# Patient Record
Sex: FEMALE | Race: BLACK OR AFRICAN AMERICAN | NOT HISPANIC OR LATINO | Employment: FULL TIME | URBAN - METROPOLITAN AREA
[De-identification: names, ages, dates, MRNs, and addresses within clinical notes are randomized per-mention and may not be internally consistent; named-entity substitution may affect disease eponyms.]

---

## 2017-01-04 ENCOUNTER — TELEPHONE (OUTPATIENT)
Dept: FAMILY MEDICINE | Facility: CLINIC | Age: 47
End: 2017-01-04

## 2017-01-04 NOTE — TELEPHONE ENCOUNTER
The physical she had on 11/21 was not dated.  The date is missing next to providers signature.  Please update in MyChart.

## 2017-02-16 ENCOUNTER — ALLIED HEALTH/NURSE VISIT (OUTPATIENT)
Dept: NURSING | Facility: CLINIC | Age: 47
End: 2017-02-16
Payer: COMMERCIAL

## 2017-02-16 DIAGNOSIS — Z23 NEED FOR HEPATITIS B VACCINATION: Primary | ICD-10-CM

## 2017-02-16 PROCEDURE — 90746 HEPB VACCINE 3 DOSE ADULT IM: CPT

## 2017-02-16 PROCEDURE — 99207 ZZC NO CHARGE NURSE ONLY: CPT

## 2017-02-16 PROCEDURE — 90471 IMMUNIZATION ADMIN: CPT

## 2017-02-16 NOTE — MR AVS SNAPSHOT
After Visit Summary   2/16/2017    Arcelia Liz    MRN: [id number]           Patient Information     Date Of Birth          1970        Visit Information        Provider Department      2/16/2017 1:30 PM [facility]        Today's Diagnoses     Need for hepatitis B vaccination    -  1       Follow-ups after your visit        Who to contact     If you have questions or need follow up information about today's clinic visit or your schedule please contact [facility] directly at [phone].  Normal or non-critical lab and imaging results will be communicated to you by Vidithart, letter or phone within 4 business days after the clinic has received the results. If you do not hear from us within 7 days, please contact the clinic through iGot or phone. If you have a critical or abnormal lab result, we will notify you by phone as soon as possible.  Submit refill requests through [employer] or call your pharmacy and they will forward the refill request to us. Please allow 3 business days for your refill to be completed.          Additional Information About Your Visit        MyChart Information     [employer] gives you secure access to your electronic health record. If you see a primary care provider, you can also send messages to your care team and make appointments. If you have questions, please call your primary care clinic.  If you do not have a primary care provider, please call [phone] and they will assist you.        Care EveryWhere ID     This is your Care EveryWhere ID. This could be used by other organizations to access your [facility] medical records  [id number]         Blood Pressure from Last 3 Encounters:   11/21/16 110/74   07/14/16 107/71   10/30/15 115/71    Weight from Last 3 Encounters:   11/21/16 168 lb 12.8 oz (76.6 kg)   07/14/16 176 lb (79.8 kg)   10/30/15 178 lb (80.7 kg)              We Performed the Following     HEPATITIS B VACCINE,ADULT,IM      VACCINE ADMINISTRATION, INITIAL        Primary Care Provider Office Phone # Fax #    Maria Baldwin PA-C [phone]       [facility] [address]  [place] [address]        Thank you!     Thank you for choosing [facility]  for your care. Our goal is always to provide you with excellent care. Hearing back from our patients is one way we can continue to improve our services. Please take a few minutes to complete the written survey that you may receive in the mail after your visit with us. Thank you!             Your Updated Medication List - Protect others around you: Learn how to safely use, store and throw away your medicines at www.disposemymeds.org.          This list is accurate as of: 2/16/17  3:49 PM.  Always use your most recent med list.                   Brand Name Dispense Instructions for use    cholecalciferol 400 UNIT Tabs tablet    vitamin D    60 each    Take 1 tablet (400 Units) by mouth daily Take 2 tablets (800 Units) daily after completing 8 weeks of the Vitamin 50,000 Unit tablets.       ferrous sulfate 325 (65 FE) MG tablet    IRON    90 tablet    Take 1 tablet (325 mg) by mouth 2 times daily       naproxen 500 MG tablet    NAPROSYN    30 tablet    Take 1 tablet (500 mg) by mouth 2 times daily as needed for moderate pain       order for DME     1 each    Equipment being ordered: Right Knee brace

## 2017-02-16 NOTE — NURSING NOTE
Screening Questionnaire for Adult Immunization    Are you sick today?   No   Do you have allergies to medications, food, a vaccine component or latex?   No   Have you ever had a serious reaction after receiving a vaccination?   No   Do you have a long-term health problem with heart disease, lung disease, asthma, kidney disease, metabolic disease (e.g. diabetes), anemia, or other blood disorder?   No   Do you have cancer, leukemia, HIV/AIDS, or any other immune system problem?   No   In the past 3 months, have you taken medications that affect  your immune system, such as prednisone, other steroids, or anticancer drugs; drugs for the treatment of rheumatoid arthritis, Crohn s disease, or psoriasis; or have you had radiation treatments?   No   Have you had a seizure, or a brain or other nervous system problem?   No   During the past year, have you received a transfusion of blood or blood     products, or been given immune (gamma) globulin or antiviral drug?   No   For women: Are you pregnant or is there a chance you could become        pregnant during the next month?   No   Have you received any vaccinations in the past 4 weeks?   No     Immunization questionnaire answers were all negative.      MNVFC doesn't apply on this patient    Per orders of Maria Baldwin, injection of Hep B given by Francheska Landeros. Patient instructed to remain in clinic for 20 minutes afterwards, and to report any adverse reaction to me immediately.       Screening performed by Francheska Landeros on 2/16/2017 at 3:48 PM.

## 2017-03-23 ENCOUNTER — TELEPHONE (OUTPATIENT)
Dept: FAMILY MEDICINE | Facility: CLINIC | Age: 47
End: 2017-03-23

## 2017-03-23 NOTE — TELEPHONE ENCOUNTER
Patient states that her physical form needs to be both signed and dated by Lily Galindo. She is requesting this be done and she be contacted when completed.

## 2017-04-03 ENCOUNTER — RADIANT APPOINTMENT (OUTPATIENT)
Dept: ULTRASOUND IMAGING | Facility: CLINIC | Age: 47
End: 2017-04-03
Attending: NURSE PRACTITIONER
Payer: MEDICAID

## 2017-04-03 ENCOUNTER — OFFICE VISIT (OUTPATIENT)
Dept: FAMILY MEDICINE | Facility: CLINIC | Age: 47
End: 2017-04-03
Payer: MEDICAID

## 2017-04-03 DIAGNOSIS — R10.11 RUQ ABDOMINAL PAIN: ICD-10-CM

## 2017-04-03 DIAGNOSIS — K21.9 GASTROESOPHAGEAL REFLUX DISEASE WITHOUT ESOPHAGITIS: ICD-10-CM

## 2017-04-03 DIAGNOSIS — R10.84 ABDOMINAL PAIN, GENERALIZED: Primary | ICD-10-CM

## 2017-04-03 DIAGNOSIS — R10.84 ABDOMINAL PAIN, GENERALIZED: ICD-10-CM

## 2017-04-03 DIAGNOSIS — K59.00 CONSTIPATION, UNSPECIFIED CONSTIPATION TYPE: ICD-10-CM

## 2017-04-03 LAB
ALBUMIN SERPL-MCNC: 4 G/DL (ref 3.4–5)
ALP SERPL-CCNC: 77 U/L (ref 40–150)
ALT SERPL W P-5'-P-CCNC: 18 U/L (ref 0–50)
AST SERPL W P-5'-P-CCNC: 12 U/L (ref 0–45)
BASOPHILS # BLD AUTO: 0 10E9/L (ref 0–0.2)
BASOPHILS NFR BLD AUTO: 0.8 %
BILIRUB DIRECT SERPL-MCNC: <0.1 MG/DL (ref 0–0.2)
BILIRUB SERPL-MCNC: 0.2 MG/DL (ref 0.2–1.3)
DIFFERENTIAL METHOD BLD: ABNORMAL
EOSINOPHIL # BLD AUTO: 0 10E9/L (ref 0–0.7)
EOSINOPHIL NFR BLD AUTO: 0.5 %
ERYTHROCYTE [DISTWIDTH] IN BLOOD BY AUTOMATED COUNT: 14.9 % (ref 10–15)
HCT VFR BLD AUTO: 37.6 % (ref 35–47)
HGB BLD-MCNC: 12 G/DL (ref 11.7–15.7)
LYMPHOCYTES # BLD AUTO: 1.3 10E9/L (ref 0.8–5.3)
LYMPHOCYTES NFR BLD AUTO: 33.7 %
MCH RBC QN AUTO: 26.1 PG (ref 26.5–33)
MCHC RBC AUTO-ENTMCNC: 31.9 G/DL (ref 31.5–36.5)
MCV RBC AUTO: 82 FL (ref 78–100)
MONOCYTES # BLD AUTO: 0.3 10E9/L (ref 0–1.3)
MONOCYTES NFR BLD AUTO: 7.6 %
NEUTROPHILS # BLD AUTO: 2.2 10E9/L (ref 1.6–8.3)
NEUTROPHILS NFR BLD AUTO: 57.4 %
PLATELET # BLD AUTO: 297 10E9/L (ref 150–450)
PROT SERPL-MCNC: 7.4 G/DL (ref 6.8–8.8)
RBC # BLD AUTO: 4.6 10E12/L (ref 3.8–5.2)
WBC # BLD AUTO: 3.8 10E9/L (ref 4–11)

## 2017-04-03 PROCEDURE — 99214 OFFICE O/P EST MOD 30 MIN: CPT | Performed by: NURSE PRACTITIONER

## 2017-04-03 PROCEDURE — 36415 COLL VENOUS BLD VENIPUNCTURE: CPT | Performed by: NURSE PRACTITIONER

## 2017-04-03 PROCEDURE — 76700 US EXAM ABDOM COMPLETE: CPT

## 2017-04-03 PROCEDURE — 80076 HEPATIC FUNCTION PANEL: CPT | Performed by: NURSE PRACTITIONER

## 2017-04-03 PROCEDURE — 85025 COMPLETE CBC W/AUTO DIFF WBC: CPT | Performed by: NURSE PRACTITIONER

## 2017-04-03 RX ORDER — POLYETHYLENE GLYCOL 3350 17 G/17G
1 POWDER, FOR SOLUTION ORAL DAILY
Qty: 510 G | Refills: 1 | Status: SHIPPED | OUTPATIENT
Start: 2017-04-03 | End: 2017-10-06

## 2017-04-03 NOTE — PATIENT INSTRUCTIONS
If you are having constipation, I want you to use a capful of mirilax daily- mix with any liquid. Increase your fluids to 8-12 glasses of water per day.  If you have reflux symptom, take omeprazole daily. You can take this daily to prevent acid reflux/ heart burn. Go to ultrasound. Follow up if your symptoms persist or worsen.  We will let you know your lab results. I hope your husbands tests turn out well. Let me know if you have any questions or concerns.   Abdominal Pain  Abdominal pain is pain in the stomach or intestinal area. Everyone has this pain from time to time. In many cases it goes away on its own. But abdominal pain can sometimes be due to a serious problem, such as appendicitis. So it s important to know when to seek help.  Causes of abdominal pain  There are many possible causes of abdominal pain. Common causes in adults include:    Constipation, diarrhea, or gas    GERD (gastroesophageal reflux disease) movement of stomach acid into the esophagus, also known as acid reflux or heartburn    Peptic ulcer (a sore in the lining of the stomach or small intestine)    Inflammation of the gallbladder, liver, or pancreas    Gallstones or kidney stones    Appendicitis     Obstruction of the intestines     Hernia (bulging of an internal organ through a muscle or other tissue)    Urinary tract infections    In women, menstrual cramps, fibroids, or endometriosis of the uterus    Inflammation or infection of the intestines  Diagnosing the cause of abdominal pain  Your health care provider will examine you to help find the cause of your pain. If needed, tests will be ordered. Because abdominal pain has so many possible causes, it can be hard to discover the reason for the pain. Giving details about your pain can help. Be ready to tell your health care provider where and when you feel the pain and what makes it better or worse. Also mention whether you have other symptoms such as fever, tiredness, nausea, vomiting,  or changes in bathroom habits.  Treating abdominal pain  Certain causes of pain, such as appendicitis or a bowel obstruction, need emergency treatment. Other problems can be treated with rest, fluids, or medications. Your health care provider can give you specific instructions for treatment or self-care based on the cause of your pain.  If you have vomiting or diarrhea, sip water or other clear fluids. When you are ready to eat solid foods again, start with small amounts of easy-to-digest, low-fat foods, such as applesauce, toast, or crackers.   When to call the doctor  Call 911 or go to the hospital right away if you:    Can t pass stool and are vomiting    Are vomiting blood or have black, tarry diarrhea    Also have chest, neck, or shoulder pain    Feel like you are about to pass out    Have pain in your shoulder blades with nausea    Have sudden, excruciating abdominal pain    Have new, severe pain unlike any you have felt before    Have a belly that is rigid, hard, and tender to touch  Call your doctor if you have:    Pain for more than 5 days    Bloating for more than 2 days    Diarrhea for more than 5 days    Fever of 101 F (38.3 C) or higher    Pain that continues to worsen    Unexplained weight loss    Continued lack of appetite    Blood in the stool  How to prevent abdominal pain  Here are some tips to help prevent abdominal pain:    Eat smaller amounts of food at one time.    Avoid greasy, fried, or other high-fat foods.    Avoid foods that give you gas.    Exercise regularly.    Drink plenty of fluids.  To help prevent symptoms of gastroesophageal reflux disease (GERD):    Quit smoking.    Reduce alcohol and certain foods that increase stomach acid.     Lose excess weight.    Finish eating at least 2 hours before you go to bed or lie down.    Elevate the head of your bed.    1579-0015 [employer]. [address] [place] [address], [place] [address]. All rights reserved. This information is not  intended as a substitute for professional medical care. Always follow your healthcare professional's instructions.        Tips to Control Acid Reflux  To control acid reflux, you ll need to make some basic diet and lifestyle changes. The simple steps outlined below may be all you ll need to relieve discomfort.  Watch What You Eat      Avoid fatty foods and spicy foods.    Eat fewer acidic foods, such as citrus and tomato-based foods. These can increase symptoms.    Limit drinking alcohol, caffeine, and fizzy beverages. All increase acid reflux.    Try limiting chocolate, peppermint, and spearmint. These can worsen acid reflux in some people.  Watch When You Eat    Avoid lying down for 3 hours after eating.    Do not snack before going to bed.  Raise Your Head    Raising your head and upper body by 4 inches to 6 inches helps limit reflux when you re lying down. Put blocks under the head of the bed frame to raise it.  Other Changes    Lose weight, if you need to.    Don t work out near bedtime.    Avoid tight-fitting clothes.    Limit aspirin and ibuprofen.    Stop smoking.     8445-2124 [employer]. [address] [place] [address]. All rights reserved. This information is not intended as a substitute for professional medical care. Always follow your healthcare professional's instructions.        Lifestyle Changes for Controlling GERD  When you have GERD, stomach acid feels as if it s backing up toward your mouth. Whether or not you take medication to control your GERD, your symptoms can often be improved with lifestyle changes. Talk to your doctor about the following suggestions, which may help you get relief from your symptoms.  Raise Your Head    Reflux is more likely to strike when you re lying down flat, because stomach fluid can flow backward more easily. Raising the head of your bed 4 to 6 inches can help. To do this:    Slide blocks or books under the legs at the head of your bed. Or,  place a wedge under the mattress. Many foam stores can make a suitable wedge for you. The wedge should run from your waist to the top of your head.    Don t just prop your head on several pillows. This increases pressure on your stomach. It can make GERD worse.  Watch Your Eating Habits  Certain foods may increase the acid in your stomach or relax the lower esophageal sphincter, making GERD more likely. It s best to avoid the following:    Coffee, tea, and carbonated drinks (with and without caffeine)    Fatty, fried, or spicy food    Mint, chocolate, onions, and tomatoes    Any other foods that seem to irritate your stomach or cause you pain  Relieve the Pressure    Eat smaller meals, even if you have to eat more often.    Don t lie down right after you eat. Wait a few hours for your stomach to empty.    Avoid tight belts and tight-fitting clothes.    Lose excess weight.  Tobacco and Alcohol  Avoid smoking tobacco and drinking alcohol. They can make GERD symptoms worse.    8642-0651 [employer]. [address] [place] [address], [place] [address]. All rights reserved. This information is not intended as a substitute for professional medical care. Always follow your healthcare professional's instructions.

## 2017-04-03 NOTE — MR AVS SNAPSHOT
After Visit Summary   4/3/2017    Arcelia Liz    MRN: [id number]           Patient Information     Date Of Birth          1970        Visit Information        Provider Department      4/3/2017 10:00 AM Lily Galindo NP [facility]        Today's Diagnoses     Abdominal pain, generalized    -  1    RUQ abdominal pain        Constipation, unspecified constipation type        Gastroesophageal reflux disease without esophagitis          Care Instructions    If you are having constipation, I want you to use a capful of mirilax daily- mix with any liquid. Increase your fluids to 8-12 glasses of water per day.  If you have reflux symptom, take omeprazole daily. You can take this daily to prevent acid reflux/ heart burn. Go to ultrasound. Follow up if your symptoms persist or worsen.  We will let you know your lab results. I hope your husbands tests turn out well. Let me know if you have any questions or concerns.   Abdominal Pain  Abdominal pain is pain in the stomach or intestinal area. Everyone has this pain from time to time. In many cases it goes away on its own. But abdominal pain can sometimes be due to a serious problem, such as appendicitis. So it s important to know when to seek help.  Causes of abdominal pain  There are many possible causes of abdominal pain. Common causes in adults include:    Constipation, diarrhea, or gas    GERD (gastroesophageal reflux disease) movement of stomach acid into the esophagus, also known as acid reflux or heartburn    Peptic ulcer (a sore in the lining of the stomach or small intestine)    Inflammation of the gallbladder, liver, or pancreas    Gallstones or kidney stones    Appendicitis     Obstruction of the intestines     Hernia (bulging of an internal organ through a muscle or other tissue)    Urinary tract infections    In women, menstrual cramps, fibroids, or endometriosis of the uterus    Inflammation or infection of the  intestines  Diagnosing the cause of abdominal pain  Your health care provider will examine you to help find the cause of your pain. If needed, tests will be ordered. Because abdominal pain has so many possible causes, it can be hard to discover the reason for the pain. Giving details about your pain can help. Be ready to tell your health care provider where and when you feel the pain and what makes it better or worse. Also mention whether you have other symptoms such as fever, tiredness, nausea, vomiting, or changes in bathroom habits.  Treating abdominal pain  Certain causes of pain, such as appendicitis or a bowel obstruction, need emergency treatment. Other problems can be treated with rest, fluids, or medications. Your health care provider can give you specific instructions for treatment or self-care based on the cause of your pain.  If you have vomiting or diarrhea, sip water or other clear fluids. When you are ready to eat solid foods again, start with small amounts of easy-to-digest, low-fat foods, such as applesauce, toast, or crackers.   When to call the doctor  Call 911 or go to the hospital right away if you:    Can t pass stool and are vomiting    Are vomiting blood or have black, tarry diarrhea    Also have chest, neck, or shoulder pain    Feel like you are about to pass out    Have pain in your shoulder blades with nausea    Have sudden, excruciating abdominal pain    Have new, severe pain unlike any you have felt before    Have a belly that is rigid, hard, and tender to touch  Call your doctor if you have:    Pain for more than 5 days    Bloating for more than 2 days    Diarrhea for more than 5 days    Fever of 101 F (38.3 C) or higher    Pain that continues to worsen    Unexplained weight loss    Continued lack of appetite    Blood in the stool  How to prevent abdominal pain  Here are some tips to help prevent abdominal pain:    Eat smaller amounts of food at one time.    Avoid greasy, fried, or  other high-fat foods.    Avoid foods that give you gas.    Exercise regularly.    Drink plenty of fluids.  To help prevent symptoms of gastroesophageal reflux disease (GERD):    Quit smoking.    Reduce alcohol and certain foods that increase stomach acid.     Lose excess weight.    Finish eating at least 2 hours before you go to bed or lie down.    Elevate the head of your bed.    6971-9542 [employer]. [address] [place] [address]. All rights reserved. This information is not intended as a substitute for professional medical care. Always follow your healthcare professional's instructions.        Tips to Control Acid Reflux  To control acid reflux, you ll need to make some basic diet and lifestyle changes. The simple steps outlined below may be all you ll need to relieve discomfort.  Watch What You Eat      Avoid fatty foods and spicy foods.    Eat fewer acidic foods, such as citrus and tomato-based foods. These can increase symptoms.    Limit drinking alcohol, caffeine, and fizzy beverages. All increase acid reflux.    Try limiting chocolate, peppermint, and spearmint. These can worsen acid reflux in some people.  Watch When You Eat    Avoid lying down for 3 hours after eating.    Do not snack before going to bed.  Raise Your Head    Raising your head and upper body by 4 inches to 6 inches helps limit reflux when you re lying down. Put blocks under the head of the bed frame to raise it.  Other Changes    Lose weight, if you need to.    Don t work out near bedtime.    Avoid tight-fitting clothes.    Limit aspirin and ibuprofen.    Stop smoking.     5263-0971 [employer]. [address] [place] [address]32. All rights reserved. This information is not intended as a substitute for professional medical care. Always follow your healthcare professional's instructions.        Lifestyle Changes for Controlling GERD  When you have GERD, stomach acid feels as if it s backing up  toward your mouth. Whether or not you take medication to control your GERD, your symptoms can often be improved with lifestyle changes. Talk to your doctor about the following suggestions, which may help you get relief from your symptoms.  Raise Your Head    Reflux is more likely to strike when you re lying down flat, because stomach fluid can flow backward more easily. Raising the head of your bed 4 to 6 inches can help. To do this:    Slide blocks or books under the legs at the head of your bed. Or, place a wedge under the mattress. Many [employer] can make a suitable wedge for you. The wedge should run from your waist to the top of your head.    Don t just prop your head on several pillows. This increases pressure on your stomach. It can make GERD worse.  Watch Your Eating Habits  Certain foods may increase the acid in your stomach or relax the lower esophageal sphincter, making GERD more likely. It s best to avoid the following:    Coffee, tea, and carbonated drinks (with and without caffeine)    Fatty, fried, or spicy food    Mint, chocolate, onions, and tomatoes    Any other foods that seem to irritate your stomach or cause you pain  Relieve the Pressure    Eat smaller meals, even if you have to eat more often.    Don t lie down right after you eat. Wait a few hours for your stomach to empty.    Avoid tight belts and tight-fitting clothes.    Lose excess weight.  Tobacco and Alcohol  Avoid smoking tobacco and drinking alcohol. They can make GERD symptoms worse.    1270-2316 [employer]. [address] [place] [address], [place] [address]. All rights reserved. This information is not intended as a substitute for professional medical care. Always follow your healthcare professional's instructions.              Follow-ups after your visit        Follow-up notes from your care team     Return if symptoms worsen or fail to improve.      Your next 10 appointments already scheduled     Apr 03, 2017  4:30 PM CDT    US ABDOMEN COMPLETE with BEUS1   [facility] ([facility])    [address] [facility] [id number] [phone]           Please bring a list of your medicines (including vitamins, minerals and over-the-counter drugs). Also, tell your doctor about any allergies you may have. Wear comfortable clothes and leave your valuables at home.  Adults: No eating or drinking for 8 hours before the exam. You may take medicine with a small sip of water.  Children: - Children 6+ years: No food or drink for 6 hours before exam. - Children 1-5 years: No food or drink for 4 hours before exam. - Infants, breast-fed: may have breast milk up to 2 hours before exam. - Infants, formula: may have bottle until 4 hours before exam.  Please call the Imaging Department at your exam site with any questions.            Apr 10, 2017 10:00 AM CDT   SHORT with Lily Galindo NP   [facility] ([facility])    [address] [facility] [address]   [phone]              Future tests that were ordered for you today     Open Future Orders        Priority Expected Expires Ordered    US Abdomen Complete Routine 7/2/2017 4/3/2018 4/3/2017            Who to contact     Normal or non-critical lab and imaging results will be communicated to you by [employer], letter or phone within 4 business days after the clinic has received the results. If you do not hear from us within 7 days, please contact the clinic through [employer] or phone. If you have a critical or abnormal lab result, we will notify you by phone as soon as possible.  Submit refill requests through [employer] or call your pharmacy and they will forward the refill request to us. Please allow 3 business days for your refill to be completed.          If you need to speak with a  for additional information , please call: [phone]             Additional Information About Your Visit        [employer] Information      [employer] gives you secure access to your electronic health record. If you see a primary care provider, you can also send messages to your care team and make appointments. If you have questions, please call your primary care clinic.  If you do not have a primary care provider, please call [phone] and they will assist you.        Care EveryWhere ID     This is your Care EveryWhere ID. This could be used by other organizations to access your [facility] medical records  [id number]        Your Vitals Were     Pulse Temperature Last Period Pulse Oximetry Breastfeeding? BMI (Body Mass Index)    117 98.3  F (36.8  C) (Oral) 03/13/2017 98% No 28.92 kg/m2       Blood Pressure from Last 3 Encounters:   04/03/17 (!) 147/94   11/21/16 110/74   07/14/16 107/71    Weight from Last 3 Encounters:   04/03/17 169 lb 6.4 oz (76.8 kg)   11/21/16 168 lb 12.8 oz (76.6 kg)   07/14/16 176 lb (79.8 kg)              We Performed the Following     CBC with platelets and differential     Hepatic panel (Albumin, ALT, AST, Bili, Alk Phos, TP)          Today's Medication Changes          These changes are accurate as of: 4/3/17 10:19 AM.  If you have any questions, ask your nurse or doctor.               Start taking these medicines.        Dose/Directions    omeprazole 20 MG CR capsule   Commonly known as:  priLOSEC   Used for:  Gastroesophageal reflux disease without esophagitis   Started by:  Lily Galindo NP        Dose:  20 mg   Take 1 capsule (20 mg) by mouth daily   Quantity:  90 capsule   Refills:  1       polyethylene glycol powder   Commonly known as:  MIRALAX   Used for:  Constipation, unspecified constipation type   Started by:  Lily Galindo NP        Dose:  1 capful   Take 17 g (1 capful) by mouth daily   Quantity:  510 g   Refills:  1            Where to get your medicines      These medications were sent to [facility] - [address] [facility]  [address] [address]      Phone:  [phone]     omeprazole 20 MG CR capsule    polyethylene glycol powder                Primary Care Provider Office Phone # Fax #    Maria Baldwin PA-C [phone]       [facility] [address] [facility] [address]        Thank you!     Thank you for choosing [facility]  for your care. Our goal is always to provide you with excellent care. Hearing back from our patients is one way we can continue to improve our services. Please take a few minutes to complete the written survey that you may receive in the mail after your visit with us. Thank you!             Your Updated Medication List - Protect others around you: Learn how to safely use, store and throw away your medicines at www.disposemymeds.org.          This list is accurate as of: 4/3/17 10:19 AM.  Always use your most recent med list.                   Brand Name Dispense Instructions for use    omeprazole 20 MG CR capsule    priLOSEC    90 capsule    Take 1 capsule (20 mg) by mouth daily       polyethylene glycol powder    MIRALAX    510 g    Take 17 g (1 capful) by mouth daily

## 2017-04-03 NOTE — PROGRESS NOTES
SUBJECTIVE:                                                    Arcelia Liz is a 47 year old female who presents to clinic today for the following health issues:      ABDOMINAL PAIN     Onset: 3 weeks    Description:   Character: Dull ache  Location: epigastric region  Radiation: Back    Intensity: mild    Progression of Symptoms:  same    Accompanying Signs & Symptoms:  Fever/Chills?: no   Gas/Bloating: YES- gas  Nausea: no   Vomitting: no   Diarrhea?: no   Constipation:YES  Dysuria or Hematuria: no    History:   Trauma: no   Previous similar pain: YES- 4 years ago   Previous tests done: lab work, xrays, colonoscopy, ct scan-nothing found at the time    Precipitating factors:   Does the pain change with:     Food: YES     BM: no     Urination: no     Alleviating factors:  none    Therapies Tried and outcome: none    LMP:  3/13/17   Hx of GERD, on omeprazole      Problem list and histories reviewed & adjusted, as indicated.  Additional history: as documented    Patient Active Problem List   Diagnosis     CARDIOVASCULAR SCREENING; LDL GOAL LESS THAN 160     Epigastric pain     Mantoux: positive     Inflammatory acne     Comedonal acne     Anemia     Past Surgical History:   Procedure Laterality Date     COLONOSCOPY  9/6/2012    Procedure: COLONOSCOPY;  COLONOSCOPY, CHRONIC LOWER ABD PAIN;  Surgeon: Jordan Mitchell MD;  Location: [facility] OR     DILATION AND CURETTAGE  2005    MAB, twin gestation       Social History   Substance Use Topics     Smoking status: Never Smoker     Smokeless tobacco: Never Used      Comment: Lives in smoke free household     Alcohol use No     Family History   Problem Relation Age of Onset     Asthma Father      DIABETES Father      Glaucoma No family hx of      Macular Degeneration No family hx of      Thyroid Disease No family hx of      CEREBROVASCULAR DISEASE No family hx of      CANCER No family hx of      Hypertension No family hx of      C.A.D. No family hx of      Lupus No  family hx of      Thrombophilia No family hx of      Psoriasis No family hx of      Eczema No family hx of      Melanoma No family hx of          Current Outpatient Prescriptions   Medication Sig Dispense Refill     polyethylene glycol (MIRALAX) powder Take 17 g (1 capful) by mouth daily 510 g 1     omeprazole (PRILOSEC) 20 MG CR capsule Take 1 capsule (20 mg) by mouth daily 90 capsule 1     Allergies   Allergen Reactions     Nkda [No Known Drug Allergies]      BP Readings from Last 3 Encounters:   04/10/17 144/86   04/03/17 124/85   11/21/16 110/74    Wt Readings from Last 3 Encounters:   04/10/17 167 lb 6.4 oz (75.9 kg)   04/03/17 169 lb 6.4 oz (76.8 kg)   11/21/16 168 lb 12.8 oz (76.6 kg)           Labs reviewed in EPIC    Reviewed and updated as needed this visit by clinical staff  Tobacco  Allergies  Meds  Med Hx  Surg Hx  Fam Hx  Soc Hx      Reviewed and updated as needed this visit by Provider         ROS:  Constitutional, HEENT, cardiovascular, pulmonary, GI, , musculoskeletal, neuro, skin, endocrine and psych systems are negative, except as otherwise noted.    OBJECTIVE:                                                    /85  Pulse 117  Temp 98.3  F (36.8  C) (Oral)  Wt 169 lb 6.4 oz (76.8 kg)  LMP 03/13/2017  SpO2 98%  Breastfeeding? No  BMI 28.92 kg/m2  Body mass index is 28.92 kg/(m^2).  GENERAL: healthy, alert and no distress  NECK: no adenopathy, no asymmetry, masses, or scars and thyroid normal to palpation  RESP: lungs clear to auscultation - no rales, rhonchi or wheezes  CV: regular rate and rhythm, normal S1 S2, no S3 or S4, no murmur, click or rub, no peripheral edema and peripheral pulses strong  ABDOMEN: soft, no hepatosplenomegaly, no masses and bowel sounds normal POSITIVE for RUQ pain with palpation/ epigastric pain with palpation; no rebound tenderness.   NEURO: Normal strength and tone, mentation intact and speech normal  PSYCH: A&O, affect normal/bright    Diagnostic  Test Results:  See orders     ASSESSMENT/PLAN:                                                            ICD-10-CM    1. Abdominal pain, generalized R10.84 US Abdomen Complete     CBC with platelets and differential     Hepatic panel (Albumin, ALT, AST, Bili, Alk Phos, TP)   2. RUQ abdominal pain R10.11 US Abdomen Complete     CBC with platelets and differential     Hepatic panel (Albumin, ALT, AST, Bili, Alk Phos, TP)   3. Constipation, unspecified constipation type K59.00 polyethylene glycol (MIRALAX) powder   4. Gastroesophageal reflux disease without esophagitis K21.9 omeprazole (PRILOSEC) 20 MG CR capsule       See Patient Instructions: If you are having constipation, I want you to use a capful of mirilax daily- mix with any liquid. Increase your fluids to 8-12 glasses of water per day.  If you have reflux symptom, take omeprazole daily. You can take this daily to prevent acid reflux/ heart burn. Go to ultrasound. Follow up if your symptoms persist or worsen.  We will let you know your lab results. I hope your husbands tests turn out well. Let me know if you have any questions or concerns.     Lily Galindo, ANIYA  [facility]

## 2017-04-03 NOTE — NURSING NOTE
"Chief Complaint   Patient presents with     Abdominal Pain       Initial BP (!) 147/94  Pulse 117  Temp 98.3  F (36.8  C) (Oral)  Wt 169 lb 6.4 oz (76.8 kg)  SpO2 98%  BMI 28.92 kg/m2 Estimated body mass index is 28.92 kg/(m^2) as calculated from the following:    Height as of 11/21/16: 5' 4.17\" (1.63 m).    Weight as of this encounter: 169 lb 6.4 oz (76.8 kg).  Medication Reconciliation: complete     Monika Winters MA  "

## 2017-04-04 DIAGNOSIS — N28.89 RENAL MASS, LEFT: Primary | ICD-10-CM

## 2017-04-04 NOTE — PROGRESS NOTES
Please call with results. Cyst or mass seen in left kidney. Radiologist recommending MRI for further evaluation. Order placed. Please help pt get scheduled.     Lily Galindo NP

## 2017-04-04 NOTE — PROGRESS NOTES
Terrell Paniagua,    Thank you for your recent office visit.    Here are your recent results.  Your white blood cell count is slightly low.  We should recheck it in one week to see where it is at. Call [phone] to schedule a lab recheck next week. Other labs are normal.    Feel free to contact me via [employer] or call the clinic at [phone].    Sincerely,    Lily Galindo, NAKUL, FNP-BC

## 2017-04-05 ENCOUNTER — RADIANT APPOINTMENT (OUTPATIENT)
Dept: MRI IMAGING | Facility: CLINIC | Age: 47
End: 2017-04-05
Attending: NURSE PRACTITIONER
Payer: MEDICAID

## 2017-04-05 DIAGNOSIS — N28.89 RENAL MASS, LEFT: ICD-10-CM

## 2017-04-05 PROCEDURE — 74183 MRI ABD W/O CNTR FLWD CNTR: CPT | Mod: TC

## 2017-04-05 PROCEDURE — A9585 GADOBUTROL INJECTION: HCPCS | Performed by: FAMILY MEDICINE

## 2017-04-05 RX ORDER — GADOBUTROL 604.72 MG/ML
10 INJECTION INTRAVENOUS ONCE
Status: COMPLETED | OUTPATIENT
Start: 2017-04-05 | End: 2017-04-05

## 2017-04-05 RX ADMIN — GADOBUTROL 10 ML: 604.72 INJECTION INTRAVENOUS at 17:32

## 2017-04-10 ENCOUNTER — OFFICE VISIT (OUTPATIENT)
Dept: FAMILY MEDICINE | Facility: CLINIC | Age: 47
End: 2017-04-10
Payer: MEDICAID

## 2017-04-10 ENCOUNTER — TELEPHONE (OUTPATIENT)
Dept: FAMILY MEDICINE | Facility: CLINIC | Age: 47
End: 2017-04-10

## 2017-04-10 VITALS
DIASTOLIC BLOOD PRESSURE: 86 MMHG | OXYGEN SATURATION: 100 % | BODY MASS INDEX: 28.58 KG/M2 | WEIGHT: 167.4 LBS | SYSTOLIC BLOOD PRESSURE: 144 MMHG | HEART RATE: 126 BPM | TEMPERATURE: 98.1 F

## 2017-04-10 DIAGNOSIS — Z11.51 SCREENING FOR HUMAN PAPILLOMAVIRUS: ICD-10-CM

## 2017-04-10 DIAGNOSIS — Z12.4 SCREENING FOR CERVICAL CANCER: ICD-10-CM

## 2017-04-10 DIAGNOSIS — Z71.9 COUNSELING, UNSPECIFIED: Primary | ICD-10-CM

## 2017-04-10 PROCEDURE — G0476 HPV COMBO ASSAY CA SCREEN: HCPCS | Performed by: NURSE PRACTITIONER

## 2017-04-10 PROCEDURE — G0145 SCR C/V CYTO,THINLAYER,RESCR: HCPCS | Performed by: NURSE PRACTITIONER

## 2017-04-10 PROCEDURE — 87624 HPV HI-RISK TYP POOLED RSLT: CPT | Performed by: NURSE PRACTITIONER

## 2017-04-10 PROCEDURE — 99214 OFFICE O/P EST MOD 30 MIN: CPT | Performed by: NURSE PRACTITIONER

## 2017-04-10 NOTE — MR AVS SNAPSHOT
After Visit Summary   4/10/2017    Arcelia Liz    MRN: [id number]           Patient Information     Date Of Birth          1970        Visit Information        Provider Department      4/10/2017 4:00 PM Lily Galindo NP [facility]        Today's Diagnoses     Screening for cervical cancer    -  1    Screening for human papillomavirus          Care Instructions    We will let you know your pap results. Follow up MRI to abdominal US was normal. Please let me know if you have any health care questions or concerns.         Follow-ups after your visit        Follow-up notes from your care team     Return if symptoms worsen or fail to improve.      Who to contact     Normal or non-critical lab and imaging results will be communicated to you by [employer], letter or phone within 4 business days after the clinic has received the results. If you do not hear from us within 7 days, please contact the clinic through [employer] or phone. If you have a critical or abnormal lab result, we will notify you by phone as soon as possible.  Submit refill requests through [employer] or call your pharmacy and they will forward the refill request to us. Please allow 3 business days for your refill to be completed.          If you need to speak with a  for additional information , please call: [phone]             Additional Information About Your Visit        [employer] Information     [employer] gives you secure access to your electronic health record. If you see a primary care provider, you can also send messages to your care team and make appointments. If you have questions, please call your primary care clinic.  If you do not have a primary care provider, please call [phone] and they will assist you.        Care EveryWhere ID     This is your Care EveryWhere ID. This could be used by other organizations to access your [facility] medical records  [id number]        Your Vitals Were     Pulse  Temperature Last Period Pulse Oximetry BMI (Body Mass Index)       126 98.1  F (36.7  C) (Oral) 03/13/2017 100% 28.58 kg/m2        Blood Pressure from Last 3 Encounters:   04/10/17 144/86   04/03/17 124/85   11/21/16 110/74    Weight from Last 3 Encounters:   04/10/17 167 lb 6.4 oz (75.9 kg)   04/03/17 169 lb 6.4 oz (76.8 kg)   11/21/16 168 lb 12.8 oz (76.6 kg)              We Performed the Following     HPV High Risk Types DNA Cervical     Pap imaged thin layer screen with HPV - recommended age 30 - 65 years (select HPV order below)        Primary Care Provider Office Phone # Fax #    Maria Baldwin PA-C [phone]       [facility] [address]  [place] [address]        Thank you!     Thank you for choosing [facility]  for your care. Our goal is always to provide you with excellent care. Hearing back from our patients is one way we can continue to improve our services. Please take a few minutes to complete the written survey that you may receive in the mail after your visit with us. Thank you!             Your Updated Medication List - Protect others around you: Learn how to safely use, store and throw away your medicines at www.disposemymeds.org.          This list is accurate as of: 4/10/17  4:06 PM.  Always use your most recent med list.                   Brand Name Dispense Instructions for use    omeprazole 20 MG CR capsule    priLOSEC    90 capsule    Take 1 capsule (20 mg) by mouth daily       polyethylene glycol powder    MIRALAX    510 g    Take 17 g (1 capful) by mouth daily

## 2017-04-10 NOTE — LETTER
[facility] [place]  [address] [place] [address]  Phone: [phone]    April 10, 2017        Arcelia Liz  [address]  [place] [address]          To whom it may concern:    Please excuse our patient Arcelia Liz from school for approximately the next 3 months due to her 's illness as she will be caring for him.    Please contact me for questions or concerns.        Sincerely,        Lily Galindo CNP/luciano

## 2017-04-10 NOTE — NURSING NOTE
"Chief Complaint   Patient presents with     Gyn Exam       Initial BP (!) 151/99  Pulse 126  Temp 98.1  F (36.7  C) (Oral)  Wt 167 lb 6.4 oz (75.9 kg)  LMP 03/13/2017  SpO2 100%  BMI 28.58 kg/m2 Estimated body mass index is 28.58 kg/(m^2) as calculated from the following:    Height as of 11/21/16: 5' 4.17\" (1.63 m).    Weight as of this encounter: 167 lb 6.4 oz (75.9 kg).  Medication Reconciliation: complete     Monika Winters MA  "

## 2017-04-10 NOTE — PATIENT INSTRUCTIONS
We will let you know your pap results. Follow up MRI to abdominal US was normal. Please let me know if you have any health care questions or concerns.

## 2017-04-10 NOTE — PROGRESS NOTES
SUBJECTIVE:                                                    Arcelia Liz is a 47 year old female who presents to clinic today for the following health issues:      Patient is here for a pap only. Has had physical.    Patient is also here to discuss recent labs and imaging results.      Problem list and histories reviewed & adjusted, as indicated.  Additional history: as documented    Patient Active Problem List   Diagnosis     CARDIOVASCULAR SCREENING; LDL GOAL LESS THAN 160     Epigastric pain     Mantoux: positive     Inflammatory acne     Comedonal acne     Anemia     Past Surgical History:   Procedure Laterality Date     COLONOSCOPY  9/6/2012    Procedure: COLONOSCOPY;  COLONOSCOPY, CHRONIC LOWER ABD PAIN;  Surgeon: Jordan Mitchell MD;  Location: [facility]     DILATION AND CURETTAGE  2005    MAB, twin gestation       Social History   Substance Use Topics     Smoking status: Never Smoker     Smokeless tobacco: Never Used      Comment: Lives in smoke free household     Alcohol use No     Family History   Problem Relation Age of Onset     Asthma Father      DIABETES Father      Glaucoma No family hx of      Macular Degeneration No family hx of      Thyroid Disease No family hx of      CEREBROVASCULAR DISEASE No family hx of      CANCER No family hx of      Hypertension No family hx of      C.A.D. No family hx of      Lupus No family hx of      Thrombophilia No family hx of      Psoriasis No family hx of      Eczema No family hx of      Melanoma No family hx of          Current Outpatient Prescriptions   Medication Sig Dispense Refill     polyethylene glycol (MIRALAX) powder Take 17 g (1 capful) by mouth daily 510 g 1     omeprazole (PRILOSEC) 20 MG CR capsule Take 1 capsule (20 mg) by mouth daily 90 capsule 1     Allergies   Allergen Reactions     Nkda [No Known Drug Allergies]      BP Readings from Last 3 Encounters:   04/10/17 144/86   04/03/17 124/85   11/21/16 110/74    Wt Readings from Last 3  Encounters:   04/10/17 167 lb 6.4 oz (75.9 kg)   04/03/17 169 lb 6.4 oz (76.8 kg)   11/21/16 168 lb 12.8 oz (76.6 kg)            Labs reviewed in EPIC    Reviewed and updated as needed this visit by clinical staff  Tobacco  Allergies  Meds  Med Hx  Surg Hx  Fam Hx  Soc Hx      Reviewed and updated as needed this visit by Provider         ROS:  Constitutional, HEENT, cardiovascular, pulmonary, GI, , musculoskeletal, neuro, skin, endocrine and psych systems are negative, except as otherwise noted.    OBJECTIVE:                                                    /86  Pulse 126  Temp 98.1  F (36.7  C) (Oral)  Wt 167 lb 6.4 oz (75.9 kg)  LMP 03/13/2017  SpO2 100%  BMI 28.58 kg/m2  Body mass index is 28.58 kg/(m^2).  GENERAL: healthy, alert and no distress  RESP: lungs clear to auscultation - no rales, rhonchi or wheezes  CV: regular rate and rhythm, normal S1 S2, no S3 or S4, no murmur, click or rub, no peripheral edema and peripheral pulses strong  ABDOMEN: soft, nontender, no hepatosplenomegaly, no masses and bowel sounds normal  NEURO: A&O, mentation intact and speech normal   (female): normal female external genitalia, normal urethral meatus, vaginal mucosa pink, moist, well rugated, and normal cervix/adnexa/uterus without masses or discharge      Diagnostic Test Results:  PAP     ASSESSMENT/PLAN:                                                    Discussed that abdominal US showed possible renal mass, CT showed nothing abnormal. Pt reports that she still has some abdominal pain. She is taking omeprazole. She has a lot of stress with school and her husbands illness. Discussed possibility of ulcer, she can think about EGD.    BP Screening:   Last 3 BP Readings:    BP Readings from Last 3 Encounters:   04/10/17 144/86   04/03/17 124/85   11/21/16 110/74       The following was recommended to the patient:  Re-screen BP within a year and recommended lifestyle modifications      ICD-10-CM    1.  Counseling, unspecified Z71.9     regarding US and CT results   2. Screening for cervical cancer Z12.4 Pap imaged thin layer screen with HPV - recommended age 30 - 65 years (select HPV order below)   3. Screening for human papillomavirus Z11.51 HPV High Risk Types DNA Cervical       See Patient Instructions: We will let you know your pap results. Follow up MRI to abdominal US was normal. Please let me know if you have any health care questions or concerns.     Lily Galindo, VINAYAK  [facility]

## 2017-04-10 NOTE — TELEPHONE ENCOUNTER
Patient is asking for a letter to excuse her from school for the next 3 months due to her 's illness. Letter pended,please review. Will fax to [phone] when done.     Note      Spouse calling to say she needs a note for her school stating she has been caring for her  due to a medical condition, and is unable to attend school at this time. Please fax to [phone]. Attn:[employer] wants this by 12 pm today. Spouse would like to  original at  this afternoon. Please call as soon as possible. Ok to leave a message.    4/10/17 7:49 AM

## 2017-04-10 NOTE — LETTER
May 17, 2017    Arcelia Liz  [address] [address]  [place] [address]    Dear Arcelia,  We are happy to inform you that your PAP smear result from 4/10/17 is normal.  We are now able to do a follow up test on PAP smears. The DNA test is for HPV (Human Papilloma Virus). Cervical cancer is closely linked with certain types of HPV. Your result showed no evidence of high risk HPV.  Therefore we recommend you return in 3 years for your next pap smear and HPV test.  You will still need to return to the clinic every year for an annual exam and other preventive tests.  Please contact the clinic at [phone] with any questions.  Sincerely,    Lily Galindo NP/kan

## 2017-04-11 VITALS
SYSTOLIC BLOOD PRESSURE: 124 MMHG | OXYGEN SATURATION: 98 % | WEIGHT: 169.4 LBS | HEART RATE: 117 BPM | DIASTOLIC BLOOD PRESSURE: 85 MMHG | TEMPERATURE: 98.3 F | BODY MASS INDEX: 28.92 KG/M2

## 2017-04-12 LAB
COPATH REPORT: NORMAL
PAP: NORMAL

## 2017-04-14 LAB
FINAL DIAGNOSIS: NORMAL
HPV HR 12 DNA CVX QL NAA+PROBE: NEGATIVE
HPV16 DNA SPEC QL NAA+PROBE: NEGATIVE
HPV18 DNA SPEC QL NAA+PROBE: NEGATIVE
SPECIMEN DESCRIPTION: NORMAL

## 2017-05-08 ENCOUNTER — TELEPHONE (OUTPATIENT)
Dept: FAMILY MEDICINE | Facility: CLINIC | Age: 47
End: 2017-05-08

## 2017-05-08 NOTE — TELEPHONE ENCOUNTER
Ferrous Sulfate 325mg      Last Written Prescription Date: 11/14/16  Last Fill Quantity: 90,  # refills: 0   Last Office Visit with [facility], [facility] or [facility] prescribing provider: 04/10/17    Thank You,  Zuleima Vega, Pharmacy Tech  [place]/ [facility]

## 2017-05-09 NOTE — TELEPHONE ENCOUNTER
Hgb levels were good on 4/3/17, no further iron supplementation needed currently. Lily Galindo, APRN, FNP-BC

## 2017-06-06 ENCOUNTER — TRANSFERRED RECORDS (OUTPATIENT)
Dept: HEALTH INFORMATION MANAGEMENT | Facility: CLINIC | Age: 47
End: 2017-06-06

## 2017-07-24 ENCOUNTER — OFFICE VISIT (OUTPATIENT)
Dept: FAMILY MEDICINE | Facility: CLINIC | Age: 47
End: 2017-07-24
Payer: COMMERCIAL

## 2017-07-24 VITALS
HEART RATE: 76 BPM | WEIGHT: 163.6 LBS | TEMPERATURE: 98.4 F | OXYGEN SATURATION: 97 % | BODY MASS INDEX: 27.93 KG/M2 | DIASTOLIC BLOOD PRESSURE: 71 MMHG | SYSTOLIC BLOOD PRESSURE: 103 MMHG

## 2017-07-24 DIAGNOSIS — B37.31 VAGINAL YEAST INFECTION: Primary | ICD-10-CM

## 2017-07-24 DIAGNOSIS — N89.8 VAGINAL ITCHING: ICD-10-CM

## 2017-07-24 LAB
MICRO REPORT STATUS: NORMAL
SPECIMEN SOURCE: NORMAL
WET PREP SPEC: NORMAL

## 2017-07-24 PROCEDURE — 87210 SMEAR WET MOUNT SALINE/INK: CPT | Performed by: NURSE PRACTITIONER

## 2017-07-24 PROCEDURE — 99213 OFFICE O/P EST LOW 20 MIN: CPT | Performed by: NURSE PRACTITIONER

## 2017-07-24 RX ORDER — FLUCONAZOLE 150 MG/1
150 TABLET ORAL ONCE
Qty: 2 TABLET | Refills: 0 | Status: SHIPPED | OUTPATIENT
Start: 2017-07-24 | End: 2017-07-24

## 2017-07-24 NOTE — PATIENT INSTRUCTIONS
Vaginal Infection: Yeast (Candidiasis)  Yeast infection occurs when yeast in the vagina increase and start attacking the vaginal tissues. Yeast is a type of fungus. These infections are often caused by a type of yeast called Candida albicans. Other species of yeast can also cause infections. Factors that may make infection more likely include recent antibiotic use, douching, or increased sex. Yeast infections are more common in women who have diabetes, or are obese or pregnant, or have a weak immune system.  Symptoms of yeast infection    Clumpy or thin, white discharge, which may look like cottage cheese    No odor or minimal odor    Severe vaginal itching or burning    Burning with urination    Swelling, redness of vulva    Pain during sex  Treating yeast infection  Yeast infection is treated with a vaginal antifungal cream. In some cases, antifungal pills are prescribed instead. During treatment:    Finish all of your medicine, even if your symptoms go away.    Apply the cream before going to bed. Lie flat after applying so that it doesn't drip out.    Do not douche or use tampons.    Don't rely on a diaphragm or condoms, since the cream may weaken them.    Avoid intercourse if advised by your healthcare provider.     Should I treat a yeast infection myself?  Discuss with your healthcare provider whether you should use over-the-counter medicines to treat a yeast infection. Self-treatment may depend on whether:    You've had a yeast infection in the past.    You're at risk for STDs.  Call your healthcare provider if symptoms do not go away or come back after treatment.   Date Last Reviewed: 5/12/2015 2000-2017 [employer]. [address] [place] [address], [place] [address]. All rights reserved. This information is not intended as a substitute for professional medical care. Always follow your healthcare professional's instructions.

## 2017-07-24 NOTE — PROGRESS NOTES
SUBJECTIVE:                                                    Arcelia Liz is a 47 year old female who presents to clinic today for the following health issues:      Vaginal Itching      Duration: x1week    Description  itching    Intensity:  Moderate; similar to previous vaginal yeast infection; no risk for sti    Accompanying signs and symptoms (fever/dysuria/abdominal or back pain): None    History  Sexually active: yes  Possibility of pregnancy: No  Recent antibiotic use: no     Precipitating or alleviating factors: None    Therapies tried and outcome: none           Problem list and histories reviewed & adjusted, as indicated.  Additional history: as documented    Patient Active Problem List   Diagnosis     CARDIOVASCULAR SCREENING; LDL GOAL LESS THAN 160     Epigastric pain     Mantoux: positive     Inflammatory acne     Comedonal acne     Anemia     Past Surgical History:   Procedure Laterality Date     COLONOSCOPY  9/6/2012    Procedure: COLONOSCOPY;  COLONOSCOPY, CHRONIC LOWER ABD PAIN;  Surgeon: Jordan Mitchell MD;  Location: [facility]     DILATION AND CURETTAGE  2005    MAB, twin gestation       Social History   Substance Use Topics     Smoking status: Never Smoker     Smokeless tobacco: Never Used      Comment: Lives in smoke free household     Alcohol use No     Family History   Problem Relation Age of Onset     Asthma Father      DIABETES Father      Glaucoma No family hx of      Macular Degeneration No family hx of      Thyroid Disease No family hx of      CEREBROVASCULAR DISEASE No family hx of      CANCER No family hx of      Hypertension No family hx of      C.A.D. No family hx of      Lupus No family hx of      Thrombophilia No family hx of      Psoriasis No family hx of      Eczema No family hx of      Melanoma No family hx of          Current Outpatient Prescriptions   Medication Sig Dispense Refill     fluconazole (DIFLUCAN) 150 MG tablet Take 1 tablet (150 mg) by mouth once for 1 dose  If still symptomatic on day 3, take second tablet. 2 tablet 0     polyethylene glycol (MIRALAX) powder Take 17 g (1 capful) by mouth daily 510 g 1     omeprazole (PRILOSEC) 20 MG CR capsule Take 1 capsule (20 mg) by mouth daily 90 capsule 1     Allergies   Allergen Reactions     Nkda [No Known Drug Allergies]      BP Readings from Last 3 Encounters:   07/24/17 103/71   04/10/17 144/86   04/03/17 124/85    Wt Readings from Last 3 Encounters:   07/24/17 163 lb 9.6 oz (74.2 kg)   04/10/17 167 lb 6.4 oz (75.9 kg)   04/03/17 169 lb 6.4 oz (76.8 kg)            Labs reviewed in EPIC    Reviewed and updated as needed this visit by clinical staffTobacco  Allergies  Meds  Med Hx  Surg Hx  Fam Hx  Soc Hx      Reviewed and updated as needed this visit by Provider         ROS:  Constitutional, HEENT, cardiovascular, pulmonary, GI, , musculoskeletal, neuro, skin, endocrine and psych systems are negative, except as otherwise noted.      OBJECTIVE:   /71  Pulse 76  Temp 98.4  F (36.9  C) (Oral)  Wt 163 lb 9.6 oz (74.2 kg)  SpO2 97%  BMI 27.93 kg/m2  Body mass index is 27.93 kg/(m^2).  GENERAL: healthy, alert and no distress  RESP: lungs clear to auscultation - no rales, rhonchi or wheezes  CV: regular rate and rhythm, normal S1 S2, no S3 or S4, no murmur, click or rub, no peripheral edema and peripheral pulses strong   (female):  normal urethral meatus, vaginal mucosa pink, moist, well rugated, and normal  cervix/adnexa/uterus without masses  POSITIVE for erythematous, edematous female external genitalia with white thick discharge.   PSYCH: mentation appears normal, affect normal/bright    Diagnostic Test Results:  See orders    ASSESSMENT/PLAN:         ICD-10-CM    1. Vaginal yeast infection B37.3 fluconazole (DIFLUCAN) 150 MG tablet   2. Vaginal itching L29.8 Wet prep       See Patient Instructions    Lily Galindo, [facility] [place]

## 2017-07-24 NOTE — NURSING NOTE
"Chief Complaint   Patient presents with     Vaginal Itching       Initial /71  Pulse 76  Temp 98.4  F (36.9  C) (Oral)  Wt 163 lb 9.6 oz (74.2 kg)  SpO2 97%  BMI 27.93 kg/m2 Estimated body mass index is 27.93 kg/(m^2) as calculated from the following:    Height as of 11/21/16: 5' 4.17\" (1.63 m).    Weight as of this encounter: 163 lb 9.6 oz (74.2 kg).  Medication Reconciliation: complete     Scot Rajput CMA    "

## 2017-07-24 NOTE — MR AVS SNAPSHOT
After Visit Summary   7/24/2017    Arcelia Liz    MRN: [id number]           Patient Information     Date Of Birth          1970        Visit Information        Provider Department      7/24/2017 11:20 AM Lily Galindo NP [facility]        Today's Diagnoses     Vaginal itching    -  1    Vaginal yeast infection          Care Instructions      Vaginal Infection: Yeast (Candidiasis)  Yeast infection occurs when yeast in the vagina increase and start attacking the vaginal tissues. Yeast is a type of fungus. These infections are often caused by a type of yeast called Candida albicans. Other species of yeast can also cause infections. Factors that may make infection more likely include recent antibiotic use, douching, or increased sex. Yeast infections are more common in women who have diabetes, or are obese or pregnant, or have a weak immune system.  Symptoms of yeast infection    Clumpy or thin, white discharge, which may look like cottage cheese    No odor or minimal odor    Severe vaginal itching or burning    Burning with urination    Swelling, redness of vulva    Pain during sex  Treating yeast infection  Yeast infection is treated with a vaginal antifungal cream. In some cases, antifungal pills are prescribed instead. During treatment:    Finish all of your medicine, even if your symptoms go away.    Apply the cream before going to bed. Lie flat after applying so that it doesn't drip out.    Do not douche or use tampons.    Don't rely on a diaphragm or condoms, since the cream may weaken them.    Avoid intercourse if advised by your healthcare provider.     Should I treat a yeast infection myself?  Discuss with your healthcare provider whether you should use over-the-counter medicines to treat a yeast infection. Self-treatment may depend on whether:    You've had a yeast infection in the past.    You're at risk for STDs.  Call your healthcare provider if symptoms do not go away  or come back after treatment.   Date Last Reviewed: 5/12/2015 2000-2017 [employer]. [address] [place] [address], [place] [address]. All rights reserved. This information is not intended as a substitute for professional medical care. Always follow your healthcare professional's instructions.                Follow-ups after your visit        Follow-up notes from your care team     Return if symptoms worsen or fail to improve.      Who to contact     Normal or non-critical lab and imaging results will be communicated to you by [employer], letter or phone within 4 business days after the clinic has received the results. If you do not hear from us within 7 days, please contact the clinic through [employer] or phone. If you have a critical or abnormal lab result, we will notify you by phone as soon as possible.  Submit refill requests through [employer] or call your pharmacy and they will forward the refill request to us. Please allow 3 business days for your refill to be completed.          If you need to speak with a  for additional information , please call: [phone]             Additional Information About Your Visit        [employer] Information     [employer] gives you secure access to your electronic health record. If you see a primary care provider, you can also send messages to your care team and make appointments. If you have questions, please call your primary care clinic.  If you do not have a primary care provider, please call [phone] and they will assist you.        Care EveryWhere ID     This is your Care EveryWhere ID. This could be used by other organizations to access your [place] medical records  [id number]        Your Vitals Were     Pulse Temperature Pulse Oximetry BMI (Body Mass Index)          76 98.4  F (36.9  C) (Oral) 97% 27.93 kg/m2         Blood Pressure from Last 3 Encounters:   07/24/17 103/71   04/10/17 144/86   04/03/17 124/85    Weight from Last 3 Encounters:    07/24/17 163 lb 9.6 oz (74.2 kg)   04/10/17 167 lb 6.4 oz (75.9 kg)   04/03/17 169 lb 6.4 oz (76.8 kg)              We Performed the Following     Wet prep          Today's Medication Changes          These changes are accurate as of: 7/24/17 11:44 AM.  If you have any questions, ask your nurse or doctor.               Start taking these medicines.        Dose/Directions    fluconazole 150 MG tablet   Commonly known as:  DIFLUCAN   Used for:  Vaginal yeast infection   Started by:  Lily Galindo NP        Dose:  150 mg   Take 1 tablet (150 mg) by mouth once for 1 dose If still symptomatic on day 3, take second tablet.   Quantity:  2 tablet   Refills:  0            Where to get your medicines      These medications were sent to [facility] [place]  [place]  [address] [address] [address]     Phone:  [phone]     fluconazole 150 MG tablet                Primary Care Provider Office Phone # Fax #    Maria Baldwin PA-C [phone]       [facility] [address] [place] [address] [address]  [place] [address]        Equal Access to Services     [facility] GIAN AH: Hadii aad ku hadasho Soomaali, waaxda luqadaha, qaybta kaalmada adeegyada, waxay magaly hayrosan rhona reynolds ah. So [facility] [phone].    ATENCIÓN: Si habla español, tiene a adams disposición servicios gratuitos de asistencia lingüística. [facility] [phone].    We comply with applicable federal civil rights laws and [place] laws. We do not discriminate on the basis of race, color, national origin, age, disability sex, sexual orientation or gender identity.            Thank you!     Thank you for choosing [facility]  for your care. Our goal is always to provide you with excellent care. Hearing back from our patients is one way we can continue to improve our services. Please take a few minutes to complete the written survey that you may receive in the mail after your visit with us. Thank  you!             Your Updated Medication List - Protect others around you: Learn how to safely use, store and throw away your medicines at www.disposemymeds.org.          This list is accurate as of: 7/24/17 11:44 AM.  Always use your most recent med list.                   Brand Name Dispense Instructions for use Diagnosis    fluconazole 150 MG tablet    DIFLUCAN    2 tablet    Take 1 tablet (150 mg) by mouth once for 1 dose If still symptomatic on day 3, take second tablet.    Vaginal yeast infection       omeprazole 20 MG CR capsule    priLOSEC    90 capsule    Take 1 capsule (20 mg) by mouth daily    Gastroesophageal reflux disease without esophagitis       polyethylene glycol powder    MIRALAX    510 g    Take 17 g (1 capful) by mouth daily    Constipation, unspecified constipation type

## 2017-08-28 ENCOUNTER — ALLIED HEALTH/NURSE VISIT (OUTPATIENT)
Dept: NURSING | Facility: CLINIC | Age: 47
End: 2017-08-28
Payer: COMMERCIAL

## 2017-08-28 DIAGNOSIS — Z23 ENCOUNTER FOR IMMUNIZATION: Primary | ICD-10-CM

## 2017-08-28 PROCEDURE — 90471 IMMUNIZATION ADMIN: CPT

## 2017-08-28 PROCEDURE — 90746 HEPB VACCINE 3 DOSE ADULT IM: CPT

## 2017-08-28 PROCEDURE — 99207 ZZC NO CHARGE NURSE ONLY: CPT

## 2017-08-28 NOTE — PROGRESS NOTES
Screening Questionnaire for Adult Immunization    Are you sick today?   No   Do you have allergies to medications, food, a vaccine component or latex?   No   Have you ever had a serious reaction after receiving a vaccination?   No   Do you have a long-term health problem with heart disease, lung disease, asthma, kidney disease, metabolic disease (e.g. diabetes), anemia, or other blood disorder?   No   Do you have cancer, leukemia, HIV/AIDS, or any other immune system problem?   No   In the past 3 months, have you taken medications that affect  your immune system, such as prednisone, other steroids, or anticancer drugs; drugs for the treatment of rheumatoid arthritis, Crohn s disease, or psoriasis; or have you had radiation treatments?   No   Have you had a seizure, or a brain or other nervous system problem?   No   During the past year, have you received a transfusion of blood or blood     products, or been given immune (gamma) globulin or antiviral drug?   No   For women: Are you pregnant or is there a chance you could become        pregnant during the next month?   No   Have you received any vaccinations in the past 4 weeks?   No     Immunization questionnaire answers were all negative.        Per orders of Maria Baldwin, injection of Hep B given by Francheska Landeros. Patient instructed to remain in clinic for 15 minutes afterwards, and to report any adverse reaction to me immediately.       Screening performed by Francheska Landeros on 8/28/2017 at 11:21 AM.

## 2017-08-28 NOTE — MR AVS SNAPSHOT
After Visit Summary   8/28/2017    Arcelia Liz    MRN: [id number]           Patient Information     Date Of Birth          1970        Visit Information        Provider Department      8/28/2017 11:15 AM [facility]LL[facility] [place] Mathew Carmona        Today's Diagnoses     Encounter for immunization    -  1       Follow-ups after your visit        Who to contact     If you have questions or need follow up information about today's clinic visit or your schedule please contact [facility] directly at [phone].  Normal or non-critical lab and imaging results will be communicated to you by MyChart, letter or phone within 4 business days after the clinic has received the results. If you do not hear from us within 7 days, please contact the clinic through [employer] or phone. If you have a critical or abnormal lab result, we will notify you by phone as soon as possible.  Submit refill requests through KitOrder or call your pharmacy and they will forward the refill request to us. Please allow 3 business days for your refill to be completed.          Additional Information About Your Visit        MyChart Information     KitOrder gives you secure access to your electronic health record. If you see a primary care provider, you can also send messages to your care team and make appointments. If you have questions, please call your primary care clinic.  If you do not have a primary care provider, please call [phone] and they will assist you.        Care EveryWhere ID     This is your Care EveryWhere ID. This could be used by other organizations to access your [place] medical records  [id number]         Blood Pressure from Last 3 Encounters:   07/24/17 103/71   04/10/17 144/86   04/03/17 124/85    Weight from Last 3 Encounters:   07/24/17 163 lb 9.6 oz (74.2 kg)   04/10/17 167 lb 6.4 oz (75.9 kg)   04/03/17 169 lb 6.4 oz (76.8 kg)              We Performed the Following     HEPATITIS B  VACCINE,ADULT,IM     VACCINE ADMINISTRATION, INITIAL        Primary Care Provider Office Phone # Fax #    Maria Baldwin PA-C [phone]       [address] [place] [address]        Equal Access to Services     [facility]: Hadii aad ku hadasho Soomaali, waaxda luqadaha, qaybta kaalmada adeegyada, waxay idiin hayaan adeeg kharash lastephanien . So [facility] [phone].    ATENCIÓN: Si habla español, tiene a adams disposición servicios gratuitos de asistencia lingüística. Llame al [phone].    We comply with applicable federal civil rights laws and [place] laws. We do not discriminate on the basis of race, color, national origin, age, disability sex, sexual orientation or gender identity.            Thank you!     Thank you for choosing [facility]  for your care. Our goal is always to provide you with excellent care. Hearing back from our patients is one way we can continue to improve our services. Please take a few minutes to complete the written survey that you may receive in the mail after your visit with us. Thank you!             Your Updated Medication List - Protect others around you: Learn how to safely use, store and throw away your medicines at www.disposemymeds.org.          This list is accurate as of: 8/28/17 11:22 AM.  Always use your most recent med list.                   Brand Name Dispense Instructions for use Diagnosis    omeprazole 20 MG CR capsule    priLOSEC    90 capsule    Take 1 capsule (20 mg) by mouth daily    Gastroesophageal reflux disease without esophagitis       polyethylene glycol powder    MIRALAX    510 g    Take 17 g (1 capful) by mouth daily    Constipation, unspecified constipation type

## 2017-09-19 ENCOUNTER — TELEPHONE (OUTPATIENT)
Dept: FAMILY MEDICINE | Facility: CLINIC | Age: 47
End: 2017-09-19

## 2017-09-19 ENCOUNTER — OFFICE VISIT (OUTPATIENT)
Dept: INTERNAL MEDICINE | Facility: CLINIC | Age: 47
End: 2017-09-19
Payer: COMMERCIAL

## 2017-09-19 VITALS
OXYGEN SATURATION: 100 % | SYSTOLIC BLOOD PRESSURE: 110 MMHG | HEART RATE: 90 BPM | WEIGHT: 167 LBS | DIASTOLIC BLOOD PRESSURE: 76 MMHG | TEMPERATURE: 98.5 F | BODY MASS INDEX: 28.51 KG/M2

## 2017-09-19 DIAGNOSIS — N89.8 VAGINAL DISCHARGE: Primary | ICD-10-CM

## 2017-09-19 DIAGNOSIS — N89.8 VAGINAL ITCHING: ICD-10-CM

## 2017-09-19 LAB
ALBUMIN UR-MCNC: NEGATIVE MG/DL
APPEARANCE UR: CLEAR
BILIRUB UR QL STRIP: NEGATIVE
COLOR UR AUTO: YELLOW
GLUCOSE UR STRIP-MCNC: NEGATIVE MG/DL
HGB UR QL STRIP: NEGATIVE
KETONES UR STRIP-MCNC: NEGATIVE MG/DL
LEUKOCYTE ESTERASE UR QL STRIP: NEGATIVE
NITRATE UR QL: NEGATIVE
PH UR STRIP: 7 PH (ref 5–7)
SOURCE: NORMAL
SP GR UR STRIP: 1.01 (ref 1–1.03)
SPECIMEN SOURCE: NORMAL
UROBILINOGEN UR STRIP-ACNC: 0.2 EU/DL (ref 0.2–1)
WET PREP SPEC: NORMAL

## 2017-09-19 PROCEDURE — 87210 SMEAR WET MOUNT SALINE/INK: CPT | Performed by: NURSE PRACTITIONER

## 2017-09-19 PROCEDURE — 99213 OFFICE O/P EST LOW 20 MIN: CPT | Performed by: NURSE PRACTITIONER

## 2017-09-19 PROCEDURE — 81003 URINALYSIS AUTO W/O SCOPE: CPT | Performed by: NURSE PRACTITIONER

## 2017-09-19 RX ORDER — TRIAMCINOLONE ACETONIDE 1 MG/G
CREAM TOPICAL
Qty: 30 G | Refills: 0 | Status: SHIPPED | OUTPATIENT
Start: 2017-09-19 | End: 2017-10-06

## 2017-09-19 ASSESSMENT — PAIN SCALES - GENERAL: PAINLEVEL: WORST PAIN (10)

## 2017-09-19 NOTE — TELEPHONE ENCOUNTER
Spoke with pt and she says she knows she still has an infection, has itching and burning. She was treated in July for yeast infection, she said it got a little better but never completely went away. She said she wasn't able to come to her appt yesterday because she didn't have a car. She is borrowing her sisters car today and says she can come in to give whatever sample you need. There were no appt openings here, she made one at [facility] but would prefer to come here if she can get a lab only appt.   Please advise.

## 2017-09-19 NOTE — PROGRESS NOTES
SUBJECTIVE:   Arcelia Liz is a 47 year old female who presents to clinic today for the following health issues:      Vaginal Symptoms  Onset: Ongoing since July    Description:  Vaginal Discharge: none   Itching (Pruritis): YES  Burning sensation:  YES  Odor: no     Accompanying Signs & Symptoms:  Pain with Urination: YES  Abdominal Pain: no   Fever: no     History:   Sexually active: YES  New Partner: no   Possibility of Pregnancy:  No    Precipitating factors:   Recent Antibiotic Use: no     Alleviating factors:      Therapies Tried and outcome: none    Patient denies possibility of STI.  Pruritis is to vulvar area.  Patient was previously treated with diflucan, but had negative yeast on wet prep.    Problem list and histories reviewed & adjusted, as indicated.  Additional history: as documented    Patient Active Problem List   Diagnosis     CARDIOVASCULAR SCREENING; LDL GOAL LESS THAN 160     Epigastric pain     Mantoux: positive     Inflammatory acne     Comedonal acne     Anemia     Past Surgical History:   Procedure Laterality Date     COLONOSCOPY  9/6/2012    Procedure: COLONOSCOPY;  COLONOSCOPY, CHRONIC LOWER ABD PAIN;  Surgeon: Jordan Mitchell MD;  Location: [facility] OR     DILATION AND CURETTAGE  2005    MAB, twin gestation       Social History   Substance Use Topics     Smoking status: Never Smoker     Smokeless tobacco: Never Used      Comment: Lives in smoke free household     Alcohol use No     Family History   Problem Relation Age of Onset     Asthma Father      DIABETES Father      Glaucoma No family hx of      Macular Degeneration No family hx of      Thyroid Disease No family hx of      CEREBROVASCULAR DISEASE No family hx of      CANCER No family hx of      Hypertension No family hx of      C.A.D. No family hx of      Lupus No family hx of      Thrombophilia No family hx of      Psoriasis No family hx of      Eczema No family hx of      Melanoma No family hx of          Current Outpatient  Prescriptions   Medication Sig Dispense Refill     triamcinolone (KENALOG) 0.1 % cream Apply sparingly to affected area three times daily for 14 days. 30 g 0     polyethylene glycol (MIRALAX) powder Take 17 g (1 capful) by mouth daily (Patient not taking: Reported on 9/19/2017) 510 g 1     omeprazole (PRILOSEC) 20 MG CR capsule Take 1 capsule (20 mg) by mouth daily (Patient not taking: Reported on 9/19/2017) 90 capsule 1     Allergies   Allergen Reactions     Nkda [No Known Drug Allergies]      BP Readings from Last 3 Encounters:   09/19/17 110/76   07/24/17 103/71   04/10/17 144/86    Wt Readings from Last 3 Encounters:   09/19/17 167 lb (75.8 kg)   07/24/17 163 lb 9.6 oz (74.2 kg)   04/10/17 167 lb 6.4 oz (75.9 kg)                  Labs reviewed in EPIC        Reviewed and updated as needed this visit by clinical staff     Reviewed and updated as needed this visit by Provider         ROS:  Constitutional, HEENT, cardiovascular, pulmonary, gi and gu systems are negative, except as otherwise noted.      OBJECTIVE:   /76  Pulse 90  Temp 98.5  F (36.9  C) (Oral)  Wt 167 lb (75.8 kg)  SpO2 100%  BMI 28.51 kg/m2  Body mass index is 28.51 kg/(m^2).  GENERAL: healthy, alert and no distress  RESP: lungs clear to auscultation - no rales, rhonchi or wheezes  CV: regular rate and rhythm, normal S1 S2, no S3 or S4, no murmur, click or rub, no peripheral edema and peripheral pulses strong  ABDOMEN: soft, nontender, no hepatosplenomegaly, no masses and bowel sounds normal   (female): normal urethral meatus  and labia absent, no erythema, rash noted to vulva or groin  MS: no gross musculoskeletal defects noted, no edema    Diagnostic Test Results:  Results for orders placed or performed in visit on 09/19/17 (from the past 24 hour(s))   Wet prep   Result Value Ref Range    Specimen Description Vagina     Wet Prep No Trichomonas seen     Wet Prep No yeast seen     Wet Prep No clue cells seen    *UA reflex to  Microscopic and Culture ([place] and [facility] (except [place] and [place])   Result Value Ref Range    Color Urine Yellow     Appearance Urine Clear     Glucose Urine Negative NEG^Negative mg/dL    Bilirubin Urine Negative NEG^Negative    Ketones Urine Negative NEG^Negative mg/dL    Specific Gravity Urine 1.015 1.003 - 1.035    Blood Urine Negative NEG^Negative    pH Urine 7.0 5.0 - 7.0 pH    Protein Albumin Urine Negative NEG^Negative mg/dL    Urobilinogen Urine 0.2 0.2 - 1.0 EU/dL    Nitrite Urine Negative NEG^Negative    Leukocyte Esterase Urine Negative NEG^Negative    Source Midstream Urine        ASSESSMENT/PLAN:     1. Vaginal discharge    - Wet prep  - *UA reflex to Microscopic and Culture ([facility] (except [place] and [place])    2. Vaginal itching  Normal wet prep and urine.  Patient declines gonorrhea/chlamydia testing.  Will have patient use triamcinolone for vulvar pruritis.  - *UA reflex to Microscopic and Culture ([facility] (except [place] and [place])  - triamcinolone (KENALOG) 0.1 % cream; Apply sparingly to affected area three times daily for 14 days.  Dispense: 30 g; Refill: 0    FUTURE APPOINTMENTS:       - Follow-up for annual visit or as needed    NAKUL Daniel CNP  [facility]

## 2017-09-19 NOTE — MR AVS SNAPSHOT
After Visit Summary   9/19/2017    Arcelia Liz    MRN: [id number]           Patient Information     Date Of Birth          1970        Visit Information        Provider Department      9/19/2017 2:00 PM Anaid Mccauley APRN CNP [facility]        Today's Diagnoses     Vaginal discharge    -  1    Vaginal itching          Care Instructions    [facility]    If you have any questions regarding to your visit please contact your care team:     Team Pink:   Clinic Hours Telephone Number   Internal Medicine:  Dr. Nikki Mccauley, NP       7am-7pm  Monday - Thursday   7am-5pm  Fridays  [phone]  (Appointment scheduling available 24/7)    Questions about your visit?  Team Line  [phone]   Urgent Care - [place] and [place] - 11am-9pm Monday-Friday Saturday-Sunday- 9am-5pm   [place] - 5pm-9pm Monday-Friday Saturday-Sunday- 9am-5pm  [phone] - [place]   [phone] - [place]       What options do I have for visits at the clinic other than the traditional office visit?  To expand how we care for you, many of our providers are utilizing electronic visits (e-visits) and telephone visits, when medically appropriate, for interactions with their patients rather than a visit in the clinic.   We also offer nurse visits for many medical concerns. Just like any other service, we will bill your insurance company for this type of visit based on time spent on the phone with your provider. Not all insurance companies cover these visits. Please check with your medical insurance if this type of visit is covered. You will be responsible for any charges that are not paid by your insurance.      E-visits via ONE Change:  generally incur a $35.00 fee.  Telephone visits:  Time spent on the phone: *charged based on time that is spent on the phone in increments of 10 minutes. Estimated cost:   5-10 mins $30.00   11-20 mins.  $59.00   21-30 mins. $85.00   Use Octoniushart (secure email communication and access to your chart) to send your primary care provider a message or make an appointment. Ask someone on your Team how to sign up for Octoniushart.    For a Price Quote for your services, please call our Consumer Price Line at [phone].    As always, Thank you for trusting us with your health care needs!    Susan Baez, [facility]            Follow-ups after your visit        Who to contact     If you have questions or need follow up information about today's clinic visit or your schedule please contact [facility] directly at [phone].  Normal or non-critical lab and imaging results will be communicated to you by Octoniushart, letter or phone within 4 business days after the clinic has received the results. If you do not hear from us within 7 days, please contact the clinic through Octoniushart or phone. If you have a critical or abnormal lab result, we will notify you by phone as soon as possible.  Submit refill requests through [employer] Mobile or call your pharmacy and they will forward the refill request to us. Please allow 3 business days for your refill to be completed.          Additional Information About Your Visit        Octoniushart Information     Kypt gives you secure access to your electronic health record. If you see a primary care provider, you can also send messages to your care team and make appointments. If you have questions, please call your primary care clinic.  If you do not have a primary care provider, please call [phone] and they will assist you.        Care EveryWhere ID     This is your Care EveryWhere ID. This could be used by other organizations to access your [place] medical records  [id number]        Your Vitals Were     Pulse Temperature Pulse Oximetry BMI (Body Mass Index)          90 98.5  F (36.9  C) (Oral) 100% 28.51 kg/m2         Blood Pressure from Last 3 Encounters:   09/19/17 110/76   07/24/17 103/71    04/10/17 144/86    Weight from Last 3 Encounters:   09/19/17 167 lb (75.8 kg)   07/24/17 163 lb 9.6 oz (74.2 kg)   04/10/17 167 lb 6.4 oz (75.9 kg)              We Performed the Following     *UA reflex to Microscopic and Culture ([place] and [facility] (except [place] and [place])     Wet prep          Today's Medication Changes          These changes are accurate as of: 9/19/17  3:08 PM.  If you have any questions, ask your nurse or doctor.               Start taking these medicines.        Dose/Directions    triamcinolone 0.1 % cream   Commonly known as:  KENALOG   Used for:  Vaginal itching   Started by:  Anaid Mccauely APRN CNP        Apply sparingly to affected area three times daily for 14 days.   Quantity:  30 g   Refills:  0            Where to get your medicines      These medications were sent to [facility] - [place], [place] - [address]  [address], [place] [address]     Phone:  [phone]     triamcinolone 0.1 % cream                Primary Care Provider Office Phone # Fax #    Maria Baldwin PA-C [phone]       [address] [place] [address]        Equal Access to Services     [facility]: Hadii aad ku hadasho Soomaali, waaxda luqadaha, qaybta kaalmada adeegyada, waxay idiin hayaan rhona khirving reynolds . So [facility] [phone].    ATENCIÓN: Si habla español, tiene a adams disposición servicios gratuitos de asistencia lingüística. Llame al [phone].    We comply with applicable federal civil rights laws and [place] laws. We do not discriminate on the basis of race, color, national origin, age, disability sex, sexual orientation or gender identity.            Thank you!     Thank you for choosing [facility]  for your care. Our goal is always to provide you with excellent care. Hearing back from our patients is one way we can continue to improve our services. Please take a few minutes to  complete the written survey that you may receive in the mail after your visit with us. Thank you!             Your Updated Medication List - Protect others around you: Learn how to safely use, store and throw away your medicines at www.disposemymeds.org.          This list is accurate as of: 9/19/17  3:08 PM.  Always use your most recent med list.                   Brand Name Dispense Instructions for use Diagnosis    omeprazole 20 MG CR capsule    priLOSEC    90 capsule    Take 1 capsule (20 mg) by mouth daily    Gastroesophageal reflux disease without esophagitis       polyethylene glycol powder    MIRALAX    510 g    Take 17 g (1 capful) by mouth daily    Constipation, unspecified constipation type       triamcinolone 0.1 % cream    KENALOG    30 g    Apply sparingly to affected area three times daily for 14 days.    Vaginal itching

## 2017-09-19 NOTE — NURSING NOTE
"Chief Complaint   Patient presents with     Vaginal Problem      Itching, Burning Was seen in July for same symptoms and has not improved       Initial /76  Pulse 90  Temp 98.5  F (36.9  C) (Oral)  Wt 167 lb (75.8 kg)  SpO2 100%  BMI 28.51 kg/m2 Estimated body mass index is 28.51 kg/(m^2) as calculated from the following:    Height as of 11/21/16: 5' 4.17\" (1.63 m).    Weight as of this encounter: 167 lb (75.8 kg).  Medication Reconciliation: complete   Gillian Shi MA    "

## 2017-09-19 NOTE — TELEPHONE ENCOUNTER
Patient is calling stated that she wanted to know if provider can just prescribe a medication for a Yeast infection. Patient thinks she doesn't need to come in to see provider, she knows she has a yeast infection.  did set an appointment with patient in [place] but would like to speak with provider.   Please call to advise  Thank you

## 2017-09-19 NOTE — PATIENT INSTRUCTIONS
[facility]    If you have any questions regarding to your visit please contact your care team:     Team Pink:   Clinic Hours Telephone Number   Internal Medicine:  Dr. Nikki Mccauley NP       7am-7pm  Monday - Thursday   7am-5pm  Fridays  [phone]  (Appointment scheduling available 24/7)    Questions about your visit?  Team Line  [phone]   Urgent Care - [place] and [facility] - 11am-9pm Monday-Friday Saturday-Sunday- 9am-5pm   [place] - 5pm-9pm Monday-Friday Saturday-Sunday- 9am-5pm  [phone] - [place]   [phone] - [place]       What options do I have for visits at the clinic other than the traditional office visit?  To expand how we care for you, many of our providers are utilizing electronic visits (e-visits) and telephone visits, when medically appropriate, for interactions with their patients rather than a visit in the clinic.   We also offer nurse visits for many medical concerns. Just like any other service, we will bill your insurance company for this type of visit based on time spent on the phone with your provider. Not all insurance companies cover these visits. Please check with your medical insurance if this type of visit is covered. You will be responsible for any charges that are not paid by your insurance.      E-visits via As Seen on TV:  generally incur a $35.00 fee.  Telephone visits:  Time spent on the phone: *charged based on time that is spent on the phone in increments of 10 minutes. Estimated cost:   5-10 mins $30.00   11-20 mins. $59.00   21-30 mins. $85.00   Use Pencil You Int (secure email communication and access to your chart) to send your primary care provider a message or make an appointment. Ask someone on your Team how to sign up for As Seen on TV.    For a Price Quote for your services, please call our Consumer Price Line at [phone].    As always, Thank you for trusting us with your health care  needs!    Susan Baez, CMA

## 2017-10-06 ENCOUNTER — OFFICE VISIT (OUTPATIENT)
Dept: FAMILY MEDICINE | Facility: CLINIC | Age: 47
End: 2017-10-06
Payer: COMMERCIAL

## 2017-10-06 VITALS
HEART RATE: 89 BPM | OXYGEN SATURATION: 100 % | DIASTOLIC BLOOD PRESSURE: 77 MMHG | WEIGHT: 169.2 LBS | BODY MASS INDEX: 28.89 KG/M2 | TEMPERATURE: 98.3 F | SYSTOLIC BLOOD PRESSURE: 118 MMHG

## 2017-10-06 DIAGNOSIS — N89.8 VAGINAL ITCHING: Primary | ICD-10-CM

## 2017-10-06 LAB
SPECIMEN SOURCE: NORMAL
WET PREP SPEC: NORMAL

## 2017-10-06 PROCEDURE — 99214 OFFICE O/P EST MOD 30 MIN: CPT | Performed by: NURSE PRACTITIONER

## 2017-10-06 PROCEDURE — 87210 SMEAR WET MOUNT SALINE/INK: CPT | Performed by: NURSE PRACTITIONER

## 2017-10-06 RX ORDER — FLUCONAZOLE 150 MG/1
150 TABLET ORAL
Qty: 4 TABLET | Refills: 0 | Status: SHIPPED | OUTPATIENT
Start: 2017-10-06 | End: 2017-10-16

## 2017-10-06 RX ORDER — METRONIDAZOLE 500 MG/1
500 TABLET ORAL 2 TIMES DAILY
Qty: 14 TABLET | Refills: 0 | Status: SHIPPED | OUTPATIENT
Start: 2017-10-06 | End: 2017-10-16

## 2017-10-06 NOTE — MR AVS SNAPSHOT
After Visit Summary   10/6/2017    Arcelia Liz    MRN: [id number]           Patient Information     Date Of Birth          1970        Visit Information        Provider Department      10/6/2017 2:40 PM Lily Galindo NP [facility]        Today's Diagnoses     Vaginal itching    -  1      Care Instructions    If your symptoms persist, let me know and we can try vaginal estrogen.  If symptoms continue, I will send you to OBGYN for further evaluation.     Preventing Vaginal Infection  These steps can help you stay comfortable during treatment of a vaginal infection. They also help prevent vaginal infections in the future.  Keeping a healthy balance  Factors that change the normal balance in the vagina can lead to a vaginal infection. To help keep the balance normal, try these tips:    Change out of wet bathing suits and damp exercise clothes as soon as possible. Yeast thrive in a warm, moist environment.    Avoid wearing tight pants. Choose cotton underwear and pantyhose that have a cotton crotch. Cotton keeps you cooler and drier than synthetics.    Don't douche unless directed by your health care provider. Douching can destroy friendly bacteria and change the vagina's normal balance.    Wipe from front to back after using the toilet. This prevents bacteria from spreading from the anus to the vulva.    Wash the vulva with mild, unscented soap or with plain water.    Wash your diaphragm, spermicide applicators, and sex toys with mild soap and water after use. Dry them thoroughly before putting them away.    Change tampons often (every 2 hours to 4 hours). Leaving a tampon in for too long may disrupt the balance of vaginal bacteria.    Avoid vaginal sprays, scented toilet paper and soaps, and deodorant tampons or pads, which can cause vaginal irritation  Staying healthy overall  Good overall health can help you resist infection. To be healthier:    Help protect yourself from STDs by  using latex condoms for intercourse. Ask your health care provider for more information about safer sex.    Eat a variety of healthy foods.    Exercise regularly.    Get enough rest and sleep.    Maintain a healthy weight. If you need to lose weight, ask your health care provider for advice on how to start.  Date Last Reviewed: 5/18/2015 2000-2017 [employer]. [address] [place] [address], [place] [address]. All rights reserved. This information is not intended as a substitute for professional medical care. Always follow your healthcare professional's instructions.                Follow-ups after your visit        Follow-up notes from your care team     Return if symptoms worsen or fail to improve.      Who to contact     Normal or non-critical lab and imaging results will be communicated to you by [employer]otsAl[employer], letter or phone within 4 business days after the clinic has received the results. If you do not hear from us within 7 days, please contact the clinic through [employer] or phone. If you have a critical or abnormal lab result, we will notify you by phone as soon as possible.  Submit refill requests through [employer]eah or call your pharmacy and they will forward the refill request to us. Please allow 3 business days for your refill to be completed.          If you need to speak with a  for additional information , please call: [phone]             Additional Information About Your Visit        Game[employer]eah Information     [employer] gives you secure access to your electronic health record. If you see a primary care provider, you can also send messages to your care team and make appointments. If you have questions, please call your primary care clinic.  If you do not have a primary care provider, please call [phone] and they will assist you.        Care EveryWhere ID     This is your Care EveryWhere ID. This could be used by other organizations to access your [facility]  records  [id number]        Your Vitals Were     Pulse Temperature Pulse Oximetry BMI (Body Mass Index)          89 98.3  F (36.8  C) (Oral) 100% 28.89 kg/m2         Blood Pressure from Last 3 Encounters:   10/06/17 118/77   09/19/17 110/76   07/24/17 103/71    Weight from Last 3 Encounters:   10/06/17 169 lb 3.2 oz (76.7 kg)   09/19/17 167 lb (75.8 kg)   07/24/17 163 lb 9.6 oz (74.2 kg)              We Performed the Following     Wet prep          Today's Medication Changes          These changes are accurate as of: 10/6/17  3:26 PM.  If you have any questions, ask your nurse or doctor.               Start taking these medicines.        Dose/Directions    fluconazole 150 MG tablet   Commonly known as:  DIFLUCAN   Used for:  Vaginal itching   Started by:  Lily Galindo NP        Dose:  150 mg   Take 1 tablet (150 mg) by mouth every 3 days   Quantity:  4 tablet   Refills:  0       metroNIDAZOLE 500 MG tablet   Commonly known as:  FLAGYL   Used for:  Vaginal itching   Started by:  Lily Galindo NP        Dose:  500 mg   Take 1 tablet (500 mg) by mouth 2 times daily   Quantity:  14 tablet   Refills:  0            Where to get your medicines      These medications were sent to [facility] [place] [address] [address] [address] [address]     Phone:  [phone]     fluconazole 150 MG tablet    metroNIDAZOLE 500 MG tablet                Primary Care Provider Office Phone # Fax #    Maria Baldwin PA-C [phone]       [address] [place] [address] [facility]  [place] [address]        Equal Access to Services     [facility] : Haddaniel Sinder, waaxda lumelony, qaybta kaalisrael lacy. So [facility] [phone].    ATENCIÓN: Si habla español, tiene a adams disposición servicios gratuitos de asistencia lingüística. Llame al [phone].    We comply with applicable federal civil rights laws and [place] laws. We do not  discriminate on the basis of race, color, national origin, age, disability, sex, sexual orientation, or gender identity.            Thank you!     Thank you for choosing [facility]  for your care. Our goal is always to provide you with excellent care. Hearing back from our patients is one way we can continue to improve our services. Please take a few minutes to complete the written survey that you may receive in the mail after your visit with us. Thank you!             Your Updated Medication List - Protect others around you: Learn how to safely use, store and throw away your medicines at www.disposemymeds.org.          This list is accurate as of: 10/6/17  3:26 PM.  Always use your most recent med list.                   Brand Name Dispense Instructions for use Diagnosis    fluconazole 150 MG tablet    DIFLUCAN    4 tablet    Take 1 tablet (150 mg) by mouth every 3 days    Vaginal itching       metroNIDAZOLE 500 MG tablet    FLAGYL    14 tablet    Take 1 tablet (500 mg) by mouth 2 times daily    Vaginal itching

## 2017-10-06 NOTE — NURSING NOTE
"Chief Complaint   Patient presents with     Vaginal Problem       Initial /77  Pulse 89  Temp 98.3  F (36.8  C) (Oral)  Wt 169 lb 3.2 oz (76.7 kg)  SpO2 100%  BMI 28.89 kg/m2 Estimated body mass index is 28.89 kg/(m^2) as calculated from the following:    Height as of 11/21/16: 5' 4.17\" (1.63 m).    Weight as of this encounter: 169 lb 3.2 oz (76.7 kg).  Medication Reconciliation: complete     Monika Winters MA  "

## 2017-10-06 NOTE — PATIENT INSTRUCTIONS
If your symptoms persist, let me know and we can try vaginal estrogen.  If symptoms continue, I will send you to OBGYN for further evaluation.     Preventing Vaginal Infection  These steps can help you stay comfortable during treatment of a vaginal infection. They also help prevent vaginal infections in the future.  Keeping a healthy balance  Factors that change the normal balance in the vagina can lead to a vaginal infection. To help keep the balance normal, try these tips:    Change out of wet bathing suits and damp exercise clothes as soon as possible. Yeast thrive in a warm, moist environment.    Avoid wearing tight pants. Choose cotton underwear and pantyhose that have a cotton crotch. Cotton keeps you cooler and drier than synthetics.    Don't douche unless directed by your health care provider. Douching can destroy friendly bacteria and change the vagina's normal balance.    Wipe from front to back after using the toilet. This prevents bacteria from spreading from the anus to the vulva.    Wash the vulva with mild, unscented soap or with plain water.    Wash your diaphragm, spermicide applicators, and sex toys with mild soap and water after use. Dry them thoroughly before putting them away.    Change tampons often (every 2 hours to 4 hours). Leaving a tampon in for too long may disrupt the balance of vaginal bacteria.    Avoid vaginal sprays, scented toilet paper and soaps, and deodorant tampons or pads, which can cause vaginal irritation  Staying healthy overall  Good overall health can help you resist infection. To be healthier:    Help protect yourself from STDs by using latex condoms for intercourse. Ask your health care provider for more information about safer sex.    Eat a variety of healthy foods.    Exercise regularly.    Get enough rest and sleep.    Maintain a healthy weight. If you need to lose weight, ask your health care provider for advice on how to start.  Date Last Reviewed: 5/18/2015     5214-3327 [employer]. [address] [place] [address], [place] [address]. All rights reserved. This information is not intended as a substitute for professional medical care. Always follow your healthcare professional's instructions.

## 2017-10-06 NOTE — PROGRESS NOTES
SUBJECTIVE:   Arcelia Liz is a 47 year old female who presents to clinic today for the following health issues:      Urinary SYMPTOMS     Onset: 7/24/17-since last visit    Description:   Painful urination (Dysuria): no   Blood in urine (Hematuria): no   Frequency/Urgency: no   Abdominal/Pelvic/Flank Pain : no     Other vaginal symptoms: vaginal itching- No other symptoms besides thick white discharge    Progression of Symptoms:  worsening    History:   History of frequent UTI's: no   History of kidney stones: no   Sexually Active: YES- no risk for STD, pregnancy  New Partner: no     Possibility of pregnancy: No     Therapies Tried and outcome: has finished previous medication. Wanting medication she has had in the past that finally helped her symptoms.        Problem list and histories reviewed & adjusted, as indicated.  Additional history: as documented    Patient Active Problem List   Diagnosis     CARDIOVASCULAR SCREENING; LDL GOAL LESS THAN 160     Epigastric pain     Mantoux: positive     Inflammatory acne     Comedonal acne     Anemia     Past Surgical History:   Procedure Laterality Date     COLONOSCOPY  9/6/2012    Procedure: COLONOSCOPY;  COLONOSCOPY, CHRONIC LOWER ABD PAIN;  Surgeon: Jordan Mitchell MD;  Location: [facility] OR     DILATION AND CURETTAGE  2005    MAB, twin gestation       Social History   Substance Use Topics     Smoking status: Never Smoker     Smokeless tobacco: Never Used      Comment: Lives in smoke free household     Alcohol use No     Family History   Problem Relation Age of Onset     Asthma Father      DIABETES Father      Glaucoma No family hx of      Macular Degeneration No family hx of      Thyroid Disease No family hx of      CEREBROVASCULAR DISEASE No family hx of      CANCER No family hx of      Hypertension No family hx of      C.A.D. No family hx of      Lupus No family hx of      Thrombophilia No family hx of      Psoriasis No family hx of      Eczema No family hx of       Melanoma No family hx of          Current Outpatient Prescriptions   Medication Sig Dispense Refill     fluconazole (DIFLUCAN) 150 MG tablet Take 1 tablet (150 mg) by mouth every 3 days 4 tablet 0     metroNIDAZOLE (FLAGYL) 500 MG tablet Take 1 tablet (500 mg) by mouth 2 times daily 14 tablet 0     Allergies   Allergen Reactions     Nkda [No Known Drug Allergies]      BP Readings from Last 3 Encounters:   10/06/17 118/77   09/19/17 110/76   07/24/17 103/71    Wt Readings from Last 3 Encounters:   10/06/17 169 lb 3.2 oz (76.7 kg)   09/19/17 167 lb (75.8 kg)   07/24/17 163 lb 9.6 oz (74.2 kg)                  Labs reviewed in EPIC        Reviewed and updated as needed this visit by clinical staff  Tobacco  Allergies  Meds  Med Hx  Surg Hx  Fam Hx  Soc Hx      Reviewed and updated as needed this visit by Provider         ROS:  Constitutional, HEENT, cardiovascular, pulmonary, GI, , musculoskeletal, neuro, skin, endocrine and psych systems are negative, except as otherwise noted.      OBJECTIVE:   /77  Pulse 89  Temp 98.3  F (36.8  C) (Oral)  Wt 169 lb 3.2 oz (76.7 kg)  SpO2 100%  BMI 28.89 kg/m2  Body mass index is 28.89 kg/(m^2).  GENERAL: healthy, alert and no distress  RESP: lungs clear to auscultation - no rales, rhonchi or wheezes  CV: regular rate and rhythm, normal S1 S2, no S3 or S4, no murmur, click or rub, no peripheral edema and peripheral pulses strong  PSYCH: mentation appears normal, affect normal/bright    Diagnostic Test Results:  See orders- discussed normal wet prep    ASSESSMENT/PLAN:         ICD-10-CM    1. Vaginal itching L29.8 Wet prep     fluconazole (DIFLUCAN) 150 MG tablet     metroNIDAZOLE (FLAGYL) 500 MG tablet    wet prep normal, wants to try treatment that worked for her in the past. If symptoms persist, will try vaginal estrogen, then OBGYN referral       See Patient Instructions: If your symptoms persist, let me know and we can try vaginal estrogen.  If symptoms  continue, I will send you to OBGYN for further evaluation.     Lily Galindo, P  [facility]

## 2017-10-11 ENCOUNTER — TELEPHONE (OUTPATIENT)
Dept: FAMILY MEDICINE | Facility: CLINIC | Age: 47
End: 2017-10-11

## 2017-10-11 NOTE — LETTER
[facility]  [address] [facility] [address] [facility] [place] [id number] [phone]          October 12, 2017    RE:  Arcelia Liz                                                                                                                                                       [address] [facility] [facility] [place] [address]            To whom it may concern:    Arcelia Liz is under my professional care, please allow Arcelia de la cruz BENJA from school due to the recent loss of her . If you have any questions or concerns, please let me know and I can try to assist as able.       Sincerely,        Lily Galindo RN, CNP

## 2017-10-11 NOTE — TELEPHONE ENCOUNTER
Patient is needing a note for a BENJA from school due to the loss of her . Patient states this note is due today 10/11/2017. Please contact this patient when the note is completed for her to have BENJA from school.    Thank you.

## 2017-10-16 ENCOUNTER — OFFICE VISIT (OUTPATIENT)
Dept: FAMILY MEDICINE | Facility: CLINIC | Age: 47
End: 2017-10-16
Payer: COMMERCIAL

## 2017-10-16 VITALS
BODY MASS INDEX: 29.52 KG/M2 | WEIGHT: 166.6 LBS | HEIGHT: 63 IN | OXYGEN SATURATION: 99 % | HEART RATE: 98 BPM | TEMPERATURE: 98.1 F | DIASTOLIC BLOOD PRESSURE: 84 MMHG | SYSTOLIC BLOOD PRESSURE: 128 MMHG

## 2017-10-16 DIAGNOSIS — D64.9 ANEMIA, UNSPECIFIED TYPE: ICD-10-CM

## 2017-10-16 DIAGNOSIS — R14.2 FLATULENCE, ERUCTATION AND GAS PAIN: ICD-10-CM

## 2017-10-16 DIAGNOSIS — R14.3 FLATULENCE, ERUCTATION AND GAS PAIN: ICD-10-CM

## 2017-10-16 DIAGNOSIS — R10.13 ABDOMINAL PAIN, EPIGASTRIC: Primary | ICD-10-CM

## 2017-10-16 DIAGNOSIS — R14.1 FLATULENCE, ERUCTATION AND GAS PAIN: ICD-10-CM

## 2017-10-16 LAB
ALBUMIN SERPL-MCNC: 3.8 G/DL (ref 3.4–5)
ALP SERPL-CCNC: 92 U/L (ref 40–150)
ALT SERPL W P-5'-P-CCNC: 29 U/L (ref 0–50)
AMYLASE SERPL-CCNC: 55 U/L (ref 30–110)
AST SERPL W P-5'-P-CCNC: 14 U/L (ref 0–45)
BASOPHILS # BLD AUTO: 0 10E9/L (ref 0–0.2)
BASOPHILS NFR BLD AUTO: 0.7 %
BILIRUB DIRECT SERPL-MCNC: <0.1 MG/DL (ref 0–0.2)
BILIRUB SERPL-MCNC: 0.3 MG/DL (ref 0.2–1.3)
DIFFERENTIAL METHOD BLD: ABNORMAL
EOSINOPHIL # BLD AUTO: 0.1 10E9/L (ref 0–0.7)
EOSINOPHIL NFR BLD AUTO: 1 %
ERYTHROCYTE [DISTWIDTH] IN BLOOD BY AUTOMATED COUNT: 16 % (ref 10–15)
HCT VFR BLD AUTO: 32.9 % (ref 35–47)
HGB BLD-MCNC: 10.4 G/DL (ref 11.7–15.7)
LIPASE SERPL-CCNC: 107 U/L (ref 73–393)
LYMPHOCYTES # BLD AUTO: 1.8 10E9/L (ref 0.8–5.3)
LYMPHOCYTES NFR BLD AUTO: 30 %
MCH RBC QN AUTO: 24.5 PG (ref 26.5–33)
MCHC RBC AUTO-ENTMCNC: 31.6 G/DL (ref 31.5–36.5)
MCV RBC AUTO: 78 FL (ref 78–100)
MONOCYTES # BLD AUTO: 0.6 10E9/L (ref 0–1.3)
MONOCYTES NFR BLD AUTO: 9.4 %
NEUTROPHILS # BLD AUTO: 3.5 10E9/L (ref 1.6–8.3)
NEUTROPHILS NFR BLD AUTO: 58.9 %
PLATELET # BLD AUTO: 368 10E9/L (ref 150–450)
PROT SERPL-MCNC: 7.5 G/DL (ref 6.8–8.8)
RBC # BLD AUTO: 4.24 10E12/L (ref 3.8–5.2)
WBC # BLD AUTO: 6 10E9/L (ref 4–11)

## 2017-10-16 PROCEDURE — 85025 COMPLETE CBC W/AUTO DIFF WBC: CPT | Performed by: NURSE PRACTITIONER

## 2017-10-16 PROCEDURE — 99214 OFFICE O/P EST MOD 30 MIN: CPT | Performed by: NURSE PRACTITIONER

## 2017-10-16 PROCEDURE — 36415 COLL VENOUS BLD VENIPUNCTURE: CPT | Performed by: NURSE PRACTITIONER

## 2017-10-16 PROCEDURE — 82150 ASSAY OF AMYLASE: CPT | Performed by: NURSE PRACTITIONER

## 2017-10-16 PROCEDURE — 80076 HEPATIC FUNCTION PANEL: CPT | Performed by: NURSE PRACTITIONER

## 2017-10-16 PROCEDURE — 83690 ASSAY OF LIPASE: CPT | Performed by: NURSE PRACTITIONER

## 2017-10-16 RX ORDER — ACTIVATED CHARCOAL 260 MG
1 CAPSULE ORAL 3 TIMES DAILY PRN
Qty: 180 CAPSULE | Refills: 0 | Status: SHIPPED | OUTPATIENT
Start: 2017-10-16 | End: 2017-11-14

## 2017-10-16 RX ORDER — PANTOPRAZOLE SODIUM 40 MG/1
40 TABLET, DELAYED RELEASE ORAL DAILY
Qty: 90 TABLET | Refills: 0 | Status: SHIPPED | OUTPATIENT
Start: 2017-10-16 | End: 2017-10-26

## 2017-10-16 NOTE — PATIENT INSTRUCTIONS
Please take medication as prescribed and schedule EGD scope.  Follow up if your symptoms worsen before we get your results back.  Follow up if you have any questions or concerns.       Epigastric Pain (Uncertain Cause)     Epigastric pain can be a sign of disease in the upper abdomen. Common causes include:    Acid reflux (stomach acid flowing up into the esophagus)    Gastritis (irritation of the stomach lining)    Peptic Ulcer Disease    Inflammation of the pancreas    Gallstone    Infection in the gallbladder  Pain may be dull or burning. It may spread upward to the chest or to the back. There may be other symptoms such as belching, bloating, cramps or hunger pains. There may be weight loss or poor appetite, nausea or vomiting.  Since the diagnosis of your pain is not certain yet, further tests may sometimes be needed. Sometimes the doctor will treat you for the most likely condition to see if there is improvement before doing further tests.  Home care  Medicines    Antacids help neutralize the normal acids in your stomach. Examples are Maalox, Mylanta, Rolaids, and Tums. If you don t like the liquid, you can also try a chewable one. You may find one works better than another for you. Overuse can cause diarrhea or constipation.    Acid blockers (H2 blockers) decrease acid production. Examples are cimetidine (Tagamet), famotidine (Pepcid) and ranitidine (Zantac).    Acid inhibitors (PPIs) decrease acid production in a different way than the blockers. You may find they work better, but can take a little longer to take effect.  Examples are omeprazole (Prilosec), lansoprazole (Prevacid), pantoprazole (Protonix), rabeprazole (Aciphex), and esomeprazole (Nexium).    Take an antacid 30-60 minutes after eating and at bedtime, but not at the same time as an acid blocker.    Try not to take NSAIDs. Aspirin may also cause problems, but if taking it for your heart or other medical reasons, talk to your doctor before  stopping it; you do not want to cause a worse problem, like a heart attack or stroke.  Diet    If certain foods seem to cause your spasm, try to avoid them.     Eat slowly and chew food well before swallowing. Symptoms of gastritis can be worsened by certain foods. Limit or avoid fatty, fried, and spicy foods, as well as coffee, chocolate, mint, and foods with high acid content such as tomatoes and citrus fruit and juices (orange, grapefruit, lemon).    Avoid alcohol, caffeine, and tobacco, which can delay healing and worsen your problem.    Try eating smaller meals with snacks in between  Follow-up care  Follow up with your healthcare provider or as advised.  When to seek medical advice  Call your healthcare provider right away if any of the following occur:    Stomach pain worsens or moves to the right lower part of the abdomen    Chest pain appears, or if it worsens or spreads to the chest, back, neck, shoulder, or arm    Frequent vomiting (can t keep down liquids)    Blood in the stool or vomit (red or black color)    Feeling weak or dizzy, fainting, or having trouble breathing    Fever of 100.4 F (38 C) or higher, or as directed by your healthcare provider    Abdominal swelling  Date Last Reviewed: 9/25/2015 2000-2017 [employer]. [address] [place] [address], [place] [address]. All rights reserved. This information is not intended as a substitute for professional medical care. Always follow your healthcare professional's instructions.

## 2017-10-16 NOTE — PROGRESS NOTES
SUBJECTIVE:   Arcelia Liz is a 47 year old female who presents to clinic today for the following health issues:      Gastrointestinal symptoms      Duration: 1 week- same pain she had last summer    Description:           ABDOMINAL PAIN - epigastric area.   Pain is described as dull pain and radiates to left and right upper abd.      Intensity:  moderate    Accompanying signs and symptoms:  gas    History  Previous {similar problem: YES- has had a lot of stress with the passing of her , school, etc.   Previous evaluation:  none    Aggravating factors: meals    Alleviating factors: nothing    Other Therapies tried: None        Problem list and histories reviewed & adjusted, as indicated.  Additional history: as documented    Patient Active Problem List   Diagnosis     CARDIOVASCULAR SCREENING; LDL GOAL LESS THAN 160     Epigastric pain     Mantoux: positive     Inflammatory acne     Comedonal acne     Anemia     Past Surgical History:   Procedure Laterality Date     COLONOSCOPY  9/6/2012    Procedure: COLONOSCOPY;  COLONOSCOPY, CHRONIC LOWER ABD PAIN;  Surgeon: Jordan Mitchell MD;  Location: [facility] OR     DILATION AND CURETTAGE  2005    MAB, twin gestation       Social History   Substance Use Topics     Smoking status: Never Smoker     Smokeless tobacco: Never Used      Comment: Lives in smoke free household     Alcohol use No     Family History   Problem Relation Age of Onset     Asthma Father      DIABETES Father      Glaucoma No family hx of      Macular Degeneration No family hx of      Thyroid Disease No family hx of      CEREBROVASCULAR DISEASE No family hx of      CANCER No family hx of      Hypertension No family hx of      C.A.D. No family hx of      Lupus No family hx of      Thrombophilia No family hx of      Psoriasis No family hx of      Eczema No family hx of      Melanoma No family hx of          Current Outpatient Prescriptions   Medication Sig Dispense Refill     pantoprazole  "(PROTONIX) 40 MG EC tablet Take 1 tablet (40 mg) by mouth daily Take 30-60 minutes before a meal. 90 tablet 0     Charcoal Activated (ACTIVATED VEGETABLE CHARCOAL) 260 MG CAPS Take 1 capsule by mouth 3 times daily as needed For gas/ bloating 180 capsule 0     Allergies   Allergen Reactions     Nkda [No Known Drug Allergies]      BP Readings from Last 3 Encounters:   10/16/17 128/84   10/06/17 118/77   09/19/17 110/76    Wt Readings from Last 3 Encounters:   10/16/17 166 lb 9.6 oz (75.6 kg)   10/06/17 169 lb 3.2 oz (76.7 kg)   09/19/17 167 lb (75.8 kg)                  Labs reviewed in EPIC        Reviewed and updated as needed this visit by clinical staffTobacco  Allergies  Meds  Med Hx  Surg Hx  Fam Hx  Soc Hx      Reviewed and updated as needed this visit by Provider         ROS:  Constitutional, HEENT, cardiovascular, pulmonary, GI, , musculoskeletal, neuro, skin, endocrine and psych systems are negative, except as otherwise noted.      OBJECTIVE:   /84  Pulse 98  Temp 98.1  F (36.7  C) (Oral)  Ht 5' 3.39\" (1.61 m)  Wt 166 lb 9.6 oz (75.6 kg)  SpO2 99%  BMI 29.15 kg/m2  Body mass index is 29.15 kg/(m^2).  GENERAL: healthy, alert and no distress  RESP: lungs clear to auscultation - no rales, rhonchi or wheezes  CV: regular rate and rhythm, normal S1 S2, no S3 or S4, no murmur, click or rub, no peripheral edema and peripheral pulses strong  ABDOMEN: soft, no hepatosplenomegaly, no masses and bowel sounds normal POSITIVE for tenderness with palpation to epigastric region, umbilicus; no rebound tenderness  PSYCH: mentation appears normal, affect normal/bright    Diagnostic Test Results:  See orders    ASSESSMENT/PLAN:         ICD-10-CM    1. Abdominal pain, epigastric R10.13 pantoprazole (PROTONIX) 40 MG EC tablet     GASTROENTEROLOGY ADULT REF PROCEDURE ONLY     Hepatic panel (Albumin, ALT, AST, Bili, Alk Phos, TP)     Amylase     Lipase     CBC with platelets and differential     H Pylori " antigen, stool   2. Flatulence, eructation and gas pain R14.3 Charcoal Activated (ACTIVATED VEGETABLE CHARCOAL) 260 MG CAPS    R14.1 H Pylori antigen, stool    R14.2        See Patient Instructions: Please take medication as prescribed and schedule EGD scope.  Follow up if your symptoms worsen before we get your results back.  Follow up if you have any questions or concerns.     Lily Galindo, VINAYAK  [facility]

## 2017-10-16 NOTE — MR AVS SNAPSHOT
After Visit Summary   10/16/2017    Arcelia Liz    MRN: [id number]           Patient Information     Date Of Birth          1970        Visit Information        Provider Department      10/16/2017 11:20 AM Lily Galindo NP [facility] ([facility])        Today's Diagnoses     Abdominal pain, epigastric    -  1    Flatulence, eructation and gas pain          Care Instructions    Please take medication as prescribed and schedule EGD scope.  Follow up if your symptoms worsen before we get your results back.  Follow up if you have any questions or concerns.       Epigastric Pain (Uncertain Cause)     Epigastric pain can be a sign of disease in the upper abdomen. Common causes include:    Acid reflux (stomach acid flowing up into the esophagus)    Gastritis (irritation of the stomach lining)    Peptic Ulcer Disease    Inflammation of the pancreas    Gallstone    Infection in the gallbladder  Pain may be dull or burning. It may spread upward to the chest or to the back. There may be other symptoms such as belching, bloating, cramps or hunger pains. There may be weight loss or poor appetite, nausea or vomiting.  Since the diagnosis of your pain is not certain yet, further tests may sometimes be needed. Sometimes the doctor will treat you for the most likely condition to see if there is improvement before doing further tests.  Home care  Medicines    Antacids help neutralize the normal acids in your stomach. Examples are Maalox, Mylanta, Rolaids, and Tums. If you don t like the liquid, you can also try a chewable one. You may find one works better than another for you. Overuse can cause diarrhea or constipation.    Acid blockers (H2 blockers) decrease acid production. Examples are cimetidine (Tagamet), famotidine (Pepcid) and ranitidine (Zantac).    Acid inhibitors (PPIs) decrease acid production in a different way than the blockers. You may find they work better, but can take a little longer to take  effect.  Examples are omeprazole (Prilosec), lansoprazole (Prevacid), pantoprazole (Protonix), rabeprazole (Aciphex), and esomeprazole (Nexium).    Take an antacid 30-60 minutes after eating and at bedtime, but not at the same time as an acid blocker.    Try not to take NSAIDs. Aspirin may also cause problems, but if taking it for your heart or other medical reasons, talk to your doctor before stopping it; you do not want to cause a worse problem, like a heart attack or stroke.  Diet    If certain foods seem to cause your spasm, try to avoid them.     Eat slowly and chew food well before swallowing. Symptoms of gastritis can be worsened by certain foods. Limit or avoid fatty, fried, and spicy foods, as well as coffee, chocolate, mint, and foods with high acid content such as tomatoes and citrus fruit and juices (orange, grapefruit, lemon).    Avoid alcohol, caffeine, and tobacco, which can delay healing and worsen your problem.    Try eating smaller meals with snacks in between  Follow-up care  Follow up with your healthcare provider or as advised.  When to seek medical advice  Call your healthcare provider right away if any of the following occur:    Stomach pain worsens or moves to the right lower part of the abdomen    Chest pain appears, or if it worsens or spreads to the chest, back, neck, shoulder, or arm    Frequent vomiting (can t keep down liquids)    Blood in the stool or vomit (red or black color)    Feeling weak or dizzy, fainting, or having trouble breathing    Fever of 100.4 F (38 C) or higher, or as directed by your healthcare provider    Abdominal swelling  Date Last Reviewed: 9/25/2015 2000-2017 [employer]. [address] [place] [address], [place] [address]. All rights reserved. This information is not intended as a substitute for professional medical care. Always follow your healthcare professional's instructions.                Follow-ups after your visit        Additional Services      GASTROENTEROLOGY ADULT REF PROCEDURE ONLY       Last Lab Result: Creatinine (mg/dL)       Date                     Value                 01/25/2016               0.7              ----------  Body mass index is 29.15 kg/(m^2).     Needed:  No  Language:  English    Patient will be contacted to schedule procedure.     Please be aware that coverage of these services is subject to the terms and limitations of your health insurance plan.  Call member services at your health plan with any benefit or coverage questions.  Any procedures must be performed at a [facility] facility OR coordinated by your clinic's referral office.    Please bring the following with you to your appointment:    (1) Any X-Rays, CTs or MRIs which have been performed.  Contact the facility where they were done to arrange for  prior to your scheduled appointment.    (2) List of current medications   (3) This referral request   (4) Any documents/labs given to you for this referral                  Follow-up notes from your care team     Return if symptoms worsen or fail to improve.      Future tests that were ordered for you today     Open Future Orders        Priority Expected Expires Ordered    H Pylori antigen, stool Routine  11/15/2017 10/16/2017            Who to contact     Normal or non-critical lab and imaging results will be communicated to you by MyChart, letter or phone within 4 business days after the clinic has received the results. If you do not hear from us within 7 days, please contact the clinic through MyChart or phone. If you have a critical or abnormal lab result, we will notify you by phone as soon as possible.  Submit refill requests through Invaluable or call your pharmacy and they will forward the refill request to us. Please allow 3 business days for your refill to be completed.          If you need to speak with a  for additional information , please call: [phone]             Additional  "Information About Your Visit        MyChart Information     [employer] gives you secure access to your electronic health record. If you see a primary care provider, you can also send messages to your care team and make appointments. If you have questions, please call your primary care clinic.  If you do not have a primary care provider, please call [phone] and they will assist you.        Care EveryWhere ID     This is your Care EveryWhere ID. This could be used by other organizations to access your [place] medical records  [id number]        Your Vitals Were     Pulse Temperature Height Pulse Oximetry BMI (Body Mass Index)       98 98.1  F (36.7  C) (Oral) 5' 3.39\" (1.61 m) 99% 29.15 kg/m2        Blood Pressure from Last 3 Encounters:   10/16/17 128/84   10/06/17 118/77   09/19/17 110/76    Weight from Last 3 Encounters:   10/16/17 166 lb 9.6 oz (75.6 kg)   10/06/17 169 lb 3.2 oz (76.7 kg)   09/19/17 167 lb (75.8 kg)              We Performed the Following     Amylase     CBC with platelets and differential     GASTROENTEROLOGY ADULT REF PROCEDURE ONLY     Hepatic panel (Albumin, ALT, AST, Bili, Alk Phos, TP)     Lipase          Today's Medication Changes          These changes are accurate as of: 10/16/17 11:44 AM.  If you have any questions, ask your nurse or doctor.               Start taking these medicines.        Dose/Directions    Activated Vegetable Charcoal 260 MG Caps   Used for:  Flatulence, eructation and gas pain   Started by:  Lily Galindo NP        Dose:  1 capsule   Take 1 capsule by mouth 3 times daily as needed For gas/ bloating   Quantity:  180 capsule   Refills:  0       pantoprazole 40 MG EC tablet   Commonly known as:  PROTONIX   Used for:  Abdominal pain, epigastric   Started by:  Lily Galindo NP        Dose:  40 mg   Take 1 tablet (40 mg) by mouth daily Take 30-60 minutes before a meal.   Quantity:  90 tablet   Refills:  0            Where to get your medicines      These " medications were sent to [facility] [place], [place] - [address] [facility]  [address] [facility] [place] [address]     Phone:  [phone]     Activated Vegetable Charcoal 260 MG Caps    pantoprazole 40 MG EC tablet                Primary Care Provider Office Phone # Fax #    Marianolan Baldwin PA-C [phone] [address]  [place] [address]        Equal Access to Services     [facility] : Hadii aad ku hadasho Soomaali, waaxda luqadaha, qaybta kaalmada adeegyada, waxay idiin hayaan adeeg kharash la'aan . So [facility] [phone].    ATENCIÓN: Si habla español, tiene a adams disposición servicios gratuitos de asistencia lingüística. [facility] [phone].    We comply with applicable federal civil rights laws and [place] laws. We do not discriminate on the basis of race, color, national origin, age, disability, sex, sexual orientation, or gender identity.            Thank you!     Thank you for choosing [facility]  for your care. Our goal is always to provide you with excellent care. Hearing back from our patients is one way we can continue to improve our services. Please take a few minutes to complete the written survey that you may receive in the mail after your visit with us. Thank you!             Your Updated Medication List - Protect others around you: Learn how to safely use, store and throw away your medicines at www.disposemymeds.org.          This list is accurate as of: 10/16/17 11:44 AM.  Always use your most recent med list.                   Brand Name Dispense Instructions for use Diagnosis    Activated Vegetable Charcoal 260 MG Caps     180 capsule    Take 1 capsule by mouth 3 times daily as needed For gas/ bloating    Flatulence, eructation and gas pain       pantoprazole 40 MG EC tablet    PROTONIX    90 tablet    Take 1 tablet (40 mg) by mouth daily Take 30-60 minutes before a meal.    Abdominal pain, epigastric

## 2017-10-16 NOTE — NURSING NOTE
"Chief Complaint   Patient presents with     Gastrointestinal Problem       Initial /84  Pulse 98  Temp 98.1  F (36.7  C) (Oral)  Ht 5' 3.39\" (1.61 m)  Wt 166 lb 9.6 oz (75.6 kg)  SpO2 99%  BMI 29.15 kg/m2 Estimated body mass index is 29.15 kg/(m^2) as calculated from the following:    Height as of this encounter: 5' 3.39\" (1.61 m).    Weight as of this encounter: 166 lb 9.6 oz (75.6 kg).  Medication Reconciliation: complete     Monika Winters MA  "

## 2017-10-19 ENCOUNTER — TRANSFERRED RECORDS (OUTPATIENT)
Dept: HEALTH INFORMATION MANAGEMENT | Facility: CLINIC | Age: 47
End: 2017-10-19

## 2017-10-23 DIAGNOSIS — R14.2 FLATULENCE, ERUCTATION AND GAS PAIN: ICD-10-CM

## 2017-10-23 DIAGNOSIS — R14.3 FLATULENCE, ERUCTATION AND GAS PAIN: ICD-10-CM

## 2017-10-23 DIAGNOSIS — R10.13 ABDOMINAL PAIN, EPIGASTRIC: ICD-10-CM

## 2017-10-23 DIAGNOSIS — R14.1 FLATULENCE, ERUCTATION AND GAS PAIN: ICD-10-CM

## 2017-10-23 PROCEDURE — 87338 HPYLORI STOOL AG IA: CPT | Performed by: NURSE PRACTITIONER

## 2017-10-23 NOTE — PROGRESS NOTES
Terrell Paniagua,    Thank you for your recent office visit.    Here are your recent results.  Your labs show anemia again, I have sent in an iron supplement for you, please take one capsule daily. Follow up as needed.     Feel free to contact me via CMGE or call the clinic at [phone].    Sincerely,    Lily Galindo, NAKUL, FNP-BC

## 2017-10-24 LAB
H PYLORI AG STL QL IA: ABNORMAL
SPECIMEN SOURCE: ABNORMAL

## 2017-10-26 DIAGNOSIS — A04.8 H. PYLORI INFECTION: ICD-10-CM

## 2017-10-26 DIAGNOSIS — A04.8 H. PYLORI INFECTION: Primary | ICD-10-CM

## 2017-10-26 RX ORDER — PANTOPRAZOLE SODIUM 40 MG/1
40 TABLET, DELAYED RELEASE ORAL
Qty: 28 TABLET | Refills: 0 | Status: SHIPPED | OUTPATIENT
Start: 2017-10-26 | End: 2017-11-09

## 2017-10-26 RX ORDER — CLARITHROMYCIN 500 MG
500 TABLET ORAL 2 TIMES DAILY
Qty: 28 TABLET | Refills: 0 | Status: SHIPPED | OUTPATIENT
Start: 2017-10-26 | End: 2017-11-09

## 2017-10-26 RX ORDER — AMOXICILLIN 500 MG/1
1000 CAPSULE ORAL 2 TIMES DAILY
Qty: 56 CAPSULE | Refills: 0 | Status: SHIPPED | OUTPATIENT
Start: 2017-10-26 | End: 2017-11-09

## 2017-10-26 NOTE — PROGRESS NOTES
Terrell Paniagua,    Thank you for your recent office visit.    Here are your recent results.  Your labs are positive for h. Pylori, so I am sending you in 3 medications that you will need to take twice a day for 14 days.  Follow up if you have any questions/ concerns.     Feel free to contact me via [employer] or call the clinic at [phone].    Sincerely,    Lily Galindo, APRN, FNP-BC    Understanding H. pylori and Ulcers    An ulcer can form in two areas of the digestive tract; the stomach and the duodenum (where the stomach meets the small intestine).    Traditionally, ulcers, or sores in the lining of your digestive tract, were thought to be caused by too much spicy food, stress, or an anxious personality. We now know that most ulcers are probably due to infection with bacteria known as Helicobacter pylori (H. pylori).  Common ulcer symptoms  These include the following:    Burning, cramping, or hungerlike pain in the stomach area, often 1 to 3 hours after a meal or in the middle of the night    Pain that gets better or worse with eating    Nausea or vomiting    Black, tarry, or bloody stools (which means the ulcer is bleeding)  Or you may have no symptoms.  Your evaluation  An evaluation by your healthcare provider can show if you have an ulcer and determine whether it was caused by H. pylori. Your healthcare provider may ask you questions, examine you, and possibly do some tests. These may include:    A special X-ray called an upper gastrointestinal series, to help locate an ulcer. Before the test, you drink a chalky liquid, called barium. This liquid helps the ulcer show up on the X-ray.    An endoscopic exam, done with a long tube with a camera on the end. The tube is passed through your mouth into your stomach, and allows the healthcare provider to get a closer look at your ulcer. You will be lightly sedated for this procedure. Your healthcare provider can also take a tissue sample to test for H.  pylori.    Blood, stool, and breath tests are also available to show whether you have H. pylori in your digestive tract.  Your treatment  To kill H. pylori so your ulcer can heal, your healthcare provider will probably prescribe antibiotics. Other ulcer medicines that help reduce stomach acid may also be prescribed as well. Testing after treatment may be recommended to be sure the H. pylori infection is gone. Usually, killing H. pylori helps keep the ulcer from returning. However, you can develop ulcers more than once in your lifetime.   Date Last Reviewed: 7/1/2016 2000-2017 [employer]. [address] [place] [address]. All rights reserved. This information is not intended as a substitute for professional medical care. Always follow your healthcare professional's instructions.

## 2017-11-14 ENCOUNTER — OFFICE VISIT (OUTPATIENT)
Dept: FAMILY MEDICINE | Facility: CLINIC | Age: 47
End: 2017-11-14
Payer: COMMERCIAL

## 2017-11-14 VITALS
WEIGHT: 168.2 LBS | BODY MASS INDEX: 29.43 KG/M2 | SYSTOLIC BLOOD PRESSURE: 124 MMHG | DIASTOLIC BLOOD PRESSURE: 84 MMHG | TEMPERATURE: 98.1 F | OXYGEN SATURATION: 100 % | HEART RATE: 93 BPM

## 2017-11-14 DIAGNOSIS — Z86.19 HISTORY OF HELICOBACTER PYLORI INFECTION: ICD-10-CM

## 2017-11-14 DIAGNOSIS — R14.3 FLATULENCE, ERUCTATION AND GAS PAIN: ICD-10-CM

## 2017-11-14 DIAGNOSIS — K21.00 GASTROESOPHAGEAL REFLUX DISEASE WITH ESOPHAGITIS: Primary | ICD-10-CM

## 2017-11-14 DIAGNOSIS — R14.1 FLATULENCE, ERUCTATION AND GAS PAIN: ICD-10-CM

## 2017-11-14 DIAGNOSIS — R14.2 FLATULENCE, ERUCTATION AND GAS PAIN: ICD-10-CM

## 2017-11-14 DIAGNOSIS — F43.21 ADJUSTMENT DISORDER WITH DEPRESSED MOOD: ICD-10-CM

## 2017-11-14 DIAGNOSIS — R03.0 ELEVATED BLOOD PRESSURE READING WITHOUT DIAGNOSIS OF HYPERTENSION: ICD-10-CM

## 2017-11-14 DIAGNOSIS — H60.391 INFECTIVE OTITIS EXTERNA, RIGHT: ICD-10-CM

## 2017-11-14 DIAGNOSIS — L91.8 SKIN TAG: ICD-10-CM

## 2017-11-14 PROCEDURE — 99214 OFFICE O/P EST MOD 30 MIN: CPT | Mod: 25 | Performed by: NURSE PRACTITIONER

## 2017-11-14 PROCEDURE — 11200 RMVL SKIN TAGS UP TO&INC 15: CPT | Performed by: NURSE PRACTITIONER

## 2017-11-14 RX ORDER — SIMETHICONE 180 MG
1 CAPSULE ORAL DAILY PRN
Qty: 60 CAPSULE | Refills: 0 | Status: SHIPPED | OUTPATIENT
Start: 2017-11-14 | End: 2018-02-14

## 2017-11-14 RX ORDER — OMEPRAZOLE 40 MG/1
40 CAPSULE, DELAYED RELEASE ORAL DAILY
Qty: 90 CAPSULE | Refills: 1 | Status: SHIPPED | OUTPATIENT
Start: 2017-11-14 | End: 2017-11-28

## 2017-11-14 RX ORDER — OFLOXACIN 3 MG/ML
10 SOLUTION AURICULAR (OTIC) 2 TIMES DAILY
Qty: 10 ML | Refills: 0 | Status: SHIPPED | OUTPATIENT
Start: 2017-11-14 | End: 2017-11-21

## 2017-11-14 NOTE — NURSING NOTE
"Chief Complaint   Patient presents with     RECHECK     stomach ache       Initial BP (!) 142/91  Pulse 93  Temp 98.1  F (36.7  C) (Oral)  Wt 168 lb 3.2 oz (76.3 kg)  LMP 11/08/2017 (Approximate)  SpO2 100%  Breastfeeding? No  BMI 29.43 kg/m2 Estimated body mass index is 29.43 kg/(m^2) as calculated from the following:    Height as of 10/16/17: 5' 3.39\" (1.61 m).    Weight as of this encounter: 168 lb 3.2 oz (76.3 kg).  Medication Reconciliation: complete     Monika Winters MA  "

## 2017-11-14 NOTE — MR AVS SNAPSHOT
After Visit Summary   11/14/2017    Arcelia Liz    MRN: [id number]           Patient Information     Date Of Birth          1970        Visit Information        Provider Department      11/14/2017 9:20 AM Lily Galindo NP [facility]        Today's Diagnoses     Gastroesophageal reflux disease with esophagitis    -  1    Infective otitis externa, right        Adjustment disorder with depressed mood        Flatulence, eructation and gas pain        History of Helicobacter pylori infection          Care Instructions      Tips to Control Acid Reflux    To control acid reflux, you ll need to make some basic diet and lifestyle changes. The simple steps outlined below may be all you ll need to ease discomfort.  Watch what you eat    Avoid fatty foods and spicy foods.    Eat fewer acidic foods, such as citrus and tomato-based foods. These can increase symptoms.    Limit drinking alcohol, caffeine, and fizzy beverages. All increase acid reflux.    Try limiting chocolate, peppermint, and spearmint. These can worsen acid reflux in some people.  Watch when you eat    Avoid lying down for 3 hours after eating.    Do not snack before going to bed.  Raise your head  Raising your head and upper body by 4 to 6 inches helps limit reflux when you re lying down. Put blocks under the head of your bed frame to raise it.  Other changes    Lose weight, if you need to    Don t exercise near bedtime    Avoid tight-fitting clothes    Limit aspirin and ibuprofen    Stop smoking   Date Last Reviewed: 7/1/2016 2000-2017 [employer]. [address] [place] [address], [place] [address]. All rights reserved. This information is not intended as a substitute for professional medical care. Always follow your healthcare professional's instructions.        Excess Gas  Certain foods produce gas when digested. In some people, these foods make an excessive amount of gas. This may cause bloating, burping, or  increased gas passing through the rectum (flatulence).     Foods that cause gas  The following foods are more likely to cause this problem. Limit them, or remove them from your diet:    Broccoli    Cauliflower    [place] sprouts    Cabbage    Cooked dried beans    Fizzy (carbonated) drinks, such as sparkling water, soda, beer, and champagne  Other causes  Other causes of excess gas include:    Eating too fast or talking while you chew. This may cause you to swallow air. This increases the amount of gas in your stomach. And it may make your symptoms worse. Chew each mouthful completely before you swallow. Take your time.    Chewing on gum or sucking on hard candy. These cause you to swallow more often. And some of what you are swallowing is air. This leads to more gas in your stomach. Avoid chewing gum and hard candy.    Overeating. This may increase the feeling of being bloated and cause more gas. When you are full, stop eating.     Being constipated. This can increase the amount of normal intestinal gas. Avoid constipation by getting more fiber in your diet. Good sources of fiber include whole-grain cereal, fresh vegetables (except those in the above list), and fresh fruits. High-fiber foods absorb water and carry it out of the body. When adding more fiber to your diet, you also need to drink more water. You should drink at least 8, 8-ounce glasses of water (2 quarts) per day.  Date Last Reviewed: 8/1/2016 2000-2017 [employer]. [address] [place] [address]. All rights reserved. This information is not intended as a substitute for professional medical care. Always follow your healthcare professional's instructions.                Follow-ups after your visit        Follow-up notes from your care team     Return if symptoms worsen or fail to improve.      Future tests that were ordered for you today     Open Future Orders        Priority Expected Expires Ordered    H Pylori antigen,  stool Routine  12/14/2017 11/14/2017            Who to contact     Normal or non-critical lab and imaging results will be communicated to you by [employer], letter or phone within 4 business days after the clinic has received the results. If you do not hear from us within 7 days, please contact the clinic through [employer] or phone. If you have a critical or abnormal lab result, we will notify you by phone as soon as possible.  Submit refill requests through [employer]visors or call your pharmacy and they will forward the refill request to us. Please allow 3 business days for your refill to be completed.          If you need to speak with a  for additional information , please call: [phone]             Additional Information About Your Visit        Evisors Information     [employer] gives you secure access to your electronic health record. If you see a primary care provider, you can also send messages to your care team and make appointments. If you have questions, please call your primary care clinic.  If you do not have a primary care provider, please call [phone] and they will assist you.        Care EveryWhere ID     This is your Care EveryWhere ID. This could be used by other organizations to access your [place] medical records  [id number]        Your Vitals Were     Pulse Temperature Last Period Pulse Oximetry Breastfeeding? BMI (Body Mass Index)    93 98.1  F (36.7  C) (Oral) 11/08/2017 (Approximate) 100% No 29.43 kg/m2       Blood Pressure from Last 3 Encounters:   11/14/17 (!) 142/91   10/16/17 128/84   10/06/17 118/77    Weight from Last 3 Encounters:   11/14/17 168 lb 3.2 oz (76.3 kg)   10/16/17 166 lb 9.6 oz (75.6 kg)   10/06/17 169 lb 3.2 oz (76.7 kg)                 Today's Medication Changes          These changes are accurate as of: 11/14/17  9:45 AM.  If you have any questions, ask your nurse or doctor.               Start taking these medicines.        Dose/Directions    ofloxacin 0.3 %  otic solution   Commonly known as:  FLOXIN   Used for:  Infective otitis externa, right   Started by:  Lily Galindo NP        Dose:  10 drop   Place 10 drops into the right ear 2 times daily for 7 days   Quantity:  10 mL   Refills:  0       omeprazole 40 MG capsule   Commonly known as:  priLOSEC   Used for:  Gastroesophageal reflux disease with esophagitis   Started by:  Lily Galindo NP        Dose:  40 mg   Take 1 capsule (40 mg) by mouth daily Take 30-60 minutes before a meal.   Quantity:  90 capsule   Refills:  1       sertraline 50 MG tablet   Commonly known as:  ZOLOFT   Used for:  Adjustment disorder with depressed mood   Started by:  Lily Galindo NP        Take 1/2 tablet (25 mg) for 1-2 weeks, then increase to 1 tablet orally daily   Quantity:  30 tablet   Refills:  1       Simethicone 180 MG Caps   Used for:  Flatulence, eructation and gas pain   Started by:  Lily Galindo NP        Dose:  1 capsule   Take 1 capsule by mouth daily as needed   Quantity:  60 capsule   Refills:  0            Where to get your medicines      These medications were sent to [facility] [place] - [address] [facility]  [id number] [place], [place] [address] [address]     Phone:  [phone]     ofloxacin 0.3 % otic solution    omeprazole 40 MG capsule    sertraline 50 MG tablet    Simethicone 180 MG Caps                Primary Care Provider Office Phone # Fax #    Maria Baldwin PA-C [phone] [address] [facility]  [place] [address]        Equal Access to Services     [facility] AH: Hadii aad ku hadasho Soomaali, waaxda luqadaha, qaybta kaalmada adeegyada, waxay idiin hayrosan rhona farris. So [facility] [phone].    ATENCIÓN: Si habla español, tiene a adams disposición servicios gratuitos de asistencia lingüística. Llame al [phone].    We comply with applicable federal civil rights laws and [place] laws. We do not discriminate on the basis of race, color, national  origin, age, disability, sex, sexual orientation, or gender identity.            Thank you!     Thank you for choosing [facility]  for your care. Our goal is always to provide you with excellent care. Hearing back from our patients is one way we can continue to improve our services. Please take a few minutes to complete the written survey that you may receive in the mail after your visit with us. Thank you!             Your Updated Medication List - Protect others around you: Learn how to safely use, store and throw away your medicines at www.disposemymeds.org.          This list is accurate as of: 11/14/17  9:45 AM.  Always use your most recent med list.                   Brand Name Dispense Instructions for use Diagnosis    ferrous sulfate 142 (45 FE) MG Tbcr    SLO-FE    90 tablet    Take 1 tablet (142 mg) by mouth daily    Anemia, unspecified type       ofloxacin 0.3 % otic solution    FLOXIN    10 mL    Place 10 drops into the right ear 2 times daily for 7 days    Infective otitis externa, right       omeprazole 40 MG capsule    priLOSEC    90 capsule    Take 1 capsule (40 mg) by mouth daily Take 30-60 minutes before a meal.    Gastroesophageal reflux disease with esophagitis       sertraline 50 MG tablet    ZOLOFT    30 tablet    Take 1/2 tablet (25 mg) for 1-2 weeks, then increase to 1 tablet orally daily    Adjustment disorder with depressed mood       Simethicone 180 MG Caps     60 capsule    Take 1 capsule by mouth daily as needed    Flatulence, eructation and gas pain

## 2017-11-14 NOTE — PATIENT INSTRUCTIONS
Tips to Control Acid Reflux    To control acid reflux, you ll need to make some basic diet and lifestyle changes. The simple steps outlined below may be all you ll need to ease discomfort.  Watch what you eat    Avoid fatty foods and spicy foods.    Eat fewer acidic foods, such as citrus and tomato-based foods. These can increase symptoms.    Limit drinking alcohol, caffeine, and fizzy beverages. All increase acid reflux.    Try limiting chocolate, peppermint, and spearmint. These can worsen acid reflux in some people.  Watch when you eat    Avoid lying down for 3 hours after eating.    Do not snack before going to bed.  Raise your head  Raising your head and upper body by 4 to 6 inches helps limit reflux when you re lying down. Put blocks under the head of your bed frame to raise it.  Other changes    Lose weight, if you need to    Don t exercise near bedtime    Avoid tight-fitting clothes    Limit aspirin and ibuprofen    Stop smoking   Date Last Reviewed: 7/1/2016 2000-2017 [employer]. [address] [place] [address], [place] [address]. All rights reserved. This information is not intended as a substitute for professional medical care. Always follow your healthcare professional's instructions.        Excess Gas  Certain foods produce gas when digested. In some people, these foods make an excessive amount of gas. This may cause bloating, burping, or increased gas passing through the rectum (flatulence).     Foods that cause gas  The following foods are more likely to cause this problem. Limit them, or remove them from your diet:    Broccoli    Cauliflower    [place] sprouts    Cabbage    Cooked dried beans    Fizzy (carbonated) drinks, such as sparkling water, soda, beer, and champagne  Other causes  Other causes of excess gas include:    Eating too fast or talking while you chew. This may cause you to swallow air. This increases the amount of gas in your stomach. And it may make your symptoms worse.  Chew each mouthful completely before you swallow. Take your time.    Chewing on gum or sucking on hard candy. These cause you to swallow more often. And some of what you are swallowing is air. This leads to more gas in your stomach. Avoid chewing gum and hard candy.    Overeating. This may increase the feeling of being bloated and cause more gas. When you are full, stop eating.     Being constipated. This can increase the amount of normal intestinal gas. Avoid constipation by getting more fiber in your diet. Good sources of fiber include whole-grain cereal, fresh vegetables (except those in the above list), and fresh fruits. High-fiber foods absorb water and carry it out of the body. When adding more fiber to your diet, you also need to drink more water. You should drink at least 8, 8-ounce glasses of water (2 quarts) per day.  Date Last Reviewed: 8/1/2016 2000-2017 [employer]. [address] [place] [address], [place] [address]. All rights reserved. This information is not intended as a substitute for professional medical care. Always follow your healthcare professional's instructions.

## 2017-11-14 NOTE — PROGRESS NOTES
SUBJECTIVE:   Arcelia Liz is a 47 year old female who presents to clinic today for the following health issues:      Following up on: stomach ache      Last visit this was discussed: 10/16/17 with [facility]    Progression of Symptoms:  improving    Accompanying Signs & Symptoms: abd pain-epigastric, heart burn causing sore throat. Stress- recent passing of spouse, gas pains.     Taking Medication as prescribed: yes    Side Effects:  None    Medication Helping Symptoms:  yes     ADDITIONAL CONCERNS TO ADDRESS:  1) situational depression  2) removal of skin tags  3) acid reflux with sore throat  4) epigastric tenderness- would like repeat test of h pylori  5) ear pain  6) gas pains    Problem list and histories reviewed & adjusted, as indicated.  Additional history: as documented    Patient Active Problem List   Diagnosis     CARDIOVASCULAR SCREENING; LDL GOAL LESS THAN 160     Epigastric pain     Mantoux: positive     Inflammatory acne     Comedonal acne     Anemia     Past Surgical History:   Procedure Laterality Date     COLONOSCOPY  9/6/2012    Procedure: COLONOSCOPY;  COLONOSCOPY, CHRONIC LOWER ABD PAIN;  Surgeon: Jordan Mitchell MD;  Location: [facility] OR     DILATION AND CURETTAGE  2005    MAB, twin gestation       Social History   Substance Use Topics     Smoking status: Never Smoker     Smokeless tobacco: Never Used      Comment: Lives in smoke free household     Alcohol use No     Family History   Problem Relation Age of Onset     Asthma Father      DIABETES Father      Glaucoma No family hx of      Macular Degeneration No family hx of      Thyroid Disease No family hx of      CEREBROVASCULAR DISEASE No family hx of      CANCER No family hx of      Hypertension No family hx of      C.A.D. No family hx of      Lupus No family hx of      Thrombophilia No family hx of      Psoriasis No family hx of      Eczema No family hx of      Melanoma No family hx of          Current Outpatient Prescriptions   Medication  Sig Dispense Refill     omeprazole (PRILOSEC) 40 MG capsule Take 1 capsule (40 mg) by mouth daily Take 30-60 minutes before a meal. 90 capsule 1     ofloxacin (FLOXIN) 0.3 % otic solution Place 10 drops into the right ear 2 times daily for 7 days 10 mL 0     sertraline (ZOLOFT) 50 MG tablet Take 1/2 tablet (25 mg) for 1-2 weeks, then increase to 1 tablet orally daily 30 tablet 1     Simethicone 180 MG CAPS Take 1 capsule by mouth daily as needed 60 capsule 0     ferrous sulfate (SLO-FE) 142 (45 FE) MG TBCR Take 1 tablet (142 mg) by mouth daily 90 tablet 1     Allergies   Allergen Reactions     Nkda [No Known Drug Allergies]          Reviewed and updated as needed this visit by clinical staffTobacco  Allergies  Meds  Med Hx  Surg Hx  Fam Hx  Soc Hx      Reviewed and updated as needed this visit by Provider         ROS:  Constitutional, HEENT, cardiovascular, pulmonary, GI, , musculoskeletal, neuro, skin, endocrine and psych systems are negative, except as otherwise noted.      OBJECTIVE:   BP (!) 142/91  Pulse 93  Temp 98.1  F (36.7  C) (Oral)  Wt 168 lb 3.2 oz (76.3 kg)  LMP 2017 (Approximate)  SpO2 100%  Breastfeeding? No  BMI 29.43 kg/m2  Body mass index is 29.43 kg/(m^2).  GENERAL: healthy, alert and no distress  ENT: POSITIVE for R ear canal edematous, erythematous  RESP: lungs clear to auscultation - no rales, rhonchi or wheezes  CV: regular rate and rhythm, normal S1 S2, no S3 or S4, no murmur, click or rub, no peripheral edema and peripheral pulses strong  ABDOMEN: soft, no hepatosplenomegaly, no masses and bowel sounds normal POSITIVE epigastric pain with palpation, no rebound tenderness  SKIN: no suspicious rashes POSITIVE for 2 small skin tags to face- growing in size and becoming bothersome with glasses.  One on nose, on by left ear.   PSYCH: mentation appears normal, affect normal/bright    Diagnostic Test Results:  See orders      SKIN TA Small brown/skin colored excrescences  attached by short broad to narrow stalk(s) consistent with benign skin tags.  Cleaned with alcohol, removed with dermablade without issues. Bandaid applied.     ASSESSMENT/PLAN:         ICD-10-CM    1. Gastroesophageal reflux disease with esophagitis K21.0 omeprazole (PRILOSEC) 40 MG capsule   2. Infective otitis externa, right H60.391 ofloxacin (FLOXIN) 0.3 % otic solution   3. Adjustment disorder with depressed mood F43.21 sertraline (ZOLOFT) 50 MG tablet     recently passed away   4. Flatulence, eructation and gas pain R14.3 Simethicone 180 MG CAPS    R14.1     R14.2    5. History of Helicobacter pylori infection Z86.19 H Pylori antigen, stool   6. Elevated blood pressure reading without diagnosis of hypertension R03.0    7. Skin tag L91.8 REMOVAL OF SKIN TAGS, FIRST 15       See Patient Instructions: discussed follow up in one month, sooner if needed.     VINAYAK Perera  [facility]

## 2017-11-17 DIAGNOSIS — Z86.19 HISTORY OF HELICOBACTER PYLORI INFECTION: ICD-10-CM

## 2017-11-17 PROCEDURE — 87338 HPYLORI STOOL AG IA: CPT | Performed by: NURSE PRACTITIONER

## 2017-11-20 ENCOUNTER — TELEPHONE (OUTPATIENT)
Dept: FAMILY MEDICINE | Facility: CLINIC | Age: 47
End: 2017-11-20

## 2017-11-20 DIAGNOSIS — R10.84 ABDOMINAL PAIN, GENERALIZED: Primary | ICD-10-CM

## 2017-11-20 LAB
H PYLORI AG STL QL IA: NORMAL
SPECIMEN SOURCE: NORMAL

## 2017-11-20 NOTE — PROGRESS NOTES
Terrell Paniagua,    Thank you for your recent office visit.    Here are your recent results.  Negative for h pylori infection.     Feel free to contact me via [employer] or call the clinic at [phone].    Sincerely,    NAKUL Joseph, FNP-BC

## 2017-11-20 NOTE — TELEPHONE ENCOUNTER
Patient states she is still having the abdominal pain that she was evaluated for last week by Lily Galindo.  Patient requesting a CT scan.  Will send to provider to advise

## 2017-11-21 ENCOUNTER — RADIANT APPOINTMENT (OUTPATIENT)
Dept: CT IMAGING | Facility: CLINIC | Age: 47
End: 2017-11-21
Attending: NURSE PRACTITIONER
Payer: COMMERCIAL

## 2017-11-21 DIAGNOSIS — R10.84 ABDOMINAL PAIN, GENERALIZED: ICD-10-CM

## 2017-11-21 PROCEDURE — 74177 CT ABD & PELVIS W/CONTRAST: CPT | Mod: TC

## 2017-11-21 RX ORDER — IOPAMIDOL 755 MG/ML
82 INJECTION, SOLUTION INTRAVASCULAR ONCE
Status: COMPLETED | OUTPATIENT
Start: 2017-11-21 | End: 2017-11-21

## 2017-11-21 RX ADMIN — IOPAMIDOL 82 ML: 755 INJECTION, SOLUTION INTRAVASCULAR at 08:32

## 2017-11-28 ENCOUNTER — OFFICE VISIT (OUTPATIENT)
Dept: FAMILY MEDICINE | Facility: CLINIC | Age: 47
End: 2017-11-28
Payer: COMMERCIAL

## 2017-11-28 VITALS
BODY MASS INDEX: 29.82 KG/M2 | DIASTOLIC BLOOD PRESSURE: 83 MMHG | OXYGEN SATURATION: 100 % | WEIGHT: 170.4 LBS | HEART RATE: 74 BPM | SYSTOLIC BLOOD PRESSURE: 127 MMHG | TEMPERATURE: 97.6 F

## 2017-11-28 DIAGNOSIS — K21.9 GASTROESOPHAGEAL REFLUX DISEASE, ESOPHAGITIS PRESENCE NOT SPECIFIED: ICD-10-CM

## 2017-11-28 DIAGNOSIS — R10.84 ABDOMINAL PAIN, GENERALIZED: Primary | ICD-10-CM

## 2017-11-28 PROCEDURE — 99214 OFFICE O/P EST MOD 30 MIN: CPT | Performed by: NURSE PRACTITIONER

## 2017-11-28 NOTE — MR AVS SNAPSHOT
After Visit Summary   11/28/2017    Arcelia Liz    MRN: [id number]           Patient Information     Date Of Birth          1970        Visit Information        Provider Department      11/28/2017 5:20 PM Lily Galindo NP [facility]        Today's Diagnoses     Abdominal pain, generalized    -  1    Gastroesophageal reflux disease, esophagitis presence not specified          Care Instructions    Take medication as prescribed. Stop Omeprazole.  Schedule with GI.  Abdominal Pain    Abdominal pain is pain in the stomach or belly area. Everyone has this pain from time to time. In many cases it goes away on its own. But abdominal pain can sometimes be due to a serious problem, such as appendicitis. So it s important to know when to seek help.  Causes of abdominal pain  There are many possible causes of abdominal pain. Common causes in adults include:    Constipation, diarrhea, or gas    Stomach acid flowing back up into the esophagus (acid reflux or heartburn)    Severe acid reflux, called GERD (gastroesophageal reflux disease)    A sore in the lining of the stomach or small intestine (peptic ulcer)    Inflammation of the gallbladder, liver, or pancreas    Gallstones or kidney stones    Appendicitis     Intestinal blockage     An internal organ pushing through a muscle or other tissue (hernia)    Urinary tract infections    In women, menstrual cramps, fibroids, or endometriosis    Inflammation or infection of the intestines  Diagnosing the cause of abdominal pain  Your healthcare provider will do a physical exam help find the cause of your pain. If needed, tests will be ordered. Belly pain has many possible causes. So it can be hard to find the reason for your pain. Giving details about your pain can help. Tell your provider where and when you feel the pain, and what makes it better or worse. Also let your provider know if you have other symptoms such as:    Fever    Tiredness    Upset  stomach (nausea)    Vomiting    Changes in bathroom habits  Treating abdominal pain  Some causes of pain need emergency medical treatment right away. These include appendicitis or a bowel blockage. Other problems can be treated with rest, fluids, or medicines. Your healthcare provider can give you specific instructions for treatment or self-care based on what is causing your pain.  If you have vomiting or diarrhea, sip water or other clear fluids. When you are ready to eat solid foods again, start with small amounts of easy-to-digest, low-fat foods. These include apple sauce, toast, or crackers.   When to seek medical care  Call 911 or go to the hospital right away if you:    Can t pass stool and are vomiting    Are vomiting blood or have bloody diarrhea or black, tarry diarrhea    Have chest, neck, or shoulder pain    Feel like you might pass out    Have pain in your shoulder blades with nausea    Have sudden, severe belly pain    Have new, severe pain unlike any you have felt before    Have a belly that is rigid, hard, and tender to touch  Call your healthcare provider if you have:    Pain for more than 5 days    Bloating for more than 2 days    Diarrhea for more than 5 days    A fever of 100.4 F (38.0 C) or higher, or as directed by your provider    Pain that gets worse    Weight loss for no reason    Continued lack of appetite    Blood in your stool  How to prevent abdominal pain  Here are some tips to help prevent abdominal pain:    Eat smaller amounts of food at one time.    Avoid greasy, fried, or other high-fat foods.    Avoid foods that give you gas.    Exercise regularly.    Drink plenty of fluids.  To help prevent GERD symptoms:    Quit smoking.    Reduce alcohol and certain foods that increase stomach acid.    Avoid aspirin and over-the-counter pain and fever medicines (NSAIDS or nonsteroidal anti-inflammatory drugs), if possible    Lose extra weight.    Finish eating at least 2 hours before you go to  bed or lie down.    Raise the head of your bed.  Date Last Reviewed: 7/1/2016 2000-2017 [employer]. [address] [place] [address]. All rights reserved. This information is not intended as a substitute for professional medical care. Always follow your healthcare professional's instructions.        Tips to Control Acid Reflux    To control acid reflux, you ll need to make some basic diet and lifestyle changes. The simple steps outlined below may be all you ll need to ease discomfort.  Watch what you eat    Avoid fatty foods and spicy foods.    Eat fewer acidic foods, such as citrus and tomato-based foods. These can increase symptoms.    Limit drinking alcohol, caffeine, and fizzy beverages. All increase acid reflux.    Try limiting chocolate, peppermint, and spearmint. These can worsen acid reflux in some people.  Watch when you eat    Avoid lying down for 3 hours after eating.    Do not snack before going to bed.  Raise your head  Raising your head and upper body by 4 to 6 inches helps limit reflux when you re lying down. Put blocks under the head of your bed frame to raise it.  Other changes    Lose weight, if you need to    Don t exercise near bedtime    Avoid tight-fitting clothes    Limit aspirin and ibuprofen    Stop smoking   Date Last Reviewed: 7/1/2016 2000-2017 [employer]. [address] [place] [address]. All rights reserved. This information is not intended as a substitute for professional medical care. Always follow your healthcare professional's instructions.                Follow-ups after your visit        Additional Services     GASTROENTEROLOGY ADULT REF CONSULT ONLY       [facility], [facility]: [phone] and [facility] [phone]      Please be aware that coverage of these services is subject to the terms and limitations of your health insurance plan.  Call member services at your health plan  with any benefit or coverage questions.  Any procedures must be performed at a [place] facility OR coordinated by your clinic's referral office.    Please bring the following with you to your appointment:    (1) Any X-Rays, CTs or MRIs which have been performed.  Contact the facility where they were done to arrange for  prior to your scheduled appointment.    (2) List of current medications   (3) This referral request   (4) Any documents/labs given to you for this referral                  Follow-up notes from your care team     Return if symptoms worsen or fail to improve.      Who to contact     Normal or non-critical lab and imaging results will be communicated to you by [employer], letter or phone within 4 business days after the clinic has received the results. If you do not hear from us within 7 days, please contact the clinic through [employer] or phone. If you have a critical or abnormal lab result, we will notify you by phone as soon as possible.  Submit refill requests through "[employer] or call your pharmacy and they will forward the refill request to us. Please allow 3 business days for your refill to be completed.          If you need to speak with a  for additional information , please call: [phone]             Additional Information About Your Visit        "[employer] Information     "[employer] gives you secure access to your electronic health record. If you see a primary care provider, you can also send messages to your care team and make appointments. If you have questions, please call your primary care clinic.  If you do not have a primary care provider, please call [phone] and they will assist you.        Care EveryWhere ID     This is your Care EveryWhere ID. This could be used by other organizations to access your [place] medical records  [id number]        Your Vitals Were     Pulse Temperature Last Period Pulse Oximetry BMI (Body Mass Index)       74 97.6  F (36.4  C)  (Oral) 11/08/2017 (Approximate) 100% 29.82 kg/m2        Blood Pressure from Last 3 Encounters:   11/28/17 127/83   11/14/17 124/84   10/16/17 128/84    Weight from Last 3 Encounters:   11/28/17 170 lb 6.4 oz (77.3 kg)   11/14/17 168 lb 3.2 oz (76.3 kg)   10/16/17 166 lb 9.6 oz (75.6 kg)              We Performed the Following     GASTROENTEROLOGY ADULT REF CONSULT ONLY          Today's Medication Changes          These changes are accurate as of: 11/28/17  5:38 PM.  If you have any questions, ask your nurse or doctor.               Start taking these medicines.        Dose/Directions    ranitidine 150 MG tablet   Commonly known as:  ZANTAC   Used for:  Gastroesophageal reflux disease, esophagitis presence not specified   Started by:  Lily Galindo NP        Dose:  150 mg   Take 1 tablet (150 mg) by mouth 2 times daily   Quantity:  60 tablet   Refills:  1         Stop taking these medicines if you haven't already. Please contact your care team if you have questions.     omeprazole 40 MG capsule   Commonly known as:  priLOSEC   Stopped by:  Lily Galindo NP                Where to get your medicines      These medications were sent to [facility] - [address] [facility]  [id number] [place], [place] [address] [address]     Phone:  [phone]     ranitidine 150 MG tablet                Primary Care Provider Office Phone # Fax #    Lily Galindo [phone]       [address] [facility] [place] [place]  [place] [address]        Equal Access to Services     [facility]: Hadii aad ku hadasho Soomaali, waaxda luqadaha, qaybta kaalmada adeegyamalena, waxay magaly reynolds . So [facility] [place] [phone].    ATENCIÓN: Si habla español, tiene a adams disposición servicios gratuitos de asistencia lingüística. Llame al [phone].    We comply with applicable federal civil rights laws and [place] laws. We do not discriminate on the basis of race, color, national origin, age, disability, sex,  sexual orientation, or gender identity.            Thank you!     Thank you for choosing [facility]  for your care. Our goal is always to provide you with excellent care. Hearing back from our patients is one way we can continue to improve our services. Please take a few minutes to complete the written survey that you may receive in the mail after your visit with us. Thank you!             Your Updated Medication List - Protect others around you: Learn how to safely use, store and throw away your medicines at www.disposemymeds.org.          This list is accurate as of: 11/28/17  5:38 PM.  Always use your most recent med list.                   Brand Name Dispense Instructions for use Diagnosis    ferrous sulfate 142 (45 FE) MG Tbcr    SLO-FE    90 tablet    Take 1 tablet (142 mg) by mouth daily    Anemia, unspecified type       ranitidine 150 MG tablet    ZANTAC    60 tablet    Take 1 tablet (150 mg) by mouth 2 times daily    Gastroesophageal reflux disease, esophagitis presence not specified       sertraline 50 MG tablet    ZOLOFT    30 tablet    Take 1/2 tablet (25 mg) for 1-2 weeks, then increase to 1 tablet orally daily    Adjustment disorder with depressed mood       Simethicone 180 MG Caps     60 capsule    Take 1 capsule by mouth daily as needed    Flatulence, eructation and gas pain

## 2017-11-28 NOTE — PATIENT INSTRUCTIONS
Take medication as prescribed. Stop Omeprazole.  Schedule with GI.  Abdominal Pain    Abdominal pain is pain in the stomach or belly area. Everyone has this pain from time to time. In many cases it goes away on its own. But abdominal pain can sometimes be due to a serious problem, such as appendicitis. So it s important to know when to seek help.  Causes of abdominal pain  There are many possible causes of abdominal pain. Common causes in adults include:    Constipation, diarrhea, or gas    Stomach acid flowing back up into the esophagus (acid reflux or heartburn)    Severe acid reflux, called GERD (gastroesophageal reflux disease)    A sore in the lining of the stomach or small intestine (peptic ulcer)    Inflammation of the gallbladder, liver, or pancreas    Gallstones or kidney stones    Appendicitis     Intestinal blockage     An internal organ pushing through a muscle or other tissue (hernia)    Urinary tract infections    In women, menstrual cramps, fibroids, or endometriosis    Inflammation or infection of the intestines  Diagnosing the cause of abdominal pain  Your healthcare provider will do a physical exam help find the cause of your pain. If needed, tests will be ordered. Belly pain has many possible causes. So it can be hard to find the reason for your pain. Giving details about your pain can help. Tell your provider where and when you feel the pain, and what makes it better or worse. Also let your provider know if you have other symptoms such as:    Fever    Tiredness    Upset stomach (nausea)    Vomiting    Changes in bathroom habits  Treating abdominal pain  Some causes of pain need emergency medical treatment right away. These include appendicitis or a bowel blockage. Other problems can be treated with rest, fluids, or medicines. Your healthcare provider can give you specific instructions for treatment or self-care based on what is causing your pain.  If you have vomiting or diarrhea, sip water or  other clear fluids. When you are ready to eat solid foods again, start with small amounts of easy-to-digest, low-fat foods. These include apple sauce, toast, or crackers.   When to seek medical care  Call 911 or go to the hospital right away if you:    Can t pass stool and are vomiting    Are vomiting blood or have bloody diarrhea or black, tarry diarrhea    Have chest, neck, or shoulder pain    Feel like you might pass out    Have pain in your shoulder blades with nausea    Have sudden, severe belly pain    Have new, severe pain unlike any you have felt before    Have a belly that is rigid, hard, and tender to touch  Call your healthcare provider if you have:    Pain for more than 5 days    Bloating for more than 2 days    Diarrhea for more than 5 days    A fever of 100.4 F (38.0 C) or higher, or as directed by your provider    Pain that gets worse    Weight loss for no reason    Continued lack of appetite    Blood in your stool  How to prevent abdominal pain  Here are some tips to help prevent abdominal pain:    Eat smaller amounts of food at one time.    Avoid greasy, fried, or other high-fat foods.    Avoid foods that give you gas.    Exercise regularly.    Drink plenty of fluids.  To help prevent GERD symptoms:    Quit smoking.    Reduce alcohol and certain foods that increase stomach acid.    Avoid aspirin and over-the-counter pain and fever medicines (NSAIDS or nonsteroidal anti-inflammatory drugs), if possible    Lose extra weight.    Finish eating at least 2 hours before you go to bed or lie down.    Raise the head of your bed.  Date Last Reviewed: 7/1/2016 2000-2017 [employer]. [address] [place] [address], [place] [address]. All rights reserved. This information is not intended as a substitute for professional medical care. Always follow your healthcare professional's instructions.        Tips to Control Acid Reflux    To control acid reflux, you ll need to make some basic diet and lifestyle  changes. The simple steps outlined below may be all you ll need to ease discomfort.  Watch what you eat    Avoid fatty foods and spicy foods.    Eat fewer acidic foods, such as citrus and tomato-based foods. These can increase symptoms.    Limit drinking alcohol, caffeine, and fizzy beverages. All increase acid reflux.    Try limiting chocolate, peppermint, and spearmint. These can worsen acid reflux in some people.  Watch when you eat    Avoid lying down for 3 hours after eating.    Do not snack before going to bed.  Raise your head  Raising your head and upper body by 4 to 6 inches helps limit reflux when you re lying down. Put blocks under the head of your bed frame to raise it.  Other changes    Lose weight, if you need to    Don t exercise near bedtime    Avoid tight-fitting clothes    Limit aspirin and ibuprofen    Stop smoking   Date Last Reviewed: 7/1/2016 2000-2017 [employer]. [address] [place] [address], [place] [address]. All rights reserved. This information is not intended as a substitute for professional medical care. Always follow your healthcare professional's instructions.

## 2017-11-28 NOTE — PROGRESS NOTES
SUBJECTIVE:   Arcelia Liz is a 47 year old female who presents to clinic today for the following health issues:      Following up on: stomach issues      Last visit this was discussed: 11/14/17    Progression of Symptoms:  same    Accompanying Signs & Symptoms: acid reflux    Taking Medication as prescribed: yes    Side Effects:  None    Medication Helping Symptoms: helping some, causing her nausea         Problem list and histories reviewed & adjusted, as indicated.  Additional history: as documented    Patient Active Problem List   Diagnosis     CARDIOVASCULAR SCREENING; LDL GOAL LESS THAN 160     Epigastric pain     Mantoux: positive     Inflammatory acne     Comedonal acne     Anemia     Past Surgical History:   Procedure Laterality Date     COLONOSCOPY  9/6/2012    Procedure: COLONOSCOPY;  COLONOSCOPY, CHRONIC LOWER ABD PAIN;  Surgeon: Jordan Mitchell MD;  Location: [facility] OR     DILATION AND CURETTAGE  2005    MAB, twin gestation       Social History   Substance Use Topics     Smoking status: Never Smoker     Smokeless tobacco: Never Used      Comment: Lives in smoke free household     Alcohol use No     Family History   Problem Relation Age of Onset     Asthma Father      DIABETES Father      Glaucoma No family hx of      Macular Degeneration No family hx of      Thyroid Disease No family hx of      CEREBROVASCULAR DISEASE No family hx of      CANCER No family hx of      Hypertension No family hx of      C.A.D. No family hx of      Lupus No family hx of      Thrombophilia No family hx of      Psoriasis No family hx of      Eczema No family hx of      Melanoma No family hx of          Current Outpatient Prescriptions   Medication Sig Dispense Refill     ranitidine (ZANTAC) 150 MG tablet Take 1 tablet (150 mg) by mouth 2 times daily 60 tablet 1     sertraline (ZOLOFT) 50 MG tablet Take 1/2 tablet (25 mg) for 1-2 weeks, then increase to 1 tablet orally daily 30 tablet 1     Simethicone 180 MG CAPS Take 1  capsule by mouth daily as needed 60 capsule 0     ferrous sulfate (SLO-FE) 142 (45 FE) MG TBCR Take 1 tablet (142 mg) by mouth daily 90 tablet 1     Allergies   Allergen Reactions     Nkda [No Known Drug Allergies]      BP Readings from Last 3 Encounters:   11/28/17 127/83   11/14/17 124/84   10/16/17 128/84    Wt Readings from Last 3 Encounters:   11/28/17 170 lb 6.4 oz (77.3 kg)   11/14/17 168 lb 3.2 oz (76.3 kg)   10/16/17 166 lb 9.6 oz (75.6 kg)                  Labs reviewed in EPIC        Reviewed and updated as needed this visit by clinical staffTobacco  Allergies  Meds  Med Hx  Surg Hx  Fam Hx  Soc Hx      Reviewed and updated as needed this visit by Provider         ROS:  Constitutional, HEENT, cardiovascular, pulmonary, GI, , musculoskeletal, neuro, skin, endocrine and psych systems are negative, except as otherwise noted.      OBJECTIVE:   /83  Pulse 74  Temp 97.6  F (36.4  C) (Oral)  Wt 170 lb 6.4 oz (77.3 kg)  LMP 11/08/2017 (Approximate)  SpO2 100%  BMI 29.82 kg/m2  Body mass index is 29.82 kg/(m^2).  GENERAL: healthy, alert and no distress  RESP: lungs clear to auscultation - no rales, rhonchi or wheezes  CV: regular rate and rhythm, normal S1 S2, no S3 or S4, no murmur, click or rub, no peripheral edema and peripheral pulses strong  ABDOMEN: soft, nontender, no hepatosplenomegaly, no masses and bowel sounds normal  PSYCH: mentation appears normal, affect normal/bright    Diagnostic Test Results:  none     ASSESSMENT/PLAN:         ICD-10-CM    1. Abdominal pain, generalized R10.84 GASTROENTEROLOGY ADULT REF CONSULT ONLY    unsure of cause, please evaluate for appropriate diagnosis.    2. Gastroesophageal reflux disease, esophagitis presence not specified K21.9 ranitidine (ZANTAC) 150 MG tablet    Omeprazole not effective       See Patient Instructions: Take medication as prescribed. Stop Omeprazole.  Schedule with GI.    Lily Galindo, P  [facility]

## 2017-11-28 NOTE — NURSING NOTE
"Chief Complaint   Patient presents with     RECHECK       Initial /83  Pulse 74  Temp 97.6  F (36.4  C) (Oral)  Wt 170 lb 6.4 oz (77.3 kg)  LMP 11/08/2017 (Approximate)  SpO2 100%  BMI 29.82 kg/m2 Estimated body mass index is 29.82 kg/(m^2) as calculated from the following:    Height as of 10/16/17: 5' 3.39\" (1.61 m).    Weight as of this encounter: 170 lb 6.4 oz (77.3 kg).  Medication Reconciliation: complete     Monika Winters MA  "

## 2017-12-08 ENCOUNTER — OFFICE VISIT (OUTPATIENT)
Dept: FAMILY MEDICINE | Facility: CLINIC | Age: 47
End: 2017-12-08
Payer: COMMERCIAL

## 2017-12-08 ENCOUNTER — RADIANT APPOINTMENT (OUTPATIENT)
Dept: GENERAL RADIOLOGY | Facility: CLINIC | Age: 47
End: 2017-12-08
Attending: NURSE PRACTITIONER
Payer: COMMERCIAL

## 2017-12-08 VITALS
RESPIRATION RATE: 16 BRPM | SYSTOLIC BLOOD PRESSURE: 112 MMHG | TEMPERATURE: 98.2 F | HEIGHT: 63 IN | BODY MASS INDEX: 29.77 KG/M2 | HEART RATE: 79 BPM | OXYGEN SATURATION: 100 % | WEIGHT: 168 LBS | DIASTOLIC BLOOD PRESSURE: 74 MMHG

## 2017-12-08 DIAGNOSIS — L08.9 LOCAL INFECTION OF SKIN AND SUBCUTANEOUS TISSUE: Primary | ICD-10-CM

## 2017-12-08 DIAGNOSIS — M79.5 FOREIGN BODY (FB) IN SOFT TISSUE: ICD-10-CM

## 2017-12-08 PROCEDURE — 99213 OFFICE O/P EST LOW 20 MIN: CPT | Performed by: NURSE PRACTITIONER

## 2017-12-08 PROCEDURE — 73130 X-RAY EXAM OF HAND: CPT | Mod: RT

## 2017-12-08 RX ORDER — CEPHALEXIN 500 MG/1
500 CAPSULE ORAL 2 TIMES DAILY
Qty: 20 CAPSULE | Refills: 0 | Status: SHIPPED | OUTPATIENT
Start: 2017-12-08 | End: 2018-02-14

## 2017-12-08 ASSESSMENT — PATIENT HEALTH QUESTIONNAIRE - PHQ9: SUM OF ALL RESPONSES TO PHQ QUESTIONS 1-9: 2

## 2017-12-08 NOTE — PATIENT INSTRUCTIONS
Foreign body not seen.   Foreign Object Under the Skin, Not Removed  Very small particles that remain under the skin usually don t cause problems or need further treatment. But occasionally they can cause an infection. Sometimes they work their way to the surface on their own without any problems. If you see this happening, you can remove any particles with tweezers, but be careful not to dig up the skin and make things worse.  Home care  Wound care    Keep the wound clean and dry.    If there is a dressing or bandage, change it when it gets wet or dirty. Otherwise, leave it on for the first 24 hours, then change it once a day or as often as you were instructed.    If stitches or staples were used, clean the wound every day:    After taking off the dressing, wash the area gently with soap and water.    Apply a thin layer of antibiotic ointment to the cut. This will keep the wound clean and make it easier to remove the stitches. If it is oozing a lot, you can put a nonstick dressing over it. Then reapply the bandage or dressing as you were instructed.    You can get it wet, just like when you clean it. This means you can shower as usual for the first 24 hours, but do not soak the area in water (no baths or swimming) until the stitches or staples are taken out.    If surgical tape or strips were used, keep the area clean and dry. If it becomes wet, blot it dry with a towel.  Medicine    You can take over-the-counter medicine for pain, unless you were given a different pain medicine to use. If you have chronic liver or kidney disease or ever had a stomach ulcer, or gastrointestinal bleeding or are taking blood thinner medicines, talk with your healthcare provider before using these medicines.    If you were given antibiotics, take them until they are used up. It is important to finish the antibiotics even if the wound looks better to make sure the infection clears.  Follow-up care  Follow up your healthcare provider,  or as advised. Keep in mind the following:    Watch for any signs of infection, such as increasing pain, redness, swelling, or pus drainage. If this happens, don t wait for your scheduled visit, rather see your healthcare provider sooner.    Stitches or staples are usually taken out within 5 to 14 days. This varies depending on what part of your body they are on, and the type of wound. The healthcare provider will tell you how long they should be left in.    If surgical tape or strips were used, they are usually left on for 7 to 10 days. You can remove them after that unless you were told otherwise. If you try to remove them, and it is too difficult, soaking can help. If the edges of the cut pull apart, then stop removing the tape, and follow up with your healthcare provider.    If X-rays were taken, you will be told if there are new findings that may affect your care.  When to seek medical advice  Call your healthcare provider right away if any of these occur:    Fever of 100.4 F (38 C) or higher, or as directed by your healthcare provider    Increasing pain in the wound    Redness, swelling or pus coming from the wound  Date Last Reviewed: 10/1/2016    9795-3493 [employer]. [address] [place] [address], [place] [address]. All rights reserved. This information is not intended as a substitute for professional medical care. Always follow your healthcare professional's instructions.

## 2017-12-08 NOTE — PROGRESS NOTES
"  SUBJECTIVE:   Arcelia Liz is a 47 year old female who presents to clinic today for the following health issues:      Concern - foreign body in skin-glass in right hand  Onset: 1 week    Description:   Glass in right hand     Intensity: mild    Progression of Symptoms:      Accompanying Signs & Symptoms:  Redness mild swelling      Therapies Tried and outcome: none          Problem list and histories reviewed & adjusted, as indicated.  Additional history: as documented    BP Readings from Last 3 Encounters:   12/08/17 112/74   11/28/17 127/83   11/14/17 124/84    Wt Readings from Last 3 Encounters:   12/08/17 168 lb (76.2 kg)   11/28/17 170 lb 6.4 oz (77.3 kg)   11/14/17 168 lb 3.2 oz (76.3 kg)                      Reviewed and updated as needed this visit by clinical staffTobacco  Allergies  Meds       Reviewed and updated as needed this visit by Provider         ROS:  Constitutional, HEENT, cardiovascular, pulmonary, GI, , musculoskeletal, neuro, skin, endocrine and psych systems are negative, except as otherwise noted.      OBJECTIVE:   /74  Pulse 79  Temp 98.2  F (36.8  C) (Tympanic)  Resp 16  Ht 5' 3\" (1.6 m)  Wt 168 lb (76.2 kg)  LMP 11/08/2017 (Approximate)  SpO2 100%  BMI 29.76 kg/m2  Body mass index is 29.76 kg/(m^2).  GENERAL: healthy, alert and no distress  RESP: lungs clear to auscultation - no rales, rhonchi or wheezes  CV: regular rate and rhythm, normal S1 S2, no S3 or S4, no murmur, click or rub, no peripheral edema and peripheral pulses strong  SKIN: no suspicious lesions or rashes POSITIVE for small cut to palm of R hand with redness and swelling.   PSYCH: mentation appears normal, affect normal/bright    Diagnostic Test Results:  See orders- no foreign body seen on xray, with microscope or with feeling.     ASSESSMENT/PLAN:         ICD-10-CM    1. Local infection of skin and subcutaneous tissue L08.9 cephALEXin (KEFLEX) 500 MG capsule   2. Foreign body (FB) in soft tissue " M79.5 XR Hand Right G/E 3 Views     cephALEXin (KEFLEX) 500 MG capsule    glass per pt 3 days ago       See Patient Instructions: Foreign body not seen. Follow instructions on discharge paperwork.     VINAYAK Perera  [facility]

## 2017-12-08 NOTE — MR AVS SNAPSHOT
After Visit Summary   12/8/2017    Arcelia Liz    MRN: [id number]           Patient Information     Date Of Birth          1970        Visit Information        Provider Department      12/8/2017 9:20 AM Lily Galindo NP [facility]        Today's Diagnoses     Local infection of skin and subcutaneous tissue    -  1    Foreign body (FB) in soft tissue          Care Instructions    Foreign body not seen.   Foreign Object Under the Skin, Not Removed  Very small particles that remain under the skin usually don t cause problems or need further treatment. But occasionally they can cause an infection. Sometimes they work their way to the surface on their own without any problems. If you see this happening, you can remove any particles with tweezers, but be careful not to dig up the skin and make things worse.  Home care  Wound care    Keep the wound clean and dry.    If there is a dressing or bandage, change it when it gets wet or dirty. Otherwise, leave it on for the first 24 hours, then change it once a day or as often as you were instructed.    If stitches or staples were used, clean the wound every day:    After taking off the dressing, wash the area gently with soap and water.    Apply a thin layer of antibiotic ointment to the cut. This will keep the wound clean and make it easier to remove the stitches. If it is oozing a lot, you can put a nonstick dressing over it. Then reapply the bandage or dressing as you were instructed.    You can get it wet, just like when you clean it. This means you can shower as usual for the first 24 hours, but do not soak the area in water (no baths or swimming) until the stitches or staples are taken out.    If surgical tape or strips were used, keep the area clean and dry. If it becomes wet, blot it dry with a towel.  Medicine    You can take over-the-counter medicine for pain, unless you were given a different pain medicine to use. If you have  chronic liver or kidney disease or ever had a stomach ulcer, or gastrointestinal bleeding or are taking blood thinner medicines, talk with your healthcare provider before using these medicines.    If you were given antibiotics, take them until they are used up. It is important to finish the antibiotics even if the wound looks better to make sure the infection clears.  Follow-up care  Follow up your healthcare provider, or as advised. Keep in mind the following:    Watch for any signs of infection, such as increasing pain, redness, swelling, or pus drainage. If this happens, don t wait for your scheduled visit, rather see your healthcare provider sooner.    Stitches or staples are usually taken out within 5 to 14 days. This varies depending on what part of your body they are on, and the type of wound. The healthcare provider will tell you how long they should be left in.    If surgical tape or strips were used, they are usually left on for 7 to 10 days. You can remove them after that unless you were told otherwise. If you try to remove them, and it is too difficult, soaking can help. If the edges of the cut pull apart, then stop removing the tape, and follow up with your healthcare provider.    If X-rays were taken, you will be told if there are new findings that may affect your care.  When to seek medical advice  Call your healthcare provider right away if any of these occur:    Fever of 100.4 F (38 C) or higher, or as directed by your healthcare provider    Increasing pain in the wound    Redness, swelling or pus coming from the wound  Date Last Reviewed: 10/1/2016    9529-9174 [employer]. [address] [place] [address], [place] [address]. All rights reserved. This information is not intended as a substitute for professional medical care. Always follow your healthcare professional's instructions.                Follow-ups after your visit        Follow-up notes from your care team     Return if symptoms  "worsen or fail to improve.      Who to contact     Normal or non-critical lab and imaging results will be communicated to you by [employer], letter or phone within 4 business days after the clinic has received the results. If you do not hear from us within 7 days, please contact the clinic through [employer] or phone. If you have a critical or abnormal lab result, we will notify you by phone as soon as possible.  Submit refill requests through [employer] or call your pharmacy and they will forward the refill request to us. Please allow 3 business days for your refill to be completed.          If you need to speak with a  for additional information , please call: [phone]             Additional Information About Your Visit        [employer] Information     [employer] gives you secure access to your electronic health record. If you see a primary care provider, you can also send messages to your care team and make appointments. If you have questions, please call your primary care clinic.  If you do not have a primary care provider, please call [phone] and they will assist you.        Care EveryWhere ID     This is your Care EveryWhere ID. This could be used by other organizations to access your [place] medical records  [id number]        Your Vitals Were     Pulse Temperature Respirations Height Last Period Pulse Oximetry    79 98.2  F (36.8  C) (Tympanic) 16 5' 3\" (1.6 m) 11/08/2017 (Approximate) 100%    BMI (Body Mass Index)                   29.76 kg/m2            Blood Pressure from Last 3 Encounters:   02/14/18 117/81   12/21/17 118/82   12/08/17 112/74    Weight from Last 3 Encounters:   02/14/18 177 lb (80.3 kg)   12/21/17 172 lb (78 kg)   12/08/17 168 lb (76.2 kg)              We Performed the Following     DEPRESSION ACTION PLAN (DAP)          Today's Medication Changes          These changes are accurate as of 12/8/17 11:59 PM.  If you have any questions, ask your nurse or doctor.             "   Start taking these medicines.        Dose/Directions    cephALEXin 500 MG capsule   Commonly known as:  KEFLEX   Used for:  Foreign body (FB) in soft tissue, Local infection of skin and subcutaneous tissue   Started by:  Lily Galindo NP        Dose:  500 mg   Take 1 capsule (500 mg) by mouth 2 times daily   Quantity:  20 capsule   Refills:  0            Where to get your medicines      These medications were sent to [facility]  [place] [facility] [address] [facility]  [address] [address]     Phone:  [phone]     cephALEXin 500 MG capsule                Primary Care Provider Office Phone # Fax #    Lily Galindo [phone]       [address] [facility] [place] [address]        Equal Access to Services     [facility]: Hadii elizabet ku hadasho Soomaali, waaxda luqadaha, qaybta kaalmada adeegyada, waxay flavioin haysoren reynolds . So [facility] [phone].    ATENCIÓN: Si habla español, tiene a adams disposición servicios gratuitos de asistencia lingüística. [facility] [phone].    We comply with applicable federal civil rights laws and [place] laws. We do not discriminate on the basis of race, color, national origin, age, disability, sex, sexual orientation, or gender identity.            Thank you!     Thank you for choosing [facility]  for your care. Our goal is always to provide you with excellent care. Hearing back from our patients is one way we can continue to improve our services. Please take a few minutes to complete the written survey that you may receive in the mail after your visit with us. Thank you!             Your Updated Medication List - Protect others around you: Learn how to safely use, store and throw away your medicines at www.disposemymeds.org.          This list is accurate as of 12/8/17 11:59 PM.  Always use your most recent med list.                   Brand Name Dispense Instructions for use Diagnosis    cephALEXin  500 MG capsule    KEFLEX    20 capsule    Take 1 capsule (500 mg) by mouth 2 times daily    Foreign body (FB) in soft tissue, Local infection of skin and subcutaneous tissue       ferrous sulfate 142 (45 Fe) MG Tbcr    SLO-FE    90 tablet    Take 1 tablet (142 mg) by mouth daily    Anemia, unspecified type

## 2017-12-08 NOTE — LETTER
[facility]  [address] [place] [address]  [phone]          December 8, 2017    RE:  Arcelia Liz                                                                                                                                                       [address] [place] [address]            To whom it may concern:    Arcelia Liz is under my professional care, she withdrew from school due to several medical reasons, and it is now appropriate for her to return to school.  Please let me know if you have any questions/ concerns.       Sincerely,        Lily Galindo RN, CNP

## 2017-12-08 NOTE — LETTER
My Depression Action Plan  Name: Arcelia Liz   Date of Birth 1970  Date: 12/8/2017    My doctor: Lily Galindo   My clinic: [address] [place] [id number] [phone]          GREEN    ZONE   Good Control    What it looks like:     Things are going generally well. You have normal up s and down s. You may even feel depressed from time to time, but bad moods usually last less than a day.   What you need to do:  1. Continue to care for yourself (see self care plan)  2. Check your depression survival kit and update it as needed  3. Follow your physician s recommendations including any medication.  4. Do not stop taking medication unless you consult with your physician first.           YELLOW         ZONE Getting Worse    What it looks like:     Depression is starting to interfere with your life.     It may be hard to get out of bed; you may be starting to isolate yourself from others.    Symptoms of depression are starting to last most all day and this has happened for several days.     You may have suicidal thoughts but they are not constant.   What you need to do:     1. Call your care team, your response to treatment will improve if you keep your care team informed of your progress. Yellow periods are signs an adjustment may need to be made.     2. Continue your self-care, even if you have to fake it!    3. Talk to someone in your support network    4. Open up your depression survival kit           RED    ZONE Medical Alert - Get Help    What it looks like:     Depression is seriously interfering with your life.     You may experience these or other symptoms: You can t get out of bed most days, can t work or engage in other necessary activities, you have trouble taking care of basic hygiene, or basic responsibilities, thoughts of suicide or death that will not go away, self-injurious behavior.     What you need to do:  1. Call your care team and  request a same-day appointment. If they are not available (weekends or after hours) call your local crisis line, emergency room or 911.      Electronically signed by: Debora Diaz, December 8, 2017    Depression Self Care Plan / Survival Kit    Self-Care for Depression  Here s the deal. Your body and mind are really not as separate as most people think.  What you do and think affects how you feel and how you feel influences what you do and think. This means if you do things that people who feel good do, it will help you feel better.  Sometimes this is all it takes.  There is also a place for medication and therapy depending on how severe your depression is, so be sure to consult with your medical provider and/ or Behavioral Health Consultant if your symptoms are worsening or not improving.     In order to better manage my stress, I will:    Exercise  Get some form of exercise, every day. This will help reduce pain and release endorphins, the  feel good  chemicals in your brain. This is almost as good as taking antidepressants!  This is not the same as joining a gym and then never going! (they count on that by the way ) It can be as simple as just going for a walk or doing some gardening, anything that will get you moving.      Hygiene   Maintain good hygiene (Get out of bed in the morning, Make your bed, Brush your teeth, Take a shower, and Get dressed like you were going to work, even if you are unemployed).  If your clothes don't fit try to get ones that do.    Diet  I will strive to eat foods that are good for me, drink plenty of water, and avoid excessive sugar, caffeine, alcohol, and other mood-altering substances.  Some foods that are helpful in depression are: complex carbohydrates, B vitamins, flaxseed, fish or fish oil, fresh fruits and vegetables.    Psychotherapy  I agree to participate in Individual Therapy (if recommended).    Medication  If prescribed medications, I agree to take them.  Missing doses  can result in serious side effects.  I understand that drinking alcohol, or other illicit drug use, may cause potential side effects.  I will not stop my medication abruptly without first discussing it with my provider.    Staying Connected With Others  I will stay in touch with my friends, family members, and my primary care provider/team.    Use your imagination  Be creative.  We all have a creative side; it doesn t matter if it s oil painting, sand castles, or mud pies! This will also kick up the endorphins.    Witness Beauty  (AKA stop and smell the roses) Take a look outside, even in mid-winter. Notice colors, textures. Watch the squirrels and birds.     Service to others  Be of service to others.  There is always someone else in need.  By helping others we can  get out of ourselves  and remember the really important things.  This also provides opportunities for practicing all the other parts of the program.    Humor  Laugh and be silly!  Adjust your TV habits for less news and crime-drama and more comedy.    Control your stress  Try breathing deep, massage therapy, biofeedback, and meditation. Find time to relax each day.     My support system    Clinic Contact:  Phone number:    Contact 1:  Phone number:    Contact 2:  Phone number:    Nondenominational/:  Phone number:    Therapist:  Phone number:    Local crisis center:    Phone number:    Other community support:  Phone number:

## 2017-12-21 ENCOUNTER — OFFICE VISIT (OUTPATIENT)
Dept: FAMILY MEDICINE | Facility: CLINIC | Age: 47
End: 2017-12-21
Payer: COMMERCIAL

## 2017-12-21 VITALS
OXYGEN SATURATION: 100 % | DIASTOLIC BLOOD PRESSURE: 82 MMHG | SYSTOLIC BLOOD PRESSURE: 118 MMHG | HEART RATE: 77 BPM | WEIGHT: 172 LBS | BODY MASS INDEX: 30.48 KG/M2 | TEMPERATURE: 98.3 F | RESPIRATION RATE: 16 BRPM | HEIGHT: 63 IN

## 2017-12-21 DIAGNOSIS — R76.0 ABNORMAL ANTIBODY TITER: ICD-10-CM

## 2017-12-21 DIAGNOSIS — Z00.00 ROUTINE GENERAL MEDICAL EXAMINATION AT A HEALTH CARE FACILITY: Primary | ICD-10-CM

## 2017-12-21 PROCEDURE — 99396 PREV VISIT EST AGE 40-64: CPT | Performed by: NURSE PRACTITIONER

## 2017-12-21 PROCEDURE — 36415 COLL VENOUS BLD VENIPUNCTURE: CPT | Performed by: NURSE PRACTITIONER

## 2017-12-21 PROCEDURE — 86735 MUMPS ANTIBODY: CPT | Performed by: NURSE PRACTITIONER

## 2017-12-21 ASSESSMENT — PAIN SCALES - GENERAL: PAINLEVEL: NO PAIN (0)

## 2017-12-21 NOTE — NURSING NOTE
"Chief Complaint   Patient presents with     Physical       Initial /82  Pulse 77  Temp 98.3  F (36.8  C) (Oral)  Resp 16  Ht 5' 3\" (1.6 m)  Wt 172 lb (78 kg)  LMP 12/18/2017  SpO2 100%  Breastfeeding? No  BMI 30.47 kg/m2 Estimated body mass index is 30.47 kg/(m^2) as calculated from the following:    Height as of this encounter: 5' 3\" (1.6 m).    Weight as of this encounter: 172 lb (78 kg).  Medication Reconciliation: complete   Janelle Jules CMA (AAMA)      "

## 2017-12-21 NOTE — PROGRESS NOTES
SUBJECTIVE:   CC: Arcelia Liz is an 47 year old woman who presents for preventive health visit.     Healthy Habits:    Do you get at least three servings of calcium containing foods daily (dairy, green leafy vegetables, etc.)? no    Amount of exercise or daily activities, outside of work: none    Problems taking medications regularly No    Medication side effects: No    Have you had an eye exam in the past two years? yes    Do you see a dentist twice per year? yes    Do you have sleep apnea, excessive snoring or daytime drowsiness?no    Stomach issue is better now without any interventions, pt thinks could have been due to stress of losing .     Today's PHQ-2 Score:   PHQ-2 ( 1999 Pf[employer]) 12/21/2017 9/19/2017   Q1: Little interest or pleasure in doing things 0 0   Q2: Feeling down, depressed or hopeless 1 3   PHQ-2 Score 1 3         Abuse: Current or Past(Physical, Sexual or Emotional)- No  Do you feel safe in your environment - Yes  Social History   Substance Use Topics     Smoking status: Never Smoker     Smokeless tobacco: Never Used      Comment: Lives in smoke free household     Alcohol use No     If you drink alcohol do you typically have >3 drinks per day or >7 drinks per week? No                     Reviewed orders with patient.  Reviewed health maintenance and updated orders accordingly - Yes  Labs reviewed in EPIC  BP Readings from Last 3 Encounters:   12/21/17 118/82   12/08/17 112/74   11/28/17 127/83    Wt Readings from Last 3 Encounters:   12/21/17 172 lb (78 kg)   12/08/17 168 lb (76.2 kg)   11/28/17 170 lb 6.4 oz (77.3 kg)                  Patient Active Problem List   Diagnosis     CARDIOVASCULAR SCREENING; LDL GOAL LESS THAN 160     Epigastric pain     Mantoux: positive     Inflammatory acne     Comedonal acne     Anemia     Past Surgical History:   Procedure Laterality Date     COLONOSCOPY  9/6/2012    Procedure: COLONOSCOPY;  COLONOSCOPY, CHRONIC LOWER ABD PAIN;  Surgeon: Paula  Jordan Bai MD;  Location: [facility] OR     DILATION AND CURETTAGE  2005    MAB, twin gestation       Social History   Substance Use Topics     Smoking status: Never Smoker     Smokeless tobacco: Never Used      Comment: Lives in smoke free household     Alcohol use No     Family History   Problem Relation Age of Onset     Asthma Father      DIABETES Father      Glaucoma No family hx of      Macular Degeneration No family hx of      Thyroid Disease No family hx of      CEREBROVASCULAR DISEASE No family hx of      CANCER No family hx of      Hypertension No family hx of      C.A.D. No family hx of      Lupus No family hx of      Thrombophilia No family hx of      Psoriasis No family hx of      Eczema No family hx of      Melanoma No family hx of          Current Outpatient Prescriptions   Medication Sig Dispense Refill     cephALEXin (KEFLEX) 500 MG capsule Take 1 capsule (500 mg) by mouth 2 times daily 20 capsule 0     Simethicone 180 MG CAPS Take 1 capsule by mouth daily as needed 60 capsule 0     ranitidine (ZANTAC) 150 MG tablet Take 1 tablet (150 mg) by mouth 2 times daily 60 tablet 1     ferrous sulfate (SLO-FE) 142 (45 FE) MG TBCR Take 1 tablet (142 mg) by mouth daily 90 tablet 1     Allergies   Allergen Reactions     Nkda [No Known Drug Allergies]            Patient under age 50, mutual decision reflected in health maintenance.        Pertinent mammograms are reviewed under the imaging tab.  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 1990s, completed 9 mo tx inh  "2010      Past Surgical History:   Procedure Laterality Date     COLONOSCOPY  2012    Procedure: COLONOSCOPY;  COLONOSCOPY, CHRONIC LOWER ABD PAIN;  Surgeon: Jordan Mitchell MD;  Location: [facility] OR     DILATION AND CURETTAGE      MAB, twin gestation     Obstetric History       T3      L3     SAB1   TAB0   Ectopic0   Multiple0   Live Births3       # Outcome Date GA Lbr Michelet/2nd Weight Sex Delivery Anes PTL Lv   5 Term 11 41w4d 08:20 / 00:13 9 lb 3 oz (4.167 kg) M Vag-Spont None  JUAN DIEGO      Name: JAE SUAREZ      Apgar1:  9                Apgar5: 9   4 Term 06 39w3d 08:00 9 lb (4.082 kg) F Vag-Spont EPI N JUAN DIEGO      Name: [place]   3 AB  8w0d          2 SAB  6w0d    SAB      1 Term 98 39w5d 12:00 7 lb 11 oz (3.487 kg) F Vag-Spont EPI N JUAN DIEGO      Name: Anish          ROS:  C: NEGATIVE for fever, chills, change in weight  I: NEGATIVE for worrisome rashes, moles or lesions  E: NEGATIVE for vision changes or irritation  ENT: NEGATIVE for ear, mouth and throat problems  R: NEGATIVE for significant cough or SOB  B: NEGATIVE for masses, tenderness or discharge  CV: NEGATIVE for chest pain, palpitations or peripheral edema  GI: NEGATIVE for nausea, abdominal pain, heartburn, or change in bowel habits  : NEGATIVE for unusual urinary or vaginal symptoms. Periods are regular.  M: NEGATIVE for significant arthralgias or myalgia  N: NEGATIVE for weakness, dizziness or paresthesias  E: NEGATIVE for temperature intolerance, skin/hair changes  H: NEGATIVE for bleeding problems  P: NEGATIVE for changes in mood or affect    OBJECTIVE:   /82  Pulse 77  Temp 98.3  F (36.8  C) (Oral)  Resp 16  Ht 5' 3\" (1.6 m)  Wt 172 lb (78 kg)  LMP 2017  SpO2 100%  Breastfeeding? No  BMI 30.47 kg/m2  EXAM:  GENERAL: healthy, alert and no distress  EYES: Eyes grossly normal to inspection, PERRL and conjunctivae and sclerae normal  HENT: ear canals and TM's normal, nose and mouth " "without ulcers or lesions  NECK: no adenopathy, no asymmetry, masses, or scars and thyroid normal to palpation  RESP: lungs clear to auscultation - no rales, rhonchi or wheezes  BREAST: declined per pt, just had mammogram  CV: regular rate and rhythm, normal S1 S2, no S3 or S4, no murmur, click or rub, no peripheral edema and peripheral pulses strong  ABDOMEN: soft, nontender, no hepatosplenomegaly, no masses and bowel sounds normal  MS: no gross musculoskeletal defects noted, no edema  SKIN: no suspicious lesions or rashes  NEURO: Normal strength and tone, mentation intact and speech normal  PSYCH: mentation appears normal, affect normal/bright  LYMPH: no cervical, supraclavicular, axillary adenopathy    ASSESSMENT/PLAN:       ICD-10-CM    1. Routine general medical examination at a health care facility Z00.00    2. Abnormal antibody titer R76.8 Mumps Antibody IgG       COUNSELING:   Reviewed preventive health counseling, as reflected in patient instructions       reports that she has never smoked. She has never used smokeless tobacco.    Estimated body mass index is 30.47 kg/(m^2) as calculated from the following:    Height as of this encounter: 5' 3\" (1.6 m).    Weight as of this encounter: 172 lb (78 kg).   Weight management plan: Discussed healthy diet and exercise guidelines and patient will follow up in 12 months in clinic to re-evaluate.    Counseling Resources:  ATP IV Guidelines  Pooled Cohorts Equation Calculator  Breast Cancer Risk Calculator  FRAX Risk Assessment  ICSI Preventive Guidelines  Dietary Guidelines for Americans, 2010  USDA's MyPlate  ASA Prophylaxis  Lung CA Screening    Lily Galindo, VINAYAK  [facility] YANIRA  "

## 2017-12-21 NOTE — PATIENT INSTRUCTIONS
Preventive Health Recommendations  Female Ages 40 to 49    Yearly exam:     See your health care provider every year in order to  1. Review health changes.   2. Discuss preventive care.    3. Review your medicines if your doctor prescribed any.      Get a Pap test every three years (unless you have an abnormal result and your provider advises testing more often).      If you get Pap tests with HPV test, you only need to test every 5 years, unless you have an abnormal result. You do not need a Pap test if your uterus was removed (hysterectomy) and you have not had cancer.      You should be tested each year for STDs (sexually transmitted diseases), if you're at risk.       Ask your doctor if you should have a mammogram.      Have a colonoscopy (test for colon cancer) if someone in your family has had colon cancer or polyps before age 50.       Have a cholesterol test every 5 years.       Have a diabetes test (fasting glucose) after age 45. If you are at risk for diabetes, you should have this test every 3 years.    Shots: Get a flu shot each year. Get a tetanus shot every 10 years.     Nutrition:     Eat at least 5 servings of fruits and vegetables each day.    Eat whole-grain bread, whole-wheat pasta and brown rice instead of white grains and rice.    Talk to your provider about Calcium and Vitamin D.     Lifestyle    Exercise at least 150 minutes a week (an average of 30 minutes a day, 5 days a week). This will help you control your weight and prevent disease.    Limit alcohol to one drink per day.    No smoking.     Wear sunscreen to prevent skin cancer.    See your dentist every six months for an exam and cleaning.    Discharged by MAGO Ashby

## 2017-12-21 NOTE — MR AVS SNAPSHOT
After Visit Summary   12/21/2017    Arcelia Liz    MRN: [id number]           Patient Information     Date Of Birth          1970        Visit Information        Provider Department      12/21/2017 9:00 AM Lily Galindo NP [facility]        Today's Diagnoses     Abnormal antibody titer    -  1      Care Instructions      Preventive Health Recommendations  Female Ages 40 to 49    Yearly exam:     See your health care provider every year in order to  1. Review health changes.   2. Discuss preventive care.    3. Review your medicines if your doctor prescribed any.      Get a Pap test every three years (unless you have an abnormal result and your provider advises testing more often).      If you get Pap tests with HPV test, you only need to test every 5 years, unless you have an abnormal result. You do not need a Pap test if your uterus was removed (hysterectomy) and you have not had cancer.      You should be tested each year for STDs (sexually transmitted diseases), if you're at risk.       Ask your doctor if you should have a mammogram.      Have a colonoscopy (test for colon cancer) if someone in your family has had colon cancer or polyps before age 50.       Have a cholesterol test every 5 years.       Have a diabetes test (fasting glucose) after age 45. If you are at risk for diabetes, you should have this test every 3 years.    Shots: Get a flu shot each year. Get a tetanus shot every 10 years.     Nutrition:     Eat at least 5 servings of fruits and vegetables each day.    Eat whole-grain bread, whole-wheat pasta and brown rice instead of white grains and rice.    Talk to your provider about Calcium and Vitamin D.     Lifestyle    Exercise at least 150 minutes a week (an average of 30 minutes a day, 5 days a week). This will help you control your weight and prevent disease.    Limit alcohol to one drink per day.    No smoking.     Wear sunscreen to prevent skin cancer.    See  "your dentist every six months for an exam and cleaning.    Discharged by MAGO Ashby            Follow-ups after your visit        Follow-up notes from your care team     Return if symptoms worsen or fail to improve.      Who to contact     Normal or non-critical lab and imaging results will be communicated to you by [employer], letter or phone within 4 business days after the clinic has received the results. If you do not hear from us within 7 days, please contact the clinic through [employer] or phone. If you have a critical or abnormal lab result, we will notify you by phone as soon as possible.  Submit refill requests through [employer] or call your pharmacy and they will forward the refill request to us. Please allow 3 business days for your refill to be completed.          If you need to speak with a  for additional information , please call: [phone]             Additional Information About Your Visit        [employer] Information     [employer] gives you secure access to your electronic health record. If you see a primary care provider, you can also send messages to your care team and make appointments. If you have questions, please call your primary care clinic.  If you do not have a primary care provider, please call [phone] and they will assist you.        Care EveryWhere ID     This is your Care EveryWhere ID. This could be used by other organizations to access your [place] medical records  [id number]        Your Vitals Were     Pulse Temperature Respirations Height Last Period Pulse Oximetry    77 98.3  F (36.8  C) (Oral) 16 5' 3\" (1.6 m) 12/18/2017 100%    Breastfeeding? BMI (Body Mass Index)                No 30.47 kg/m2           Blood Pressure from Last 3 Encounters:   12/21/17 118/82   12/08/17 112/74   11/28/17 127/83    Weight from Last 3 Encounters:   12/21/17 172 lb (78 kg)   12/08/17 168 lb (76.2 kg)   11/28/17 170 lb 6.4 oz (77.3 kg)              We Performed the Following     " Mumps Antibody IgG        Primary Care Provider Office Phone # Fax #    Lily Galindo, [phone]       [address] [place] [place] [address]        Equal Access to Services     JONATHAN SPRINGER : Hadii aad ku hadilyao Soomaali, waaxda luqadaha, qaybta kaalmada adeegyada, waxay idiin hayrosan rhona aguirre laYolisoren farris. So [facility] [phone].    ATENCIÓN: Si habla español, tiene a adams disposición servicios gratuitos de asistencia lingüística. Llame al [phone].    We comply with applicable federal civil rights laws and [place] laws. We do not discriminate on the basis of race, color, national origin, age, disability, sex, sexual orientation, or gender identity.            Thank you!     Thank you for choosing [facility]  for your care. Our goal is always to provide you with excellent care. Hearing back from our patients is one way we can continue to improve our services. Please take a few minutes to complete the written survey that you may receive in the mail after your visit with us. Thank you!             Your Updated Medication List - Protect others around you: Learn how to safely use, store and throw away your medicines at www.disposemymeds.org.          This list is accurate as of: 12/21/17  9:13 AM.  Always use your most recent med list.                   Brand Name Dispense Instructions for use Diagnosis    cephALEXin 500 MG capsule    KEFLEX    20 capsule    Take 1 capsule (500 mg) by mouth 2 times daily    Foreign body (FB) in soft tissue, Local infection of skin and subcutaneous tissue       ferrous sulfate 142 (45 FE) MG Tbcr    SLO-FE    90 tablet    Take 1 tablet (142 mg) by mouth daily    Anemia, unspecified type       ranitidine 150 MG tablet    ZANTAC    60 tablet    Take 1 tablet (150 mg) by mouth 2 times daily    Gastroesophageal reflux disease, esophagitis presence not specified       Simethicone 180 MG Caps     60 capsule    Take 1 capsule by mouth daily as needed     Flatulence, eructation and gas pain

## 2017-12-22 LAB — MUV IGG SER QL IA: 0.8 AI (ref 0–0.8)

## 2017-12-22 NOTE — PROGRESS NOTES
Terrell Paniagua,    Thank you for your recent office visit.    Here are your recent results.  Some people never concert to full immunity.  I have needed to get a repeat MMR due to not having mumps immunology.  This second test suggests that you need another Mumps vaccine/ MMR.  Please call to be added to the shot schedule for this vaccination.  Sincerely, NAKUL Joseph, FNP-BC      Feel free to contact me via [employer] or call the clinic at [phone].    Sincerely,    NAKUL Joseph, RICKIEP-BC

## 2018-01-12 ENCOUNTER — ALLIED HEALTH/NURSE VISIT (OUTPATIENT)
Dept: NURSING | Facility: CLINIC | Age: 48
End: 2018-01-12
Payer: COMMERCIAL

## 2018-01-12 ENCOUNTER — TELEPHONE (OUTPATIENT)
Dept: FAMILY MEDICINE | Facility: CLINIC | Age: 48
End: 2018-01-12

## 2018-01-12 DIAGNOSIS — Z23 NEED FOR VACCINATION: Primary | ICD-10-CM

## 2018-01-12 PROCEDURE — 90707 MMR VACCINE SC: CPT

## 2018-01-12 PROCEDURE — 90471 IMMUNIZATION ADMIN: CPT

## 2018-01-12 PROCEDURE — 99207 ZZC NO CHARGE NURSE ONLY: CPT

## 2018-01-12 NOTE — TELEPHONE ENCOUNTER
Patient is dropping off a form for will [place] to fill out, once this form is completed the patient would like to pick this up.    Thank you.

## 2018-01-12 NOTE — TELEPHONE ENCOUNTER
Informed patient that she does not show immunity to MMR. Patient understood.       Carlie Anne,[facility]

## 2018-01-12 NOTE — TELEPHONE ENCOUNTER
Patient is seeking a letter regarding MMR declination, to [facility] for review. Patient would like to  this letter when done.

## 2018-01-17 DIAGNOSIS — R76.0 ABNORMAL ANTIBODY TITER: Primary | ICD-10-CM

## 2018-01-17 PROCEDURE — 36415 COLL VENOUS BLD VENIPUNCTURE: CPT | Performed by: INTERNAL MEDICINE

## 2018-01-17 PROCEDURE — 86735 MUMPS ANTIBODY: CPT | Performed by: INTERNAL MEDICINE

## 2018-01-18 LAB — MUV IGG SER QL IA: 0.8 AI (ref 0–0.8)

## 2018-01-29 ENCOUNTER — TELEPHONE (OUTPATIENT)
Dept: FAMILY MEDICINE | Facility: CLINIC | Age: 48
End: 2018-01-29

## 2018-01-29 NOTE — TELEPHONE ENCOUNTER
Patient states she would like her GI referral sent somewhere outside of [place], she did not give a name for the facility or a fax but did give a telephone number. That number is, [phone]. Please advise.     Thank you.

## 2018-02-14 ENCOUNTER — OFFICE VISIT (OUTPATIENT)
Dept: FAMILY MEDICINE | Facility: CLINIC | Age: 48
End: 2018-02-14
Payer: COMMERCIAL

## 2018-02-14 VITALS
HEART RATE: 82 BPM | SYSTOLIC BLOOD PRESSURE: 117 MMHG | WEIGHT: 177 LBS | OXYGEN SATURATION: 99 % | BODY MASS INDEX: 31.35 KG/M2 | TEMPERATURE: 98.2 F | DIASTOLIC BLOOD PRESSURE: 81 MMHG

## 2018-02-14 DIAGNOSIS — T36.95XA ANTIBIOTIC-INDUCED YEAST INFECTION: ICD-10-CM

## 2018-02-14 DIAGNOSIS — B96.89 BACTERIAL VAGINOSIS: Primary | ICD-10-CM

## 2018-02-14 DIAGNOSIS — N76.0 BACTERIAL VAGINOSIS: Primary | ICD-10-CM

## 2018-02-14 DIAGNOSIS — B37.9 ANTIBIOTIC-INDUCED YEAST INFECTION: ICD-10-CM

## 2018-02-14 DIAGNOSIS — N89.8 VAGINAL DISCHARGE: ICD-10-CM

## 2018-02-14 LAB
SPECIMEN SOURCE: ABNORMAL
WET PREP SPEC: ABNORMAL

## 2018-02-14 PROCEDURE — 99213 OFFICE O/P EST LOW 20 MIN: CPT | Performed by: FAMILY MEDICINE

## 2018-02-14 PROCEDURE — 87210 SMEAR WET MOUNT SALINE/INK: CPT | Performed by: FAMILY MEDICINE

## 2018-02-14 RX ORDER — FLUCONAZOLE 150 MG/1
150 TABLET ORAL ONCE
Qty: 1 TABLET | Refills: 0 | Status: SHIPPED | OUTPATIENT
Start: 2018-02-14 | End: 2018-02-14

## 2018-02-14 RX ORDER — METRONIDAZOLE 7.5 MG/G
1 GEL VAGINAL AT BEDTIME
Qty: 70 G | Refills: 0 | Status: SHIPPED | OUTPATIENT
Start: 2018-02-14 | End: 2018-02-19

## 2018-02-14 RX ORDER — METRONIDAZOLE 500 MG/1
500 TABLET ORAL 2 TIMES DAILY
Qty: 14 TABLET | Refills: 0 | Status: SHIPPED | OUTPATIENT
Start: 2018-02-14 | End: 2018-02-14 | Stop reason: ALTCHOICE

## 2018-02-14 NOTE — MR AVS SNAPSHOT
After Visit Summary   2018    Arcelia Liz    MRN: [id number]           Patient Information     Date Of Birth          1970        Visit Information        Provider Department      2018 10:00 AM Simona Aviles MD [facility]        Today's Diagnoses     Bacterial vaginosis    -  1    Vaginal discharge        Antibiotic-induced yeast infection          Care Instructions      Bacterial Vaginosis    You have a vaginal infection called bacterial vaginosis (BV). Both good and bad bacteria are present in a healthy vagina. BV occurs when these bacteria get out of balance. The number of bad bacteria increase. And the number of good bacteria decrease.  BV may or may not cause symptoms. If symptoms do occur, they can include:    Thin, gray, milky-white, or sometimes green discharge    Unpleasant odor or  fishy  smell    Itching, burning, or pain in or around the vagina  It is not known what causes BV, but certain factors can make the problem more likely. This can include:    Douching    Having sex with a new partner    Having sex with more than one partner  BV will sometimes go away on its own. But treatment is usually recommended. This is because untreated BV can increase the risk of more serious health problems such as:    Pelvic inflammatory disease (PID)     delivery (giving birth to a baby early if you re pregnant)    HIV and certain other sexually transmitted diseases (STDs)    Infection after surgery on the reproductive organs  Home care  General care    BV is most often treated with medicines called antibiotics. These may be given as pills or as a vaginal cream. If antibiotics are prescribed, be sure to use them exactly as directed. Also, be sure to complete all of the medicine, even if your symptoms go away.    Avoid douching or having sex during treatment.    If you have sex with a female partner, ask your healthcare provider if she should also be  treated.  Prevention    Limit or avoid douching.    Avoid having sex. If you do have sex, then take steps to lower your risk:    Use condoms when having sex.    Limit the number of partners you have sex with.  Follow-up care  Follow up with your healthcare provider, or as advised.  When to seek medical advice  Call your healthcare provider right away if:    You have a fever of 100.4 F (38 C) or higher, or as directed by your provider.    Your symptoms worsen, or they don t go away within a few days of starting treatment.    You have new pain in the lower belly or pelvic region.    You have side effects that bother you or a reaction to the pills or cream you re prescribed.    You or any partners you have sex with have new symptoms, such as a rash, joint pain, or sores.  Date Last Reviewed: 7/30/2015 2000-2017 [employer]. [address] [place] [address]. All rights reserved. This information is not intended as a substitute for professional medical care. Always follow your healthcare professional's instructions.                Follow-ups after your visit        Follow-up notes from your care team     Return if symptoms worsen or fail to improve.      Who to contact     Normal or non-critical lab and imaging results will be communicated to you by [employer], letter or phone within 4 business days after the clinic has received the results. If you do not hear from us within 7 days, please contact the clinic through [employer] or phone. If you have a critical or abnormal lab result, we will notify you by phone as soon as possible.  Submit refill requests through [employer] Information or call your pharmacy and they will forward the refill request to us. Please allow 3 business days for your refill to be completed.          If you need to speak with a  for additional information , please call: [phone]             Additional Information About Your Visit        Sun[employer] Information Information     [employer] Information  gives you secure access to your electronic health record. If you see a primary care provider, you can also send messages to your care team and make appointments. If you have questions, please call your primary care clinic.  If you do not have a primary care provider, please call [phone] and they will assist you.        Care EveryWhere ID     This is your Care EveryWhere ID. This could be used by other organizations to access your [place] medical records  [id number]        Your Vitals Were     Pulse Temperature Last Period Pulse Oximetry Breastfeeding? BMI (Body Mass Index)    82 98.2  F (36.8  C) (Tympanic) 01/15/2018 (Exact Date) 99% No 31.35 kg/m2       Blood Pressure from Last 3 Encounters:   02/14/18 117/81   12/21/17 118/82   12/08/17 112/74    Weight from Last 3 Encounters:   02/14/18 177 lb (80.3 kg)   12/21/17 172 lb (78 kg)   12/08/17 168 lb (76.2 kg)              We Performed the Following     Wet prep          Today's Medication Changes          These changes are accurate as of 2/14/18 10:38 AM.  If you have any questions, ask your nurse or doctor.               Start taking these medicines.        Dose/Directions    fluconazole 150 MG tablet   Commonly known as:  DIFLUCAN   Used for:  Antibiotic-induced yeast infection   Started by:  Simona Aivles MD        Dose:  150 mg   Take 1 tablet (150 mg) by mouth once for 1 dose   Quantity:  1 tablet   Refills:  0       metroNIDAZOLE 500 MG tablet   Commonly known as:  FLAGYL   Used for:  Bacterial vaginosis   Started by:  Simona Aviles MD        Dose:  500 mg   Take 1 tablet (500 mg) by mouth 2 times daily for 7 days   Quantity:  14 tablet   Refills:  0            Where to get your medicines      These medications were sent to [facility] - [address]  [address] [address] [place] [address]     Phone:  [phone]     fluconazole 150 MG tablet    metroNIDAZOLE 500 MG tablet                Primary Care  Provider Office Phone # Fax #    Lily Galindo [phone] [address] [facility] [address]        Equal Access to Services     TANYA SPRINGER : Hadii aad ku hadilyao Soomaali, waaxda luqadaha, qaybta kaalmada adeegyada, israel aguirre laYolisoren farris. So [facility] [phone].    ATENCIÓN: Si habla español, tiene a adams disposición servicios gratuitos de asistencia lingüística. Llame al [phone].    We comply with applicable federal civil rights laws and [place] laws. We do not discriminate on the basis of race, color, national origin, age, disability, sex, sexual orientation, or gender identity.            Thank you!     Thank you for choosing [facility]  for your care. Our goal is always to provide you with excellent care. Hearing back from our patients is one way we can continue to improve our services. Please take a few minutes to complete the written survey that you may receive in the mail after your visit with us. Thank you!             Your Updated Medication List - Protect others around you: Learn how to safely use, store and throw away your medicines at www.disposemymeds.org.          This list is accurate as of 2/14/18 10:38 AM.  Always use your most recent med list.                   Brand Name Dispense Instructions for use Diagnosis    ferrous sulfate 142 (45 FE) MG Tbcr    SLO-FE    90 tablet    Take 1 tablet (142 mg) by mouth daily    Anemia, unspecified type       fluconazole 150 MG tablet    DIFLUCAN    1 tablet    Take 1 tablet (150 mg) by mouth once for 1 dose    Antibiotic-induced yeast infection       metroNIDAZOLE 500 MG tablet    FLAGYL    14 tablet    Take 1 tablet (500 mg) by mouth 2 times daily for 7 days    Bacterial vaginosis

## 2018-02-14 NOTE — PATIENT INSTRUCTIONS
Bacterial Vaginosis    You have a vaginal infection called bacterial vaginosis (BV). Both good and bad bacteria are present in a healthy vagina. BV occurs when these bacteria get out of balance. The number of bad bacteria increase. And the number of good bacteria decrease.  BV may or may not cause symptoms. If symptoms do occur, they can include:    Thin, gray, milky-white, or sometimes green discharge    Unpleasant odor or  fishy  smell    Itching, burning, or pain in or around the vagina  It is not known what causes BV, but certain factors can make the problem more likely. This can include:    Douching    Having sex with a new partner    Having sex with more than one partner  BV will sometimes go away on its own. But treatment is usually recommended. This is because untreated BV can increase the risk of more serious health problems such as:    Pelvic inflammatory disease (PID)     delivery (giving birth to a baby early if you re pregnant)    HIV and certain other sexually transmitted diseases (STDs)    Infection after surgery on the reproductive organs  Home care  General care    BV is most often treated with medicines called antibiotics. These may be given as pills or as a vaginal cream. If antibiotics are prescribed, be sure to use them exactly as directed. Also, be sure to complete all of the medicine, even if your symptoms go away.    Avoid douching or having sex during treatment.    If you have sex with a female partner, ask your healthcare provider if she should also be treated.  Prevention    Limit or avoid douching.    Avoid having sex. If you do have sex, then take steps to lower your risk:    Use condoms when having sex.    Limit the number of partners you have sex with.  Follow-up care  Follow up with your healthcare provider, or as advised.  When to seek medical advice  Call your healthcare provider right away if:    You have a fever of 100.4 F (38 C) or higher, or as directed by your  provider.    Your symptoms worsen, or they don t go away within a few days of starting treatment.    You have new pain in the lower belly or pelvic region.    You have side effects that bother you or a reaction to the pills or cream you re prescribed.    You or any partners you have sex with have new symptoms, such as a rash, joint pain, or sores.  Date Last Reviewed: 7/30/2015 2000-2017 [employer]. [address] [place] [address], [place] [address]. All rights reserved. This information is not intended as a substitute for professional medical care. Always follow your healthcare professional's instructions.

## 2018-02-14 NOTE — PROGRESS NOTES
SUBJECTIVE:   Arcelia Liz is a 47 year old female who presents to clinic today for the following health issues:      Vaginal Symptoms  Onset: Flare of the past x4 days but has had chronic vaginal symptoms- has been seen multiple times for this issue.     Description:  Vaginal Discharge: white, and red   Itching (Pruritis): YES- severe  Burning sensation:  YES  Odor: no     Accompanying Signs & Symptoms:  Pain with Urination: no   Abdominal Pain: no   Fever: no     History:   Sexually active: YES- but has not been since March 2017-  passed away in September.   New Partner: no   Possibility of Pregnancy:  No    Precipitating factors:   Recent Antibiotic Use: no     Alleviating factors:  none    Therapies Tried and outcome: Had leftover Diflucan at home- took 1 dose yesterday- no relief of symptoms.  Also took monistat last night- no relief.     If antibiotic prescribed, would like Fluconazole for antibiotic associated yeast infection.    Problem list and histories reviewed & adjusted, as indicated.  Additional history: as documented    Patient Active Problem List   Diagnosis     CARDIOVASCULAR SCREENING; LDL GOAL LESS THAN 160     Epigastric pain     Mantoux: positive     Inflammatory acne     Comedonal acne     Anemia     Past Surgical History:   Procedure Laterality Date     COLONOSCOPY  9/6/2012    Procedure: COLONOSCOPY;  COLONOSCOPY, CHRONIC LOWER ABD PAIN;  Surgeon: Jordan Mitchell MD;  Location: [facility] OR     DILATION AND CURETTAGE  2005    MAB, twin gestation       Social History   Substance Use Topics     Smoking status: Never Smoker     Smokeless tobacco: Never Used      Comment: Lives in smoke free household     Alcohol use No     Family History   Problem Relation Age of Onset     Asthma Father      DIABETES Father      Glaucoma No family hx of      Macular Degeneration No family hx of      Thyroid Disease No family hx of      CEREBROVASCULAR DISEASE No family hx of      CANCER No family hx  of      Hypertension No family hx of      C.A.D. No family hx of      Lupus No family hx of      Thrombophilia No family hx of      Psoriasis No family hx of      Eczema No family hx of      Melanoma No family hx of          Current Outpatient Prescriptions   Medication Sig Dispense Refill     ferrous sulfate (SLO-FE) 142 (45 FE) MG TBCR Take 1 tablet (142 mg) by mouth daily 90 tablet 1     Allergies   Allergen Reactions     Nkda [No Known Drug Allergies]        Reviewed and updated as needed this visit by clinical staff       Reviewed and updated as needed this visit by Provider         ROS:  Constitutional, HEENT, cardiovascular, pulmonary, gi and gu systems are negative, except as otherwise noted.    OBJECTIVE:     /81  Pulse 82  Temp 98.2  F (36.8  C) (Tympanic)  Wt 177 lb (80.3 kg)  LMP 01/15/2018 (Exact Date)  SpO2 99%  Breastfeeding? No  BMI 31.35 kg/m2  Body mass index is 31.35 kg/(m^2).  GENERAL: healthy, alert and no distress   (female): normal female external genitalia, normal urethral meatus, erythematous vaginal mucosa, normal cervix/adnexa/uterus without masses. Whitish vaginal discharge    Diagnostic Test Results:  Reviewed and discussed with patient prior to discharge.  Results for orders placed or performed in visit on 02/14/18   Wet prep   Result Value Ref Range    Specimen Description Vagina     Wet Prep No Trichomonas seen     Wet Prep No yeast seen     Wet Prep Clue cells seen (A)          ASSESSMENT/PLAN:     Arcelia was seen today for vaginal problem.    Diagnoses and all orders for this visit:    Bacterial vaginosis  -     Discontinue: metroNIDAZOLE (FLAGYL) 500 MG tablet; Take 1 tablet (500 mg) by mouth 2 times daily for 7 days- patient concerned about possible cancer causes;  recently passed away. Would like to try the metrogel  -     metroNIDAZOLE (METROGEL) 0.75 % vaginal gel; Place 1 applicator (5 g) vaginally At Bedtime for 5 days    Vaginal discharge  -     Wet  prep    Antibiotic-induced yeast infection  -     fluconazole (DIFLUCAN) 150 MG tablet; Take 1 tablet (150 mg) by mouth once for 1 dose      Patient education and Handout given       Follow up if symptoms fail to improve or worsen.      The patient was in agreement with the plan today and had no questions or concerns prior to leaving the clinic.        Simona Aviles MD  [facility]

## 2018-02-22 ENCOUNTER — ALLIED HEALTH/NURSE VISIT (OUTPATIENT)
Dept: NURSING | Facility: CLINIC | Age: 48
End: 2018-02-22
Payer: COMMERCIAL

## 2018-02-22 DIAGNOSIS — Z23 NEED FOR VACCINATION: Primary | ICD-10-CM

## 2018-02-22 PROCEDURE — 90707 MMR VACCINE SC: CPT

## 2018-02-22 PROCEDURE — 99207 ZZC NO CHARGE NURSE ONLY: CPT

## 2018-02-22 PROCEDURE — 90471 IMMUNIZATION ADMIN: CPT

## 2018-02-22 NOTE — MR AVS SNAPSHOT
After Visit Summary   2/22/2018    Arcelia Liz    MRN: [id number]           Patient Information     Date Of Birth          1970        Visit Information        Provider Department      2/22/2018 9:00 AM BE ANCILLARY [facility] [place]        Today's Diagnoses     Need for vaccination    -  1       Follow-ups after your visit        Who to contact     If you have questions or need follow up information about today's clinic visit or your schedule please contact [facility] directly at [phone].  Normal or non-critical lab and imaging results will be communicated to you by MyChart, letter or phone within 4 business days after the clinic has received the results. If you do not hear from us within 7 days, please contact the clinic through [employer] or phone. If you have a critical or abnormal lab result, we will notify you by phone as soon as possible.  Submit refill requests through [employer] or call your pharmacy and they will forward the refill request to us. Please allow 3 business days for your refill to be completed.          Additional Information About Your Visit        MyChart Information     [employer] gives you secure access to your electronic health record. If you see a primary care provider, you can also send messages to your care team and make appointments. If you have questions, please call your primary care clinic.  If you do not have a primary care provider, please call [phone] and they will assist you.        Care EveryWhere ID     This is your Care EveryWhere ID. This could be used by other organizations to access your [place] medical records  [id number]         Blood Pressure from Last 3 Encounters:   02/14/18 117/81   12/21/17 118/82   12/08/17 112/74    Weight from Last 3 Encounters:   02/14/18 177 lb (80.3 kg)   12/21/17 172 lb (78 kg)   12/08/17 168 lb (76.2 kg)              We Performed the Following     MMR VIRUS IMMUNIZATION, SUBCUT     VACCINE  ADMINISTRATION, INITIAL        Primary Care Provider Office Phone # Fax #    Lily Galindo [phone]       [address] [facility] [address]        Equal Access to Services     TANYA SPRINGER : Hadii elizabet ku hadilyao Soomaali, waaxda luqadaha, qaybta kaalmada adeegyada, waxeve idiin jerin rhona aguirre laslade farris. So [facility] [phone].    ATENCIÓN: Si habla español, tiene a adams disposición servicios gratuitos de asistencia lingüística. Llame al [phone].    We comply with applicable federal civil rights laws and [place] laws. We do not discriminate on the basis of race, color, national origin, age, disability, sex, sexual orientation, or gender identity.            Thank you!     Thank you for choosing [facility]  for your care. Our goal is always to provide you with excellent care. Hearing back from our patients is one way we can continue to improve our services. Please take a few minutes to complete the written survey that you may receive in the mail after your visit with us. Thank you!             Your Updated Medication List - Protect others around you: Learn how to safely use, store and throw away your medicines at www.disposemymeds.org.          This list is accurate as of 2/22/18  9:13 AM.  Always use your most recent med list.                   Brand Name Dispense Instructions for use Diagnosis    ferrous sulfate 142 (45 FE) MG Tbcr    SLO-FE    90 tablet    Take 1 tablet (142 mg) by mouth daily    Anemia, unspecified type

## 2018-02-22 NOTE — NURSING NOTE
Screening Questionnaire for Adult Immunization    Are you sick today?   No   Do you have allergies to medications, food, a vaccine component or latex?   No   Have you ever had a serious reaction after receiving a vaccination?   No   Do you have a long-term health problem with heart disease, lung disease, asthma, kidney disease, metabolic disease (e.g. diabetes), anemia, or other blood disorder?   No   Do you have cancer, leukemia, HIV/AIDS, or any other immune system problem?   No   In the past 3 months, have you taken medications that affect  your immune system, such as prednisone, other steroids, or anticancer drugs; drugs for the treatment of rheumatoid arthritis, Crohn s disease, or psoriasis; or have you had radiation treatments?   No   Have you had a seizure, or a brain or other nervous system problem?   No   During the past year, have you received a transfusion of blood or blood     products, or been given immune (gamma) globulin or antiviral drug?   No   For women: Are you pregnant or is there a chance you could become        pregnant during the next month?   No   Have you received any vaccinations in the past 4 weeks?   No     Immunization questionnaire answers were all negative.        Per orders of Lily Galindo, injection of MMR given by Madeline Marsh. Patient instructed to remain in clinic for 15 minutes afterwards, and to report any adverse reaction to me immediately.       Screening performed by Madeline Marsh on 2/22/2018 at 9:11 AM.

## 2018-03-13 ENCOUNTER — TRANSFERRED RECORDS (OUTPATIENT)
Dept: HEALTH INFORMATION MANAGEMENT | Facility: CLINIC | Age: 48
End: 2018-03-13

## 2018-04-11 ENCOUNTER — TELEPHONE (OUTPATIENT)
Dept: FAMILY MEDICINE | Facility: CLINIC | Age: 48
End: 2018-04-11

## 2018-04-11 NOTE — TELEPHONE ENCOUNTER
Patient is calling to request copy of her after visit summary dated 12/08/17, please call to advise when ready to be picked up at . Thank you.

## 2018-05-23 ENCOUNTER — OFFICE VISIT (OUTPATIENT)
Dept: INTERNAL MEDICINE | Facility: CLINIC | Age: 48
End: 2018-05-23
Payer: COMMERCIAL

## 2018-05-23 VITALS
DIASTOLIC BLOOD PRESSURE: 82 MMHG | SYSTOLIC BLOOD PRESSURE: 120 MMHG | BODY MASS INDEX: 31.18 KG/M2 | WEIGHT: 176 LBS | HEART RATE: 102 BPM | TEMPERATURE: 97.6 F | RESPIRATION RATE: 16 BRPM

## 2018-05-23 DIAGNOSIS — L91.8 ACROCHORDON: Primary | ICD-10-CM

## 2018-05-23 PROCEDURE — 17110 DESTRUCTION B9 LES UP TO 14: CPT | Performed by: INTERNAL MEDICINE

## 2018-05-23 NOTE — MR AVS SNAPSHOT
After Visit Summary   5/23/2018    Arcelia Liz    MRN: [id number]           Patient Information     Date Of Birth          1970        Visit Information        Provider Department      5/23/2018 5:00 PM [facility]        Today's Diagnoses     Acrochordon    -  1       Follow-ups after your visit        Follow-up notes from your care team     Return if symptoms worsen or fail to improve.      Who to contact     Normal or non-critical lab and imaging results will be communicated to you by [employer], letter or phone within 4 business days after the clinic has received the results. If you do not hear from us within 7 days, please contact the clinic through [employer] or phone. If you have a critical or abnormal lab result, we will notify you by phone as soon as possible.  Submit refill requests through [employer] or call your pharmacy and they will forward the refill request to us. Please allow 3 business days for your refill to be completed.          If you need to speak with a  for additional information , please call: [phone]             Additional Information About Your Visit        MyChart Information     [employer] gives you secure access to your electronic health record. If you see a primary care provider, you can also send messages to your care team and make appointments. If you have questions, please call your primary care clinic.  If you do not have a primary care provider, please call [phone] and they will assist you.        Care EveryWhere ID     This is your Care EveryWhere ID. This could be used by other organizations to access your [place] medical records  [id number]        Your Vitals Were     Pulse Temperature Respirations BMI (Body Mass Index)          102 97.6  F (36.4  C) (Tympanic) 16 31.18 kg/m2         Blood Pressure from Last 3 Encounters:   05/23/18 120/82   02/14/18 117/81   12/21/17 118/82    Weight from Last 3 Encounters:    05/23/18 176 lb (79.8 kg)   02/14/18 177 lb (80.3 kg)   12/21/17 172 lb (78 kg)              We Performed the Following     DESTRUCT BENIGN LESION, UP TO 14        Primary Care Provider Office Phone # Fax #    Lily Galindo [phone]       [address] [facility] [address]        Equal Access to Services     [facility]: Hadii aad ku hadasho Soomaali, waaxda luqadaha, qaybta kaalmada adeegyada, waxay idiin hayaan adeeg kharash la'aan . So [facility] [phone].    ATENCIÓN: Si habla español, tiene a adams disposición servicios gratuitos de asistencia lingüística. Llame al [phone].    We comply with applicable federal civil rights laws and [place] laws. We do not discriminate on the basis of race, color, national origin, age, disability, sex, sexual orientation, or gender identity.            Thank you!     Thank you for choosing [facility]  for your care. Our goal is always to provide you with excellent care. Hearing back from our patients is one way we can continue to improve our services. Please take a few minutes to complete the written survey that you may receive in the mail after your visit with us. Thank you!             Your Updated Medication List - Protect others around you: Learn how to safely use, store and throw away your medicines at www.disposemymeds.org.          This list is accurate as of 5/23/18  5:39 PM.  Always use your most recent med list.                   Brand Name Dispense Instructions for use Diagnosis    ferrous sulfate 142 (45 Fe) MG Tbcr    SLO-FE    90 tablet    Take 1 tablet (142 mg) by mouth daily    Anemia, unspecified type

## 2018-05-23 NOTE — PROGRESS NOTES
SUBJECTIVE:   Arcelia Liz is a 48 year old female who presents to clinic today for the following health issues:      Skin tag removal  Left outer cheek area  Was removed once has grown back    1. Acrochordon      ROS otherwise negative     OBJECTIVE:                                                    /82  Pulse 102  Temp 97.6  F (36.4  C) (Tympanic)  Resp 16  Wt 176 lb (79.8 kg)  BMI 31.18 kg/m2    Cryotherapy attempted on 3mm stalk of 5mm acrochordon on left lateral face with approx 20 second global freeze time. Patient tolerated well.    Results for orders placed or performed in visit on 02/14/18   Wet prep   Result Value Ref Range    Specimen Description Vagina     Wet Prep No Trichomonas seen     Wet Prep No yeast seen     Wet Prep Clue cells seen (A)       ASSESSMENT/PLAN:                                                    1. Acrochordon  Discussed possible resolutions including incomplete treatment and need for return for complete treatment.  Patient agreed with plan and demonstrated understanding to contact us for help if not improving or sooner if worsening or if other questions or concerns arise.  - DESTRUCT BENIGN LESION, UP TO 14       Orders Placed This Encounter     DESTRUCT BENIGN LESION, UP TO 14              Martín Gonsalez MD

## 2018-08-09 ENCOUNTER — OFFICE VISIT (OUTPATIENT)
Dept: FAMILY MEDICINE | Facility: CLINIC | Age: 48
End: 2018-08-09
Payer: COMMERCIAL

## 2018-08-09 VITALS
DIASTOLIC BLOOD PRESSURE: 88 MMHG | TEMPERATURE: 97.8 F | WEIGHT: 175.6 LBS | SYSTOLIC BLOOD PRESSURE: 128 MMHG | HEART RATE: 85 BPM | RESPIRATION RATE: 20 BRPM | OXYGEN SATURATION: 99 % | BODY MASS INDEX: 31.11 KG/M2

## 2018-08-09 DIAGNOSIS — R10.84 ABDOMINAL PAIN, GENERALIZED: Primary | ICD-10-CM

## 2018-08-09 PROCEDURE — 99213 OFFICE O/P EST LOW 20 MIN: CPT | Performed by: PHYSICIAN ASSISTANT

## 2018-08-09 NOTE — PATIENT INSTRUCTIONS
If your pain persists and your H pylori is negative we'll further discuss imaging of your abdomen.

## 2018-08-09 NOTE — MR AVS SNAPSHOT
After Visit Summary   8/9/2018    Arcelia Liz    MRN: [id number]           Patient Information     Date Of Birth          1970        Visit Information        Provider Department      8/9/2018 8:20 AM Maria Baldwin PA-C [facility] [place]        Today's Diagnoses     Abdominal pain, generalized    -  1      Care Instructions    If your pain persists and your H pylori is negative we'll further discuss imaging of your abdomen.          Follow-ups after your visit        Future tests that were ordered for you today     Open Future Orders        Priority Expected Expires Ordered    H Pylori antigen stool Routine  9/8/2018 8/9/2018            Who to contact     Normal or non-critical lab and imaging results will be communicated to you by Sava Transmediahart, letter or phone within 4 business days after the clinic has received the results. If you do not hear from us within 7 days, please contact the clinic through [employer] or phone. If you have a critical or abnormal lab result, we will notify you by phone as soon as possible.  Submit refill requests through [employer] or call your pharmacy and they will forward the refill request to us. Please allow 3 business days for your refill to be completed.          If you need to speak with a  for additional information , please call: [phone]             Additional Information About Your Visit        Sava [employer]media[employer]BD Displays Information     [employer] gives you secure access to your electronic health record. If you see a primary care provider, you can also send messages to your care team and make appointments. If you have questions, please call your primary care clinic.  If you do not have a primary care provider, please call [phone] and they will assist you.        Care EveryWhere ID     This is your Care EveryWhere ID. This could be used by other organizations to access your [place] medical records  [id number]        Your Vitals Were     Pulse  Temperature Respirations Pulse Oximetry BMI (Body Mass Index)       85 97.8  F (36.6  C) (Tympanic) 20 99% 31.11 kg/m2        Blood Pressure from Last 3 Encounters:   08/09/18 128/88   05/23/18 120/82   02/14/18 117/81    Weight from Last 3 Encounters:   08/09/18 175 lb 9.6 oz (79.7 kg)   05/23/18 176 lb (79.8 kg)   02/14/18 177 lb (80.3 kg)               Primary Care Provider Office Phone # Fax #    Lily Galindo, [phone]       [address] [facility] [address]        Equal Access to Services     [facility]: Hadii elizabet burleson hadasho Soomaali, waaxda luqadaha, qaybta kaalmada adeegyada, israel reynolds . So [facility] [phone].    ATENCIÓN: Si habla español, tiene a adams disposición servicios gratuitos de asistencia lingüística. Ll[facility] [place] [phone].    We comply with applicable federal civil rights laws and [place] laws. We do not discriminate on the basis of race, color, national origin, age, disability, sex, sexual orientation, or gender identity.            Thank you!     Thank you for choosing [facility]  for your care. Our goal is always to provide you with excellent care. Hearing back from our patients is one way we can continue to improve our services. Please take a few minutes to complete the written survey that you may receive in the mail after your visit with us. Thank you!             Your Updated Medication List - Protect others around you: Learn how to safely use, store and throw away your medicines at www.disposemymeds.org.          This list is accurate as of 8/9/18  8:34 AM.  Always use your most recent med list.                   Brand Name Dispense Instructions for use Diagnosis    ferrous sulfate 142 (45 Fe) MG Tbcr    SLO-FE    90 tablet    Take 1 tablet (142 mg) by mouth daily    Anemia, unspecified type

## 2018-08-09 NOTE — PROGRESS NOTES
SUBJECTIVE:   Arcelia Liz is a 48 year old female who presents to clinic today for the following health issues:      Abdominal Pain      Duration: x1wk    Description (location/character/radiation): right side of abdomin        Associated flank pain: None    Intensity:  mild    Accompanying signs and symptoms:        Fever/Chills: no        Gas/Bloating: YES- both       Nausea/vomitting: no        Diarrhea: no        Dysuria or Hematuria: no     History (previous similar pain/trauma/previous testing): yes- positive for H.pylori 10/23/17    Precipitating or alleviating factors:       Pain worse with eating/BM/urination: none        Pain relieved by BM: no     Therapies tried and outcome: None    LMP:  07/18/2018      PROBLEMS TO ADD ON...  None  -------------------------------------    Problem list and histories reviewed & adjusted, as indicated.  Additional history: as documented    Patient Active Problem List   Diagnosis     CARDIOVASCULAR SCREENING; LDL GOAL LESS THAN 160     Epigastric pain     Mantoux: positive     Inflammatory acne     Comedonal acne     Anemia     Past Surgical History:   Procedure Laterality Date     COLONOSCOPY  9/6/2012    Procedure: COLONOSCOPY;  COLONOSCOPY, CHRONIC LOWER ABD PAIN;  Surgeon: Jordan Mitchell MD;  Location: [facility] OR     DILATION AND CURETTAGE  2005    MAB, twin gestation       Social History   Substance Use Topics     Smoking status: Never Smoker     Smokeless tobacco: Never Used      Comment: Lives in smoke free household     Alcohol use No     Family History   Problem Relation Age of Onset     Asthma Father      Diabetes Father      Glaucoma No family hx of      Macular Degeneration No family hx of      Thyroid Disease No family hx of      Cerebrovascular Disease No family hx of      Cancer No family hx of      Hypertension No family hx of      C.A.D. No family hx of      Lupus No family hx of      Thrombophilia No family hx of      Psoriasis No family hx of       Eczema No family hx of      Melanoma No family hx of            Reviewed and updated as needed this visit by clinical staff       Reviewed and updated as needed this visit by Provider         ROS:  Constitutional, gi and gu systems are negative, except as otherwise noted.    OBJECTIVE:                                                    /88  Pulse 85  Temp 97.8  F (36.6  C) (Tympanic)  Resp 20  Wt 175 lb 9.6 oz (79.7 kg)  SpO2 99%  BMI 31.11 kg/m2  Body mass index is 31.11 kg/(m^2).  GENERAL APPEARANCE: healthy, alert and no distress  ABDOMEN: soft, nontender, without hepatosplenomegaly or masses and bowel sounds normal  MS: extremities normal- no gross deformities noted  PSYCH: mentation appears normal and affect normal/bright       ASSESSMENT:                                                      1. Abdominal pain, generalized         PLAN:                                                    Patient would like to be tested for H pylori as she has had this in the past. She has a history of constipation, but this has been improved in the last few days.    Patient Instructions   If your pain persists and your H pylori is negative we'll further discuss imaging of your abdomen.     The patient was in agreement with the plan today and had no questions or concerns prior to leaving the clinic.     Maria Baldwin PA-C  [facility]

## 2018-08-10 DIAGNOSIS — R10.84 ABDOMINAL PAIN, GENERALIZED: ICD-10-CM

## 2018-08-10 PROCEDURE — 87338 HPYLORI STOOL AG IA: CPT | Performed by: PHYSICIAN ASSISTANT

## 2018-08-13 ENCOUNTER — TELEPHONE (OUTPATIENT)
Dept: FAMILY MEDICINE | Facility: CLINIC | Age: 48
End: 2018-08-13

## 2018-08-13 DIAGNOSIS — R10.84 ABDOMINAL PAIN, GENERALIZED: Primary | ICD-10-CM

## 2018-08-13 LAB
H PYLORI AG STL QL IA: NORMAL
SPECIMEN SOURCE: NORMAL

## 2018-08-14 ENCOUNTER — RADIANT APPOINTMENT (OUTPATIENT)
Dept: CT IMAGING | Facility: CLINIC | Age: 48
End: 2018-08-14
Attending: PHYSICIAN ASSISTANT
Payer: COMMERCIAL

## 2018-08-14 DIAGNOSIS — R10.84 ABDOMINAL PAIN, GENERALIZED: ICD-10-CM

## 2018-08-14 PROCEDURE — 74177 CT ABD & PELVIS W/CONTRAST: CPT | Mod: TC

## 2018-08-14 RX ORDER — IOPAMIDOL 755 MG/ML
100 INJECTION, SOLUTION INTRAVASCULAR ONCE
Status: COMPLETED | OUTPATIENT
Start: 2018-08-14 | End: 2018-08-14

## 2018-08-14 RX ADMIN — IOPAMIDOL 85 ML: 755 INJECTION, SOLUTION INTRAVASCULAR at 16:14

## 2018-08-14 RX ADMIN — Medication 71 ML: at 16:14

## 2018-08-14 NOTE — TELEPHONE ENCOUNTER
Notes Recorded by Maria Baldwin PA-C on 8/13/2018 at 2:20 PM  Arcelia,    Your H pylori result came back negative.       Patient informed of result. Patient requesting order for imaging.

## 2018-08-20 ENCOUNTER — TELEPHONE (OUTPATIENT)
Dept: FAMILY MEDICINE | Facility: CLINIC | Age: 48
End: 2018-08-20

## 2018-08-20 DIAGNOSIS — R10.84 ABDOMINAL PAIN, GENERALIZED: Primary | ICD-10-CM

## 2018-08-22 NOTE — TELEPHONE ENCOUNTER
Spoke with patient informed GI referral placed. She requested referral to be mailed to her and sent on her my chart. Done

## 2018-09-26 ENCOUNTER — TELEPHONE (OUTPATIENT)
Dept: FAMILY MEDICINE | Facility: CLINIC | Age: 48
End: 2018-09-26

## 2018-09-26 NOTE — TELEPHONE ENCOUNTER
Patient states she will not be going for an office visit, she wants the procedure. Please call the Patient. Thank You.

## 2018-09-27 NOTE — TELEPHONE ENCOUNTER
I wouldn't order a colonoscopy for generalized abdominal pain... This can be ordered for colon cancer screening, however. I feel she should follow up with GI if her abdominal pain has not improved

## 2018-09-27 NOTE — TELEPHONE ENCOUNTER
Gave pt the below message from Maria, pt verbalized understanding in following up with GI specialist for abdominal pain. Pt ended call before I could verify if she was going to follow up with the GI specialist or not.

## 2018-09-27 NOTE — TELEPHONE ENCOUNTER
Spoke with pt, she does not want to go to the GI specialist again for an appt, she just saw the specialist in March, she was referred by Lily. Please see encounter from 3/13/18.  Pt states she has been following the outlined plan that was given to her and its not helping. She would prefer to have a procedure done, the note from GI did state she could have a colonoscopy early as she is almost 49. Please review GI plan.   Procedure only referral pended for approval if appropriate.

## 2018-10-29 ENCOUNTER — OFFICE VISIT (OUTPATIENT)
Dept: FAMILY MEDICINE | Facility: CLINIC | Age: 48
End: 2018-10-29
Payer: COMMERCIAL

## 2018-10-29 VITALS
HEART RATE: 79 BPM | DIASTOLIC BLOOD PRESSURE: 81 MMHG | OXYGEN SATURATION: 99 % | TEMPERATURE: 97 F | WEIGHT: 168 LBS | SYSTOLIC BLOOD PRESSURE: 125 MMHG | HEIGHT: 63 IN | BODY MASS INDEX: 29.77 KG/M2

## 2018-10-29 DIAGNOSIS — D17.30 LIPOMA OF SKIN AND SUBCUTANEOUS TISSUE: Primary | ICD-10-CM

## 2018-10-29 PROCEDURE — 99213 OFFICE O/P EST LOW 20 MIN: CPT | Performed by: FAMILY MEDICINE

## 2018-10-29 NOTE — PROGRESS NOTES
SUBJECTIVE:   Arcelia Liz is a 48 year old female who presents to clinic today for the following health issues:      Mass, soft mobile.  Noticed x2 days ago on left forearm.   Denies any pain or changes in size.     States that her   from gallbladder cancer with mets and patient get's paranoid about new growths.     Problem list and histories reviewed & adjusted, as indicated.  Additional history: as documented    Patient Active Problem List   Diagnosis     CARDIOVASCULAR SCREENING; LDL GOAL LESS THAN 160     Epigastric pain     Mantoux: positive     Inflammatory acne     Comedonal acne     Anemia     Past Surgical History:   Procedure Laterality Date     COLONOSCOPY  2012    Procedure: COLONOSCOPY;  COLONOSCOPY, CHRONIC LOWER ABD PAIN;  Surgeon: Jordan Mitchell MD;  Location: [facility] OR     DILATION AND CURETTAGE      MAB, twin gestation       Social History   Substance Use Topics     Smoking status: Never Smoker     Smokeless tobacco: Never Used      Comment: Lives in smoke free household     Alcohol use No     Family History   Problem Relation Age of Onset     Asthma Father      Diabetes Father      Glaucoma No family hx of      Macular Degeneration No family hx of      Thyroid Disease No family hx of      Cerebrovascular Disease No family hx of      Cancer No family hx of      Hypertension No family hx of      C.A.D. No family hx of      Lupus No family hx of      Thrombophilia No family hx of      Psoriasis No family hx of      Eczema No family hx of      Melanoma No family hx of          Current Outpatient Prescriptions   Medication Sig Dispense Refill     ferrous sulfate (SLO-FE) 142 (45 FE) MG TBCR Take 1 tablet (142 mg) by mouth daily 90 tablet 1     Allergies   Allergen Reactions     Nkda [No Known Drug Allergies]        Reviewed and updated as needed this visit by clinical staff       Reviewed and updated as needed this visit by Provider         ROS:  Constitutional, HEENT,  "cardiovascular, pulmonary, gi and gu systems are negative, except as otherwise noted.    OBJECTIVE:     /81  Pulse 79  Temp 97  F (36.1  C) (Tympanic)  Ht 5' 3\" (1.6 m)  Wt 168 lb (76.2 kg)  SpO2 99%  Breastfeeding? No  BMI 29.76 kg/m2  Body mass index is 29.76 kg/(m^2).  GENERAL: healthy, alert and no distress  SKIN: soft, palpable, sub-centimeter mobile mass on the left forearm. Non tender to touch and no overlying skin changes.     Diagnostic Test Results:  none     ASSESSMENT/PLAN:     Arcelia was seen today for mass.    Diagnoses and all orders for this visit:    Lipoma of skin and subcutaneous tissue  Patient education and Handout given  Reassured patient that it's benign fatty growth, can be excised if it's symptomatic.     Schedule a Physical Exam at earliest convenience.       Simona Aviles MD  [facility]  "

## 2018-10-29 NOTE — PATIENT INSTRUCTIONS
Lipoma, No Treatment  A lipoma is a local overgrowth of fatty tissue. It appears as a soft raised area, usually less than 2 inches across. It is a benign condition (not cancer).   Home care  General information regarding lipoma includes:  1. No special care is needed for a lipoma.  2. You can consider removal for cosmetic reasons.  3. Sometimes lipomas are uncomfortable because they put pressure on surrounding tissues. This is also a reason to have a lipoma removed.  Follow-up care  Follow up with your healthcare provider, or as advised if you want to have the lipoma removed at a later time.  When to seek medical advice  Call your healthcare provider right away if any of the following occur:    Redness, pain, tenderness, or drainage from the lipoma    Lipoma begins to enlarge or change shape    Changes in the color of the skin over the lipoma  Date Last Reviewed: 6/1/2016 2000-2017 [employer]. [address] [place] [address]. All rights reserved. This information is not intended as a substitute for professional medical care. Always follow your healthcare professional's instructions.

## 2018-10-29 NOTE — MR AVS SNAPSHOT
After Visit Summary   10/29/2018    Arcelia Liz    MRN: [id number]           Patient Information     Date Of Birth          1970        Visit Information        Provider Department      10/29/2018 2:00 PM Simona Aviles MD [facility] [place]        Today's Diagnoses     Lipoma of skin and subcutaneous tissue    -  1      Care Instructions      Lipoma, No Treatment  A lipoma is a local overgrowth of fatty tissue. It appears as a soft raised area, usually less than 2 inches across. It is a benign condition (not cancer).   Home care  General information regarding lipoma includes:  1. No special care is needed for a lipoma.  2. You can consider removal for cosmetic reasons.  3. Sometimes lipomas are uncomfortable because they put pressure on surrounding tissues. This is also a reason to have a lipoma removed.  Follow-up care  Follow up with your healthcare provider, or as advised if you want to have the lipoma removed at a later time.  When to seek medical advice  Call your healthcare provider right away if any of the following occur:    Redness, pain, tenderness, or drainage from the lipoma    Lipoma begins to enlarge or change shape    Changes in the color of the skin over the lipoma  Date Last Reviewed: 6/1/2016 2000-2017 [employer]. [address] [place] [address]. All rights reserved. This information is not intended as a substitute for professional medical care. Always follow your healthcare professional's instructions.                Follow-ups after your visit        Follow-up notes from your care team     Return for Physical Exam at earliest convenience.      Your next 10 appointments already scheduled     Oct 29, 2018  2:00 PM CDT   Office Visit with MD Jorje [facility] [place] ([facility] [place])    [address]  [place] [address] [phone]           Bring a current list of meds and any records pertaining  "to this visit. For Physicals, please bring immunization records and any forms needing to be filled out. Please arrive 10 minutes early to complete paperwork.              Who to contact     Normal or non-critical lab and imaging results will be communicated to you by Sail[employer]hart, letter or phone within 4 business days after the clinic has received the results. If you do not hear from us within 7 days, please contact the clinic through S[employer]hart or phone. If you have a critical or abnormal lab result, we will notify you by phone as soon as possible.  Submit refill requests through [employer] or call your pharmacy and they will forward the refill request to us. Please allow 3 business days for your refill to be completed.          If you need to speak with a  for additional information , please call: [phone]             Additional Information About Your Visit        [employer] Information     [employer] gives you secure access to your electronic health record. If you see a primary care provider, you can also send messages to your care team and make appointments. If you have questions, please call your primary care clinic.  If you do not have a primary care provider, please call [phone] and they will assist you.        Care EveryWhere ID     This is your Care EveryWhere ID. This could be used by other organizations to access your [place] medical records  [id number]        Your Vitals Were     Pulse Temperature Height Pulse Oximetry Breastfeeding? BMI (Body Mass Index)    79 97  F (36.1  C) (Tympanic) 5' 3\" (1.6 m) 99% No 29.76 kg/m2       Blood Pressure from Last 3 Encounters:   10/29/18 125/81   08/09/18 128/88   05/23/18 120/82    Weight from Last 3 Encounters:   10/29/18 168 lb (76.2 kg)   08/09/18 175 lb 9.6 oz (79.7 kg)   05/23/18 176 lb (79.8 kg)              Today, you had the following     No orders found for display       Primary Care Provider Office Phone # Fax #    Simona Aviles MD " [phone]       [address] [facility] [address]        Equal Access to Services     [facility] : Hadii elizabet burleson ross Sobogdan, waselenada luqadaha, qacarrieta kaalmada brendon, israel farris. So [facility] [phone].    ATENCIÓN: Si habla español, tiene a adams disposición servicios gratuitos de asistencia lingüística. Llame al [phone].    We comply with applicable federal civil rights laws and [place] laws. We do not discriminate on the basis of race, color, national origin, age, disability, sex, sexual orientation, or gender identity.            Thank you!     Thank you for choosing [facility]  for your care. Our goal is always to provide you with excellent care. Hearing back from our patients is one way we can continue to improve our services. Please take a few minutes to complete the written survey that you may receive in the mail after your visit with us. Thank you!             Your Updated Medication List - Protect others around you: Learn how to safely use, store and throw away your medicines at www.disposemymeds.org.          This list is accurate as of 10/29/18  1:37 PM.  Always use your most recent med list.                   Brand Name Dispense Instructions for use Diagnosis    ferrous sulfate 142 (45 Fe) MG Tbcr    SLO-FE    90 tablet    Take 1 tablet (142 mg) by mouth daily    Anemia, unspecified type

## 2019-05-10 ENCOUNTER — ANCILLARY PROCEDURE (OUTPATIENT)
Dept: GENERAL RADIOLOGY | Facility: CLINIC | Age: 49
End: 2019-05-10
Attending: FAMILY MEDICINE
Payer: COMMERCIAL

## 2019-05-10 ENCOUNTER — OFFICE VISIT (OUTPATIENT)
Dept: FAMILY MEDICINE | Facility: CLINIC | Age: 49
End: 2019-05-10
Payer: COMMERCIAL

## 2019-05-10 VITALS
RESPIRATION RATE: 20 BRPM | WEIGHT: 177.8 LBS | DIASTOLIC BLOOD PRESSURE: 88 MMHG | OXYGEN SATURATION: 100 % | HEART RATE: 93 BPM | TEMPERATURE: 98 F | BODY MASS INDEX: 31.5 KG/M2 | SYSTOLIC BLOOD PRESSURE: 132 MMHG

## 2019-05-10 DIAGNOSIS — R07.89 INTERMITTENT LEFT-SIDED CHEST PAIN: Primary | ICD-10-CM

## 2019-05-10 DIAGNOSIS — Z12.31 ENCOUNTER FOR SCREENING MAMMOGRAM FOR BREAST CANCER: ICD-10-CM

## 2019-05-10 LAB
ERYTHROCYTE [DISTWIDTH] IN BLOOD BY AUTOMATED COUNT: 13.8 % (ref 10–15)
HCT VFR BLD AUTO: 39.3 % (ref 35–47)
HGB BLD-MCNC: 12.7 G/DL (ref 11.7–15.7)
MCH RBC QN AUTO: 28.7 PG (ref 26.5–33)
MCHC RBC AUTO-ENTMCNC: 32.3 G/DL (ref 31.5–36.5)
MCV RBC AUTO: 89 FL (ref 78–100)
PLATELET # BLD AUTO: 287 10E9/L (ref 150–450)
RBC # BLD AUTO: 4.42 10E12/L (ref 3.8–5.2)
TROPONIN I SERPL-MCNC: <0.015 UG/L (ref 0–0.04)
WBC # BLD AUTO: 4.7 10E9/L (ref 4–11)

## 2019-05-10 PROCEDURE — 71046 X-RAY EXAM CHEST 2 VIEWS: CPT

## 2019-05-10 PROCEDURE — 36415 COLL VENOUS BLD VENIPUNCTURE: CPT | Performed by: FAMILY MEDICINE

## 2019-05-10 PROCEDURE — 99214 OFFICE O/P EST MOD 30 MIN: CPT | Performed by: FAMILY MEDICINE

## 2019-05-10 PROCEDURE — 85027 COMPLETE CBC AUTOMATED: CPT | Performed by: FAMILY MEDICINE

## 2019-05-10 PROCEDURE — 93000 ELECTROCARDIOGRAM COMPLETE: CPT | Performed by: FAMILY MEDICINE

## 2019-05-10 PROCEDURE — 84484 ASSAY OF TROPONIN QUANT: CPT | Performed by: FAMILY MEDICINE

## 2019-05-10 ASSESSMENT — PATIENT HEALTH QUESTIONNAIRE - PHQ9
5. POOR APPETITE OR OVEREATING: NOT AT ALL
SUM OF ALL RESPONSES TO PHQ QUESTIONS 1-9: 1

## 2019-05-10 ASSESSMENT — ANXIETY QUESTIONNAIRES
2. NOT BEING ABLE TO STOP OR CONTROL WORRYING: NOT AT ALL
1. FEELING NERVOUS, ANXIOUS, OR ON EDGE: MORE THAN HALF THE DAYS
7. FEELING AFRAID AS IF SOMETHING AWFUL MIGHT HAPPEN: NOT AT ALL
IF YOU CHECKED OFF ANY PROBLEMS ON THIS QUESTIONNAIRE, HOW DIFFICULT HAVE THESE PROBLEMS MADE IT FOR YOU TO DO YOUR WORK, TAKE CARE OF THINGS AT HOME, OR GET ALONG WITH OTHER PEOPLE: SOMEWHAT DIFFICULT
6. BECOMING EASILY ANNOYED OR IRRITABLE: NOT AT ALL
GAD7 TOTAL SCORE: 4
3. WORRYING TOO MUCH ABOUT DIFFERENT THINGS: MORE THAN HALF THE DAYS
5. BEING SO RESTLESS THAT IT IS HARD TO SIT STILL: NOT AT ALL

## 2019-05-10 NOTE — PROGRESS NOTES
SUBJECTIVE:   Arcelia Liz is a 49 year old female who presents to clinic today for the following   health issues:        CHEST PAIN     Onset: pain has occurred 2 x in the past x 1 week     Description:   Location:  left side, below the breast   Character: sharp  Radiation: NA  Duration: 10 minutes     Intensity: 6/10    Progression of Symptoms:  same    Accompanying Signs & Symptoms:  Shortness of breath: no  Sweating: no  Nausea/vomiting: no  Lightheadedness: YES  Palpitations: no  Fever/Chills: no  Cough: no  Heartburn: no    History:   Family history of heart disease no  Tobacco use: no    Precipitating factors:   Worse with exertion: YES  Worse with deep breaths :  no  Related to food: unsure.           Has been fasting over the past x 8 days for Ramadan.  Only eating at night, States she usually drinks a lot of coffee/ tea in the morning but has not been able to due to Ramadan.  Also having headaches over the past x1 week.       Alleviating factors:  None        Therapies Tried and outcome: none      States that she has been under stress lately, lost her  and now having to take care of the kids by herself.   Feels like she is coping well and denies having any depression or anxiety.   - PHQ-9/JORDYN 7 completed, see Epic for details      HEALTH CARE MAINTENANCE: Due for screening mammogram.       Additional history: as documented    Reviewed  and updated as needed this visit by clinical staff  Tobacco  Allergies  Meds         Reviewed and updated as needed this visit by Provider         Patient Active Problem List   Diagnosis     CARDIOVASCULAR SCREENING; LDL GOAL LESS THAN 160     Epigastric pain     Mantoux: positive     Inflammatory acne     Comedonal acne     Anemia     Past Surgical History:   Procedure Laterality Date     COLONOSCOPY  9/6/2012    Procedure: COLONOSCOPY;  COLONOSCOPY, CHRONIC LOWER ABD PAIN;  Surgeon: Jordan Mitchell MD;  Location:  OR     DILATION AND CURETTAGE  2005     MAB, twin gestation       Social History     Tobacco Use     Smoking status: Never Smoker     Smokeless tobacco: Never Used     Tobacco comment: Lives in smoke free household   Substance Use Topics     Alcohol use: No     Family History   Problem Relation Age of Onset     Asthma Father      Diabetes Father      Glaucoma No family hx of      Macular Degeneration No family hx of      Thyroid Disease No family hx of      Cerebrovascular Disease No family hx of      Cancer No family hx of      Hypertension No family hx of      C.A.D. No family hx of      Lupus No family hx of      Thrombophilia No family hx of      Psoriasis No family hx of      Eczema No family hx of      Melanoma No family hx of          Current Outpatient Medications   Medication Sig Dispense Refill     ferrous sulfate (SLO-FE) 142 (45 FE) MG TBCR Take 1 tablet (142 mg) by mouth daily 90 tablet 1     Allergies   Allergen Reactions     Nkda [No Known Drug Allergies]        ROS:  Constitutional, HEENT, cardiovascular, pulmonary, gi and gu systems are negative, except as otherwise noted.    OBJECTIVE:     /88   Pulse 93   Temp 98  F (36.7  C) (Tympanic)   Resp 20   Wt 80.6 kg (177 lb 12.8 oz)   LMP 05/02/2019 (Exact Date)   SpO2 100%   Breastfeeding? No   BMI 31.50 kg/m    Body mass index is 31.5 kg/m .  GENERAL: healthy, alert and no distress  EYES: Eyes grossly normal to inspection, PERRL and conjunctivae and sclerae normal  HENT: ear canals and TM's normal, nose and mouth without ulcers or lesions  NECK: no adenopathy, no asymmetry, masses, or scars and thyroid normal to palpation  RESP: lungs clear to auscultation - no rales, rhonchi or wheezes  CV: regular rate and rhythm, normal S1 S2, no S3 or S4, no murmur, click or rub, no peripheral edema and peripheral pulses strong  MS: no gross musculoskeletal defects noted, no edema  PSYCH: mentation appears normal, tearful, judgement and insight intact and appearance well  groomed    Diagnostic Test Results:  Reviewed and discussed with patient prior to discharge.  CXR - negative  EKG: appears normal, NSR, normal axis, normal intervals, no acute ST/T changes c/w ischemia, no LVH by voltage criteria, there are no prior tracings available      ASSESSMENT/PLAN:     Arcelia was seen today for chest pain.    Diagnoses and all orders for this visit:    Intermittent left-sided chest pain, suspect due to costochondritis  -     XR Chest 2 Views  -     EKG 12-lead complete w/read - Clinics  -     CBC with platelets  -     Troponin I    Encounter for screening mammogram for breast cancer  -     *MA Screening Digital Bilateral; Future      Patient education and Handout given       Follow up if symptoms fail to improve in 2 weeks  or worsen.      The patient was in agreement with the plan today and had no questions or concerns prior to leaving the clinic.    Schedule a Physical Exam at earliest convenience.     Simona Aviles MD  [facility]

## 2019-05-10 NOTE — PATIENT INSTRUCTIONS
Patient Education     Costochondritis    Costochondritis is inflammation of a rib or the cartilage that connects a rib to your breastbone (sternum). It causes tenderness, and sometimes chest pain may be sharp or aching, or it may feel like pressure. Pain may get worse with deep breathing, movement, or exercise. In some cases, the pain is mistaken for a heart attack. Despite this, the condition is not serious. Read on to learn more about the condition and how it can be treated.  What causes costochondritis?  The cause of costochondritis is not completely clear, but it may happen after a chest injury, chest infection, or coughing episode. Some physical activities can sometimes lead to costochondritis. Large-breasted women may be more likely to have the condition. Often, the reason for the inflammation is unknown.  Diagnosing costochondritis  There is no test for costochondritis. The condition is diagnosed by the symptoms you have. Your healthcare provider will perform a physical exam. He or she will ask you about your symptoms and examine your chest for tenderness. In some cases, tests are done to rule out more serious problems. These tests may include imaging tests such as chest X-ray, CT scan, or an ECG.  Treating costochondritis  If an underlying cause is found, treatment for that will likely relieve the problem. Costochondritis often goes away on its own. The course of the condition varies from person to person. It usually lasts from weeks to months. In some cases, mild symptoms continue for months to years. To ease symptoms:    Take medicine as directed. These relieve pain and swelling. Ibuprofen or other NSAIDs are often recommended. In some cases, you may be given prescription medicine, such as muscle relaxants.    Avoid activities that put stress on the chest or spine.    Apply a heating pad (set to warm, not too high, heat) to the breastbone several times a day.    Perform stretching exercises as directed.   Call the healthcare provider right away if you have any of the following:    Pain that is not relieved by medicine    Shortness of breath    Lightheadedness, dizziness, or fainting    Feeling of irregular heartbeat or fast pulse  Anyone with chest pain should see a healthcare provider, especially those who are older and may be at risk for heart disease.   Date Last Reviewed: 10/1/2016    [id number] [employer]. [address] [place] [address], [place] [address]. All rights reserved. This information is not intended as a substitute for professional medical care. Always follow your healthcare professional's instructions.

## 2019-05-11 ASSESSMENT — ANXIETY QUESTIONNAIRES: GAD7 TOTAL SCORE: 4

## 2019-08-06 ENCOUNTER — OFFICE VISIT (OUTPATIENT)
Dept: FAMILY MEDICINE | Facility: CLINIC | Age: 49
End: 2019-08-06
Payer: COMMERCIAL

## 2019-08-06 VITALS
OXYGEN SATURATION: 100 % | TEMPERATURE: 98.5 F | HEIGHT: 64 IN | BODY MASS INDEX: 30.11 KG/M2 | HEART RATE: 84 BPM | SYSTOLIC BLOOD PRESSURE: 126 MMHG | DIASTOLIC BLOOD PRESSURE: 89 MMHG | WEIGHT: 176.4 LBS

## 2019-08-06 DIAGNOSIS — Z12.31 ENCOUNTER FOR SCREENING MAMMOGRAM FOR BREAST CANCER: ICD-10-CM

## 2019-08-06 DIAGNOSIS — Z00.00 ROUTINE GENERAL MEDICAL EXAMINATION AT A HEALTH CARE FACILITY: Primary | ICD-10-CM

## 2019-08-06 DIAGNOSIS — Z13.220 SCREENING FOR HYPERLIPIDEMIA: ICD-10-CM

## 2019-08-06 DIAGNOSIS — R10.11 ABDOMINAL PAIN, RIGHT UPPER QUADRANT: ICD-10-CM

## 2019-08-06 PROCEDURE — 99213 OFFICE O/P EST LOW 20 MIN: CPT | Mod: 25 | Performed by: FAMILY MEDICINE

## 2019-08-06 PROCEDURE — 99396 PREV VISIT EST AGE 40-64: CPT | Performed by: FAMILY MEDICINE

## 2019-08-06 ASSESSMENT — MIFFLIN-ST. JEOR: SCORE: 1405.15

## 2019-08-06 NOTE — PROGRESS NOTES
SUBJECTIVE:   CC: Arcelia Liz is an 49 year old woman who presents for preventive health visit.       Healthy Habits:    Do you get at least three servings of calcium containing foods daily (dairy, green leafy vegetables, etc.)? no    Amount of exercise or daily activities, outside of work: none    Problems taking medications regularly No    Medication side effects: No    Have you had an eye exam in the past two years? yes    Do you see a dentist twice per year? yes    Do you have sleep apnea, excessive snoring or daytime drowsiness?no      Abdominal Pain- RUQ  Intermittent sharp pain noticed over the past x2 months, will occur after eating, especially fatty foods.   Reporting some issues with constipation occasionally.   Denies having any fever, chills, nausea/vomiting.   No previous workup. Asymptomatic at this time.        HEALTH CARE MAINTENANCE: Due for a mammogram; screening labs.      Patient informed that anything we discuss that is not related to preventative medicine, may be billed for; patient verbalizes understanding.      Today's PHQ-2 Score:   PHQ-2 ( 1999 [employer]) 12/21/2017 9/19/2017   Q1: Little interest or pleasure in doing things 0 0   Q2: Feeling down, depressed or hopeless 1 3   PHQ-2 Score 1 3       Abuse: Current or Past(Physical, Sexual or Emotional)- No  Do you feel safe in your environment? Yes    Social History     Tobacco Use     Smoking status: Never Smoker     Smokeless tobacco: Never Used     Tobacco comment: Lives in smoke free household   Substance Use Topics     Alcohol use: No     If you drink alcohol do you typically have >3 drinks per day or >7 drinks per week? No                     Reviewed orders with patient.  Reviewed health maintenance and updated orders accordingly - Yes  Labs reviewed in EPIC  Patient Active Problem List   Diagnosis     CARDIOVASCULAR SCREENING; LDL GOAL LESS THAN 160     Epigastric pain     Mantoux: positive     Inflammatory acne     Comedonal acne      Anemia     Past Surgical History:   Procedure Laterality Date     COLONOSCOPY  9/6/2012    Procedure: COLONOSCOPY;  COLONOSCOPY, CHRONIC LOWER ABD PAIN;  Surgeon: Jordan Mitchell MD;  Location: [facility] OR     DILATION AND CURETTAGE  2005    MAB, twin gestation       Social History     Tobacco Use     Smoking status: Never Smoker     Smokeless tobacco: Never Used     Tobacco comment: Lives in smoke free household   Substance Use Topics     Alcohol use: No     Family History   Problem Relation Age of Onset     Asthma Father      Diabetes Father      Glaucoma No family hx of      Macular Degeneration No family hx of      Thyroid Disease No family hx of      Cerebrovascular Disease No family hx of      Cancer No family hx of      Hypertension No family hx of      C.A.D. No family hx of      Lupus No family hx of      Thrombophilia No family hx of      Psoriasis No family hx of      Eczema No family hx of      Melanoma No family hx of          Current Outpatient Medications   Medication Sig Dispense Refill     ferrous sulfate (SLO-FE) 142 (45 FE) MG TBCR Take 1 tablet (142 mg) by mouth daily 90 tablet 1     Allergies   Allergen Reactions     Nkda [No Known Drug Allergies]        Mammogram Screening: Patient under age 50, mutual decision reflected in health maintenance.      Pertinent mammograms are reviewed under the imaging tab.  History of abnormal Pap smear: NO - age 30- 65 PAP every 3 years recommended  PAP / HPV Latest Ref Rng & Units 4/10/2017 10/18/2013 11/11/2010   PAP - NIL NIL NIL   HPV 16 DNA NEG Negative - -   HPV 18 DNA NEG Negative - -   OTHER HR HPV NEG Negative - -     Reviewed and updated as needed this visit by clinical staff         Reviewed and updated as needed this visit by Provider            ROS:  CONSTITUTIONAL: NEGATIVE for fever, chills, change in weight  INTEGUMENTARY/SKIN: NEGATIVE for worrisome rashes, moles or lesions  EYES: NEGATIVE for vision changes or irritation  ENT: NEGATIVE  for ear, mouth and throat problems  RESP: NEGATIVE for significant cough or SOB  BREAST: NEGATIVE for masses, tenderness or discharge  CV: NEGATIVE for chest pain, palpitations or peripheral edema  GI: NEGATIVE for nausea, abdominal pain, heartburn, or change in bowel habits  : NEGATIVE for unusual urinary or vaginal symptoms. No vaginal bleeding.  MUSCULOSKELETAL: NEGATIVE for significant arthralgias or myalgia  NEURO: NEGATIVE for weakness, dizziness or paresthesias  PSYCHIATRIC: NEGATIVE for changes in mood or affect     OBJECTIVE:   There were no vitals taken for this visit.  EXAM:  GENERAL APPEARANCE: healthy, alert and no distress  EYES: Eyes grossly normal to inspection, PERRL and conjunctivae and sclerae normal  HENT: ear canals and TM's normal, nose and mouth without ulcers or lesions, oropharynx clear and oral mucous membranes moist  NECK: no adenopathy, no asymmetry, masses, or scars and thyroid normal to palpation  RESP: lungs clear to auscultation - no rales, rhonchi or wheezes  BREAST: normal without masses, tenderness or nipple discharge and no palpable axillary masses or adenopathy  CV: regular rate and rhythm, normal S1 S2, no S3 or S4, no murmur, click or rub, no peripheral edema and peripheral pulses strong  ABDOMEN: soft, nontender, no hepatosplenomegaly, no masses and bowel sounds normal  MS: no musculoskeletal defects are noted and gait is age appropriate without ataxia  SKIN: no suspicious lesions or rashes  NEURO: Normal strength and tone, sensory exam grossly normal, mentation intact and speech normal  PSYCH: mentation appears normal and affect normal/bright    Diagnostic Test Results:  Future labs ordered.     ASSESSMENT/PLAN:   Arcelia was seen today for physical.    Diagnoses and all orders for this visit:    Routine general medical examination at a health care facility  -     CBC with platelets; Future    Screening for hyperlipidemia  -     Lipid panel reflex to direct LDL Fasting;  "Future    Encounter for screening mammogram for breast cancer  -     MA SCREENING DIGITAL BILAT - Future  (s+30); Future    Abdominal pain, right upper quadrant, intermittent  Will evaluate for gallbladder and liver disease.  -     **Comprehensive metabolic panel FUTURE anytime; Future  -     US Abdomen Complete; Future  -     CBC with platelets; Future  -     Continue recommended to avoid fatty foods.  And may take OTC MiraLAX as needed for occasional constipation as needed.         COUNSELING:   Reviewed preventive health counseling, as reflected in patient instructions       Regular exercise       Healthy diet/nutrition    Estimated body mass index is 31.5 kg/m  as calculated from the following:    Height as of 10/29/18: 1.6 m (5' 3\").    Weight as of 5/10/19: 80.6 kg (177 lb 12.8 oz).    Weight management plan: Discussed healthy diet and exercise guidelines     reports that she has never smoked. She has never used smokeless tobacco.      Counseling Resources:  ATP IV Guidelines  Pooled Cohorts Equation Calculator  Breast Cancer Risk Calculator  FRAX Risk Assessment  ICSI Preventive Guidelines  Dietary Guidelines for Americans, 2010  USDA's MyPlate  ASA Prophylaxis  Lung CA Screening    Follow up annually and as needed thoughout the year.      Simona Aviles MD  [facility]  "

## 2019-08-09 ENCOUNTER — ANCILLARY PROCEDURE (OUTPATIENT)
Dept: ULTRASOUND IMAGING | Facility: CLINIC | Age: 49
End: 2019-08-09
Attending: FAMILY MEDICINE
Payer: COMMERCIAL

## 2019-08-09 DIAGNOSIS — R10.11 ABDOMINAL PAIN, RIGHT UPPER QUADRANT: ICD-10-CM

## 2019-08-09 DIAGNOSIS — Z00.00 ROUTINE GENERAL MEDICAL EXAMINATION AT A HEALTH CARE FACILITY: ICD-10-CM

## 2019-08-09 DIAGNOSIS — Z13.220 SCREENING FOR HYPERLIPIDEMIA: ICD-10-CM

## 2019-08-09 LAB
ALBUMIN SERPL-MCNC: 3.4 G/DL (ref 3.4–5)
ALP SERPL-CCNC: 89 U/L (ref 40–150)
ALT SERPL W P-5'-P-CCNC: 25 U/L (ref 0–50)
ANION GAP SERPL CALCULATED.3IONS-SCNC: 4 MMOL/L (ref 3–14)
AST SERPL W P-5'-P-CCNC: 18 U/L (ref 0–45)
BILIRUB SERPL-MCNC: 0.4 MG/DL (ref 0.2–1.3)
BUN SERPL-MCNC: 10 MG/DL (ref 7–30)
CALCIUM SERPL-MCNC: 9.3 MG/DL (ref 8.5–10.1)
CHLORIDE SERPL-SCNC: 110 MMOL/L (ref 94–109)
CHOLEST SERPL-MCNC: 187 MG/DL
CO2 SERPL-SCNC: 28 MMOL/L (ref 20–32)
CREAT SERPL-MCNC: 0.63 MG/DL (ref 0.52–1.04)
ERYTHROCYTE [DISTWIDTH] IN BLOOD BY AUTOMATED COUNT: 13.6 % (ref 10–15)
GFR SERPL CREATININE-BSD FRML MDRD: >90 ML/MIN/{1.73_M2}
GLUCOSE SERPL-MCNC: 95 MG/DL (ref 70–99)
HCT VFR BLD AUTO: 39.2 % (ref 35–47)
HDLC SERPL-MCNC: 65 MG/DL
HGB BLD-MCNC: 12.6 G/DL (ref 11.7–15.7)
LDLC SERPL CALC-MCNC: 103 MG/DL
MCH RBC QN AUTO: 27.9 PG (ref 26.5–33)
MCHC RBC AUTO-ENTMCNC: 32.1 G/DL (ref 31.5–36.5)
MCV RBC AUTO: 87 FL (ref 78–100)
NONHDLC SERPL-MCNC: 122 MG/DL
PLATELET # BLD AUTO: 199 10E9/L (ref 150–450)
POTASSIUM SERPL-SCNC: 4.3 MMOL/L (ref 3.4–5.3)
PROT SERPL-MCNC: 7 G/DL (ref 6.8–8.8)
RBC # BLD AUTO: 4.51 10E12/L (ref 3.8–5.2)
SODIUM SERPL-SCNC: 142 MMOL/L (ref 133–144)
TRIGL SERPL-MCNC: 95 MG/DL
WBC # BLD AUTO: 3.4 10E9/L (ref 4–11)

## 2019-08-09 PROCEDURE — 85027 COMPLETE CBC AUTOMATED: CPT | Performed by: FAMILY MEDICINE

## 2019-08-09 PROCEDURE — 80053 COMPREHEN METABOLIC PANEL: CPT | Performed by: FAMILY MEDICINE

## 2019-08-09 PROCEDURE — 80061 LIPID PANEL: CPT | Performed by: FAMILY MEDICINE

## 2019-08-09 PROCEDURE — 76700 US EXAM ABDOM COMPLETE: CPT

## 2019-08-09 PROCEDURE — 36415 COLL VENOUS BLD VENIPUNCTURE: CPT | Performed by: FAMILY MEDICINE

## 2019-08-13 ENCOUNTER — TRANSFERRED RECORDS (OUTPATIENT)
Dept: HEALTH INFORMATION MANAGEMENT | Facility: CLINIC | Age: 49
End: 2019-08-13

## 2019-11-05 ENCOUNTER — HEALTH MAINTENANCE LETTER (OUTPATIENT)
Age: 49
End: 2019-11-05

## 2020-02-28 ENCOUNTER — OFFICE VISIT (OUTPATIENT)
Dept: FAMILY MEDICINE | Facility: CLINIC | Age: 50
End: 2020-02-28
Payer: COMMERCIAL

## 2020-02-28 VITALS
BODY MASS INDEX: 31.24 KG/M2 | HEART RATE: 72 BPM | RESPIRATION RATE: 18 BRPM | WEIGHT: 183 LBS | SYSTOLIC BLOOD PRESSURE: 134 MMHG | DIASTOLIC BLOOD PRESSURE: 84 MMHG | OXYGEN SATURATION: 100 % | HEIGHT: 64 IN | TEMPERATURE: 98.2 F

## 2020-02-28 DIAGNOSIS — Z86.19 HISTORY OF HELICOBACTER PYLORI INFECTION: ICD-10-CM

## 2020-02-28 DIAGNOSIS — Z86.2 HISTORY OF ANEMIA: ICD-10-CM

## 2020-02-28 DIAGNOSIS — B35.4 TINEA CORPORIS: Primary | ICD-10-CM

## 2020-02-28 LAB
ERYTHROCYTE [DISTWIDTH] IN BLOOD BY AUTOMATED COUNT: 14.2 % (ref 10–15)
HCT VFR BLD AUTO: 38.4 % (ref 35–47)
HGB BLD-MCNC: 12.2 G/DL (ref 11.7–15.7)
MCH RBC QN AUTO: 26.8 PG (ref 26.5–33)
MCHC RBC AUTO-ENTMCNC: 31.8 G/DL (ref 31.5–36.5)
MCV RBC AUTO: 84 FL (ref 78–100)
PLATELET # BLD AUTO: 243 10E9/L (ref 150–450)
RBC # BLD AUTO: 4.55 10E12/L (ref 3.8–5.2)
WBC # BLD AUTO: 3.8 10E9/L (ref 4–11)

## 2020-02-28 PROCEDURE — 36415 COLL VENOUS BLD VENIPUNCTURE: CPT | Performed by: FAMILY MEDICINE

## 2020-02-28 PROCEDURE — 85027 COMPLETE CBC AUTOMATED: CPT | Performed by: FAMILY MEDICINE

## 2020-02-28 PROCEDURE — 99214 OFFICE O/P EST MOD 30 MIN: CPT | Performed by: FAMILY MEDICINE

## 2020-02-28 PROCEDURE — 87338 HPYLORI STOOL AG IA: CPT | Performed by: FAMILY MEDICINE

## 2020-02-28 RX ORDER — CLOTRIMAZOLE 1 %
CREAM (GRAM) TOPICAL 2 TIMES DAILY
Qty: 30 G | Refills: 3 | Status: SHIPPED | OUTPATIENT
Start: 2020-02-28 | End: 2022-01-12

## 2020-02-28 ASSESSMENT — MIFFLIN-ST. JEOR: SCORE: 1435.08

## 2020-02-28 NOTE — PATIENT INSTRUCTIONS
Patient Education     Fungal Skin Infection (Tinea)  A fungal infection occurs when too much fungus grows on or in the body. Fungus normally lives on the skin in small amounts and does not cause harm. But when too much grows on the skin, it causes an infection. This is also known as tinea. Fungal skin infections are common and not usually serious.  The infection often starts as a small red area the size of a pea. The skin may turn dry and flaky. The area may itch. As the fungus grows, it spreads out in a red [place]. Because of how it looks, fungal skin infection is often called ringworm, but it is not caused by a worm. Fungal skin infections can occur on many parts of the body. They can grow on the head, chest, arms, or legs. They can occur on the buttocks. On the feet, fungal infection is known as  athlete s foot.  It causes itchy, sometimes painful sores between the toes and the bottom or sides of the feet. In the groin, the rash is called  jock itch.   People with weak immune systems can get a fungal infection more easily. This includes people with diabetes or HIV, or who are being treated for cancer. In these cases, the fungal infection can spread and cause severe illness. Fungal infections are also more common in people who are overweight.  In most cases, treatment is done with antifungal cream or ointment. If the infection is on your scalp, you may take oral medicine. In some cases, a tiny piece of the skin (biopsy) may be taken. This is so it can be tested in a lab.  Common fungal infections are treated with creams on the skin or oral medicine.  Home care  Follow all instructions when using antifungal cream or ointment on your skin. Your healthcare provider may advise using cornstarch powder to keep your skin dry or petroleum jelly to provide a barrier.  General care:    If you were prescribed an oral medicine, read the patient information. Talk with your healthcare provider about the risks and side  effects.    Let your skin dry completely after bathing. Carefully dry your feet and between your toes.    Dress in loose cotton clothing.    Don t scratch the affected area. This can delay healing and may spread the infection. It can also cause a bacterial infection.    Keep your skin clean, but don t wash the skin too much. This can irritate your skin.    Keep in mind that it may take a week before the fungus starts to go away. It can take 2 to 4 weeks to fully clear. To prevent it from coming back, use the medicine until the rash is all gone.  Follow-up care  Follow up with your healthcare provider if the rash does not get better after 10 days of treatment. Also follow up if the rash spreads to other parts of your body.  When to seek medical advice  Call your healthcare provider right away if any of these occur:    Fever of 100.4 F (38 C) or higher    Redness or swelling that gets worse    Pain that gets worse    Foul-smelling fluid leaking from the skin  Date Last Reviewed: 11/1/2016 2000-2019 [employer]. [address] [place] [address], [place] [address]. All rights reserved. This information is not intended as a substitute for professional medical care. Always follow your healthcare professional's instructions.

## 2020-02-28 NOTE — PROGRESS NOTES
Subjective     Arcelia Liz is a 49 year old female who presents to clinic today for the following health issues:    HPI   ED/UC Followup:    Facility:  [facility] ER   Date of visit: 1/11/20  Reason for visit: Abdominal pain   Current Status: Abdominal pain has since resolved. Completed pepcid and tylenol.    Has a hx of H pylori but was not retested at this ER visit- would like to be retested.     Recent ER records reviewed-refer to them for details in care everywhere.    Derm Problem   Dryness and darkening of the skin neck and chin, having some flaking and itching.   Noticed circular lesions  Regular lotion has not been giving relief.     Lab Request   Would like to recheck iron levels.    Has not taken iron pill over the past year but is reporting fatigue.         Patient Active Problem List   Diagnosis     CARDIOVASCULAR SCREENING; LDL GOAL LESS THAN 160     Epigastric pain     Mantoux: positive     Inflammatory acne     Comedonal acne     Anemia     Past Surgical History:   Procedure Laterality Date     COLONOSCOPY  9/6/2012    Procedure: COLONOSCOPY;  COLONOSCOPY, CHRONIC LOWER ABD PAIN;  Surgeon: Jordan Mitchell MD;  Location: [facility] OR     DILATION AND CURETTAGE  2005    MAB, twin gestation       Social History     Tobacco Use     Smoking status: Never Smoker     Smokeless tobacco: Never Used     Tobacco comment: Lives in smoke free household   Substance Use Topics     Alcohol use: No     Family History   Problem Relation Age of Onset     Asthma Father      Diabetes Father      Glaucoma No family hx of      Macular Degeneration No family hx of      Thyroid Disease No family hx of      Cerebrovascular Disease No family hx of      Cancer No family hx of      Hypertension No family hx of      C.A.D. No family hx of      Lupus No family hx of      Thrombophilia No family hx of      Psoriasis No family hx of      Eczema No family hx of      Melanoma No family hx of          Current Outpatient  "Medications   Medication Sig Dispense Refill     clotrimazole (LOTRIMIN) 1 % external cream Apply topically 2 times daily 30 g 3     ferrous sulfate (SLO-FE) 142 (45 FE) MG TBCR Take 1 tablet (142 mg) by mouth daily 90 tablet 1     Allergies   Allergen Reactions     Nkda [No Known Drug Allergies]          Reviewed and updated as needed this visit by Provider         Review of Systems   ROS COMP: Constitutional, HEENT, cardiovascular, pulmonary, gi and gu systems are negative, except as otherwise noted.      Objective    /84   Pulse 72   Temp 98.2  F (36.8  C) (Tympanic)   Resp 18   Ht 1.626 m (5' 4\")   Wt 83 kg (183 lb)   SpO2 100%   Breastfeeding No   BMI 31.41 kg/m    Body mass index is 31.41 kg/m .  Physical Exam   GENERAL: healthy, alert and no distress  ABDOMEN: soft, nontender, no hepatosplenomegaly, no masses and bowel sounds normal  SKIN: Oval shaped, dark, flat macular lesions around the neck with some scaling. No signs of any secondary bacterial infection.     Diagnostic Test Results:  Labs reviewed in Epic        Assessment & Plan     Arcelia was seen today for hospital f/u and derm problem.    Diagnoses and all orders for this visit:    Tinea corporis  -     clotrimazole (LOTRIMIN) 1 % external cream; Apply topically 2 times daily    History of anemia  -     CBC with platelets    History of Helicobacter pylori infection  -     Helicobacter pylori Antigen Stool; Future  -     Helicobacter pylori Antigen Stool      Patient education and Handout with home care instructions given. See AVS for details.    Will notify her with pending labs.    Return in about 2 months (around 4/28/2020) for Follow up- Pap..    Simona Aviles MD  [facility]        "

## 2020-03-02 LAB — H PYLORI AG STL QL IA: NEGATIVE

## 2020-04-07 ENCOUNTER — APPOINTMENT (OUTPATIENT)
Dept: GENERAL RADIOLOGY | Facility: CLINIC | Age: 50
End: 2020-04-07
Attending: EMERGENCY MEDICINE
Payer: COMMERCIAL

## 2020-04-07 ENCOUNTER — HOSPITAL ENCOUNTER (EMERGENCY)
Facility: CLINIC | Age: 50
Discharge: HOME | End: 2020-04-07
Attending: EMERGENCY MEDICINE | Admitting: EMERGENCY MEDICINE
Payer: COMMERCIAL

## 2020-04-07 VITALS
HEART RATE: 95 BPM | RESPIRATION RATE: 16 BRPM | TEMPERATURE: 98.5 F | DIASTOLIC BLOOD PRESSURE: 79 MMHG | OXYGEN SATURATION: 96 % | SYSTOLIC BLOOD PRESSURE: 110 MMHG

## 2020-04-07 DIAGNOSIS — R07.9 CHEST PAIN, UNSPECIFIED TYPE: ICD-10-CM

## 2020-04-07 LAB
ANION GAP SERPL CALCULATED.3IONS-SCNC: 5 MMOL/L (ref 3–14)
BASOPHILS # BLD AUTO: 0 10E9/L (ref 0–0.2)
BASOPHILS NFR BLD AUTO: 0.8 %
BUN SERPL-MCNC: 11 MG/DL (ref 7–30)
CALCIUM SERPL-MCNC: 9.6 MG/DL (ref 8.5–10.1)
CHLORIDE SERPL-SCNC: 105 MMOL/L (ref 94–109)
CO2 SERPL-SCNC: 30 MMOL/L (ref 20–32)
CREAT SERPL-MCNC: 0.53 MG/DL (ref 0.52–1.04)
DIFFERENTIAL METHOD BLD: ABNORMAL
EOSINOPHIL # BLD AUTO: 0 10E9/L (ref 0–0.7)
EOSINOPHIL NFR BLD AUTO: 0.6 %
ERYTHROCYTE [DISTWIDTH] IN BLOOD BY AUTOMATED COUNT: 15.1 % (ref 10–15)
GFR SERPL CREATININE-BSD FRML MDRD: >90 ML/MIN/{1.73_M2}
GLUCOSE SERPL-MCNC: 98 MG/DL (ref 70–99)
HCT VFR BLD AUTO: 38.7 % (ref 35–47)
HGB BLD-MCNC: 12.6 G/DL (ref 11.7–15.7)
IMM GRANULOCYTES # BLD: 0 10E9/L (ref 0–0.4)
IMM GRANULOCYTES NFR BLD: 0.2 %
INTERPRETATION ECG - MUSE: NORMAL
LYMPHOCYTES # BLD AUTO: 2.1 10E9/L (ref 0.8–5.3)
LYMPHOCYTES NFR BLD AUTO: 41.7 %
MCH RBC QN AUTO: 26.8 PG (ref 26.5–33)
MCHC RBC AUTO-ENTMCNC: 32.6 G/DL (ref 31.5–36.5)
MCV RBC AUTO: 82 FL (ref 78–100)
MONOCYTES # BLD AUTO: 0.3 10E9/L (ref 0–1.3)
MONOCYTES NFR BLD AUTO: 6.7 %
NEUTROPHILS # BLD AUTO: 2.6 10E9/L (ref 1.6–8.3)
NEUTROPHILS NFR BLD AUTO: 50 %
NRBC # BLD AUTO: 0 10*3/UL
NRBC BLD AUTO-RTO: 0 /100
PLATELET # BLD AUTO: 254 10E9/L (ref 150–450)
POTASSIUM SERPL-SCNC: 3.8 MMOL/L (ref 3.4–5.3)
RBC # BLD AUTO: 4.71 10E12/L (ref 3.8–5.2)
SODIUM SERPL-SCNC: 140 MMOL/L (ref 133–144)
TROPONIN I BLD-MCNC: 0 UG/L (ref 0–0.08)
WBC # BLD AUTO: 5.1 10E9/L (ref 4–11)

## 2020-04-07 PROCEDURE — 93005 ELECTROCARDIOGRAM TRACING: CPT | Performed by: EMERGENCY MEDICINE

## 2020-04-07 PROCEDURE — 80048 BASIC METABOLIC PNL TOTAL CA: CPT | Performed by: EMERGENCY MEDICINE

## 2020-04-07 PROCEDURE — 84484 ASSAY OF TROPONIN QUANT: CPT

## 2020-04-07 PROCEDURE — 93010 ELECTROCARDIOGRAM REPORT: CPT | Mod: Z6 | Performed by: EMERGENCY MEDICINE

## 2020-04-07 PROCEDURE — 85025 COMPLETE CBC W/AUTO DIFF WBC: CPT | Performed by: EMERGENCY MEDICINE

## 2020-04-07 PROCEDURE — 99285 EMERGENCY DEPT VISIT HI MDM: CPT | Mod: 25 | Performed by: EMERGENCY MEDICINE

## 2020-04-07 PROCEDURE — 71046 X-RAY EXAM CHEST 2 VIEWS: CPT

## 2020-04-07 ASSESSMENT — ENCOUNTER SYMPTOMS
SHORTNESS OF BREATH: 0
FEVER: 0
BRUISES/BLEEDS EASILY: 0
CONFUSION: 0
CHILLS: 0
ARTHRALGIAS: 0
HEADACHES: 0
POLYDIPSIA: 0
CHEST TIGHTNESS: 0
ADENOPATHY: 0
PALPITATIONS: 0
COUGH: 0
DIFFICULTY URINATING: 0
COLOR CHANGE: 0
ABDOMINAL PAIN: 0
LIGHT-HEADEDNESS: 0
NECK STIFFNESS: 0
EYE REDNESS: 0

## 2020-04-07 NOTE — DISCHARGE INSTRUCTIONS
Please make an appointment to follow up with Your Primary Care Provider and Cardiology Clinic (phone: [phone]) in 2-3 days for further evaluation and recommendations.  If your symptoms worsen prior to your follow-up appointment please come back to the emergency department.

## 2020-04-07 NOTE — ED AVS SNAPSHOT
[facility], [place], Emergency Department  [address]  [facility] [address]  Phone:  [phone]  Fax:  [phone]                                    Arcelia Liz   MRN: [id number]    Department:  [facility], Emergency Department   Date of Visit:  4/7/2020           After Visit Summary Signature Page    I have received my discharge instructions, and my questions have been answered. I have discussed any challenges I see with this plan with the nurse or doctor.    ..........................................................................................................................................  Patient/Patient Representative Signature      ..........................................................................................................................................  Patient Representative Print Name and Relationship to Patient    ..................................................               ................................................  Date                                   Time    ..........................................................................................................................................  Reviewed by Signature/Title    ...................................................              ..............................................  Date                                               Time          22EPIC Rev 08/18        (3) no apparent problem

## 2020-04-07 NOTE — ED PROVIDER NOTES
[facility] EMERGENCY DEPARTMENT ([facility])     April 7, 2020    ED Provider Note  [facility]      History     Chief Complaint   Patient presents with     Chest Pain     Pt has has CP since yesterday     HPI  Arcelia Liz is a 50 year old female with a history of latent tuberculosis infection (treated for 3 months in 1990s and completed 9-month treatment in 11/2010) who presents to the ED for evaluation of chest pain located underneath her left breast. Patient reports the pain started suddenly yesterday. She states the pain is constant, sharp, nonradiating, mild, not makes it better or worse.  She denies any provoking factors that seem to cause the pain. She reports she had tried oral Tylenol for pain control, with minimal improvement.  Patient denies nausea, vomiting, fever, cough, shortness of breath, or abdominal pain. She denies previous history of MI, diabetes, HTN, or HLD.  No recent hospitalizations or surgeries.  She states she is a non-smoker.    Past Medical History  Past Medical History:   Diagnosis Date     Anemia 8/11/2015     Eczema      Fertility problem      History of vitamin D deficiency      Inflammatory acne 8/11/2015     LTBI (latent tuberculosis infection) 1990s    tx for 3 mos. 1990s, completed 9 mo tx inh 11/2010     Past Surgical History:   Procedure Laterality Date     COLONOSCOPY  9/6/2012    Procedure: COLONOSCOPY;  COLONOSCOPY, CHRONIC LOWER ABD PAIN;  Surgeon: Jordan Mitchell MD;  Location: [facility] OR     DILATION AND CURETTAGE  2005    MAB, twin gestation     clotrimazole (LOTRIMIN) 1 % external cream  ferrous sulfate (SLO-FE) 142 (45 FE) MG TBCR      Allergies   Allergen Reactions     Nkda [No Known Drug Allergies]      Past medical history, past surgical history, medications, and allergies were reviewed with the patient. Additional pertinent items: None    Family History  Family History   Problem Relation Age of Onset     Asthma Father       Diabetes Father      Glaucoma No family hx of      Macular Degeneration No family hx of      Thyroid Disease No family hx of      Cerebrovascular Disease No family hx of      Cancer No family hx of      Hypertension No family hx of      C.A.D. No family hx of      Lupus No family hx of      Thrombophilia No family hx of      Psoriasis No family hx of      Eczema No family hx of      Melanoma No family hx of      Family history was reviewed with the patient. Additional pertinent items: None    Social History  Social History     Tobacco Use     Smoking status: Never Smoker     Smokeless tobacco: Never Used     Tobacco comment: Lives in smoke free household   Substance Use Topics     Alcohol use: No     Drug use: No      Social history was reviewed with the patient. Additional pertinent items: None    Review of Systems   Constitutional: Negative for chills and fever.   HENT: Negative for congestion.    Eyes: Negative for redness.   Respiratory: Negative for cough, chest tightness and shortness of breath.    Cardiovascular: Positive for chest pain. Negative for palpitations and leg swelling.   Gastrointestinal: Negative for abdominal pain.   Endocrine: Negative for polydipsia and polyuria.   Genitourinary: Negative for difficulty urinating.   Musculoskeletal: Negative for arthralgias and neck stiffness.   Skin: Negative for color change.   Allergic/Immunologic: Negative for immunocompromised state.   Neurological: Negative for light-headedness and headaches.   Hematological: Negative for adenopathy. Does not bruise/bleed easily.   Psychiatric/Behavioral: Negative for confusion.   All other systems reviewed and are negative.    A complete review of systems was performed with pertinent positives and negatives noted in the HPI, and all other systems negative.    Physical Exam   BP: (!) 132/94  Pulse: 95  Heart Rate: 115  Temp: 98.4  F (36.9  C)  Resp: 18  SpO2: 97 %  Physical Exam  Vitals signs and nursing note reviewed.    Constitutional:       General: She is not in acute distress.     Appearance: Normal appearance. She is normal weight. She is not diaphoretic.   HENT:      Head: Normocephalic and atraumatic.      Nose: Nose normal.      Mouth/Throat:      Mouth: Mucous membranes are moist.      Pharynx: Oropharynx is clear. No oropharyngeal exudate.   Eyes:      General: No scleral icterus.     Extraocular Movements: Extraocular movements intact.      Conjunctiva/sclera: Conjunctivae normal.      Pupils: Pupils are equal, round, and reactive to light.   Neck:      Musculoskeletal: Normal range of motion.   Cardiovascular:      Rate and Rhythm: Normal rate.      Pulses: Normal pulses.      Heart sounds: Normal heart sounds.   Pulmonary:      Effort: Pulmonary effort is normal. No respiratory distress.      Breath sounds: Normal breath sounds. No wheezing.   Abdominal:      Palpations: Abdomen is soft.      Tenderness: There is no abdominal tenderness.   Musculoskeletal: Normal range of motion.         General: No swelling or tenderness.      Right lower leg: No edema.      Left lower leg: No edema.   Skin:     General: Skin is warm.      Capillary Refill: Capillary refill takes less than 2 seconds.      Findings: No rash.   Neurological:      General: No focal deficit present.      Mental Status: She is alert and oriented to person, place, and time.      Cranial Nerves: No cranial nerve deficit.      Sensory: No sensory deficit.      Coordination: Coordination normal.   Psychiatric:         Mood and Affect: Mood normal.         Behavior: Behavior normal.         Thought Content: Thought content normal.         Judgment: Judgment normal.         ED Course      Procedures             EKG Interpretation:      Interpreted by Karin Martinez MD  Time reviewed: 2:29 PM  Symptoms at time of EKG: Chest pain  Rhythm: Sinus tachycardia  Rate: 114  Axis: normal  Ectopy: none  Conduction: normal  ST Segments/ T Waves: No ST-T wave changes  Q  Waves: none  Comparison to prior: Unchanged from old EKG done on May 10, 2019, other than the rate    Clinical Impression: Sinus tachycardia                      Results for orders placed or performed during the hospital encounter of 04/07/20   XR Chest 2 Views     Status: None    Narrative    CHEST TWO VIEWS   4/7/2020 2:58 PM     HISTORY: Chest pain    COMPARISON: Chest x-ray 5/10/2019.      Impression    IMPRESSION: PA and lateral views of the chest. Lungs are clear. Heart  is normal in size. No effusions are evident. No pneumothorax.    CANDACE RED MD   CBC with platelets differential     Status: Abnormal   Result Value Ref Range    WBC 5.1 4.0 - 11.0 10e9/L    RBC Count 4.71 3.8 - 5.2 10e12/L    Hemoglobin 12.6 11.7 - 15.7 g/dL    Hematocrit 38.7 35.0 - 47.0 %    MCV 82 78 - 100 fl    MCH 26.8 26.5 - 33.0 pg    MCHC 32.6 31.5 - 36.5 g/dL    RDW 15.1 (H) 10.0 - 15.0 %    Platelet Count 254 150 - 450 10e9/L    Diff Method Automated Method     % Neutrophils 50.0 %    % Lymphocytes 41.7 %    % Monocytes 6.7 %    % Eosinophils 0.6 %    % Basophils 0.8 %    % Immature Granulocytes 0.2 %    Nucleated RBCs 0 0 /100    Absolute Neutrophil 2.6 1.6 - 8.3 10e9/L    Absolute Lymphocytes 2.1 0.8 - 5.3 10e9/L    Absolute Monocytes 0.3 0.0 - 1.3 10e9/L    Absolute Eosinophils 0.0 0.0 - 0.7 10e9/L    Absolute Basophils 0.0 0.0 - 0.2 10e9/L    Abs Immature Granulocytes 0.0 0 - 0.4 10e9/L    Absolute Nucleated RBC 0.0    Basic metabolic panel     Status: None   Result Value Ref Range    Sodium 140 133 - 144 mmol/L    Potassium 3.8 3.4 - 5.3 mmol/L    Chloride 105 94 - 109 mmol/L    Carbon Dioxide 30 20 - 32 mmol/L    Anion Gap 5 3 - 14 mmol/L    Glucose 98 70 - 99 mg/dL    Urea Nitrogen 11 7 - 30 mg/dL    Creatinine 0.53 0.52 - 1.04 mg/dL    GFR Estimate >90 >60 mL/min/[1.73_m2]    GFR Estimate If Black >90 >60 mL/min/[1.73_m2]    Calcium 9.6 8.5 - 10.1 mg/dL   EKG 12 lead     Status: None (Preliminary result)   Result Value Ref  Range    Interpretation ECG Click View Image link to view waveform and result    Troponin POCT     Status: None   Result Value Ref Range    Troponin I 0.00 0.00 - 0.08 ug/L     Medications - No data to display     Assessments & Plan (with Medical Decision Making)   This is a 50 year old female presenting with left-sided chest pain since yesterday. Differential diagnosis: ACS, pneumothorax, pneumonia, esophageal spasm, GERD, unlikely pulmonary embolus.     After thorough history and physical examination, patient appears to be in no acute distress. I will obtain laboratory studies, EKG, and chest x-ray for further diagnotic evaluation. Patient agrees with the plan.    Patient's laboratory studies returned without any evidence of leukocytosis, WBC is normal at 5100. There is no evidence of anemia, hemoglobin is normal at 12.6.  Electrolytes show no evidence of dehydration, creatinine is normal at 0.53.  Troponin is undetectable and EKG showed no acute ischemic changes.  I do not believe the patient needs serial troponins given the fact that her pain has been constant since yesterday.  If she had an acute MI troponin should be elevated by now.  I reviewed her chest x-ray and I read the radiology report; no evidence of pneumonia, pneumothorax, or widened mediastinum.  At this time I have low suspicion for acute coronary syndrome.  I do not suspect that she has pulmonary embolus or aortic dissection.  I believe that she stable for discharge with outpatient primary care follow-up as well as cardiology follow-up.  Order was placed in the electronic medical records for cardiology follow-up.  She agrees with this plan and she also agrees to return to the emergency department if her symptoms worsen prior to follow-up.  She appears in no acute distress whatsoever and she is stable for discharge.     This part of the medical record was transcribed by Maite Warner  Medical Scribe, from a dictation done by Karin Martinez MD.      I have reviewed the nursing notes. I have reviewed the findings, diagnosis, plan and need for follow up with the patient.    Discharge Medication List as of 4/7/2020  3:14 PM          Final diagnoses:   Chest pain, unspecified type     I, Maite Warner, am serving as a trained medical scribe to document services personally performed by Karin Martinez MD, based on the provider's statements to me.      I, Karin Martinez MD, was physically present and have reviewed and verified the accuracy of this note documented by Maite Warner.     --  Karin Martinez MD  Emergency Medicine   [facility], [place], EMERGENCY DEPARTMENT  4/7/2020     Karin Martinez MD  04/07/20 [id number]

## 2020-06-27 ENCOUNTER — NURSE TRIAGE (OUTPATIENT)
Dept: NURSING | Facility: CLINIC | Age: 50
End: 2020-06-27

## 2020-06-27 NOTE — TELEPHONE ENCOUNTER
"She had this happened in January and it re-occurred one week ago. Right sided stomach pain which is sometimes dull and sometimes sharp. She's used antacid and found it doesn't help. I connected with scheduling for an appointment today.  Tracy Burch RN  [facility]      Additional Information    Negative: Severe difficulty breathing (e.g., struggling for each breath, speaks in single words)    Negative: Shock suspected (e.g., cold/pale/clammy skin, too weak to stand, low BP, rapid pulse)    Negative: Difficult to awaken or acting confused (e.g., disoriented, slurred speech)    Negative: Passed out (i.e., lost consciousness, collapsed and was not responding)    Negative: Visible sweat on face or sweat dripping down face    Negative: Sounds like a life-threatening emergency to the triager    Negative: Followed an abdomen (stomach) injury    Negative: Chest pain    Negative: [1] SEVERE pain (e.g., excruciating) AND [2] present > 1 hour     Pain at 5    Negative: [1] Pain lasts > 10 minutes AND [2] age > 50    Negative: [1] Pain lasts > 10 minutes AND [2] age > 40 AND [3] associated chest, arm, neck, upper back or jaw pain    Negative: [1] Pain lasts > 10 minutes AND [2] age > 35 AND [3] at least one cardiac risk factor (i.e., hypertension, diabetes, obesity, smoker or strong family history of heart disease)    Negative: [1] Pain lasts > 10 minutes AND [2] history of heart disease (i.e., heart attack, bypass surgery, angina, angioplasty, CHF; not just a heart murmur)    Negative: [1] Pain lasts > 10 minutes AND [2] difficulty breathing    Negative: [1] Vomiting AND [2] contains red blood  (Exception: few streaks and only occurred once)    Negative: [1] Vomiting AND [2] contains black (\"coffee ground\") material    Negative: Blood in bowel movements  (Exception: Blood on surface of BM with constipation)    Negative: Black or tarry bowel movements  (Exception: chronic-unchanged  black-grey bowel movements " AND is taking iron pills or Pepto-bismol)    Negative: [1] Pregnant > 24 weeks AND [2] hand or face swelling    Negative: Patient sounds very sick or weak to the triager    Negative: [1] MILD-MODERATE pain AND [2] constant AND [3] present > 2 hours    [1] MILD-MODERATE pain AND [2] not relieved by antacids    Protocols used: ABDOMINAL PAIN - UPPER-A-AH

## 2020-06-29 ENCOUNTER — OFFICE VISIT (OUTPATIENT)
Dept: FAMILY MEDICINE | Facility: CLINIC | Age: 50
End: 2020-06-29
Payer: COMMERCIAL

## 2020-06-29 VITALS
OXYGEN SATURATION: 98 % | HEART RATE: 100 BPM | SYSTOLIC BLOOD PRESSURE: 123 MMHG | DIASTOLIC BLOOD PRESSURE: 85 MMHG | BODY MASS INDEX: 30.38 KG/M2 | WEIGHT: 177 LBS | RESPIRATION RATE: 22 BRPM | TEMPERATURE: 98.1 F

## 2020-06-29 DIAGNOSIS — R10.11 RIGHT UPPER QUADRANT PAIN: Primary | ICD-10-CM

## 2020-06-29 DIAGNOSIS — K83.8 DILATED BILE DUCT: ICD-10-CM

## 2020-06-29 LAB
ALBUMIN SERPL-MCNC: 3.8 G/DL (ref 3.4–5)
ALP SERPL-CCNC: 101 U/L (ref 40–150)
ALT SERPL W P-5'-P-CCNC: 22 U/L (ref 0–50)
AMYLASE SERPL-CCNC: 45 U/L (ref 30–110)
AST SERPL W P-5'-P-CCNC: 16 U/L (ref 0–45)
BILIRUB DIRECT SERPL-MCNC: 0.1 MG/DL (ref 0–0.2)
BILIRUB SERPL-MCNC: 0.4 MG/DL (ref 0.2–1.3)
ERYTHROCYTE [DISTWIDTH] IN BLOOD BY AUTOMATED COUNT: 13.8 % (ref 10–15)
HCT VFR BLD AUTO: 39.1 % (ref 35–47)
HGB BLD-MCNC: 12.6 G/DL (ref 11.7–15.7)
LIPASE SERPL-CCNC: 45 U/L (ref 73–393)
MCH RBC QN AUTO: 27.9 PG (ref 26.5–33)
MCHC RBC AUTO-ENTMCNC: 32.2 G/DL (ref 31.5–36.5)
MCV RBC AUTO: 87 FL (ref 78–100)
PLATELET # BLD AUTO: 268 10E9/L (ref 150–450)
PROT SERPL-MCNC: 7.7 G/DL (ref 6.8–8.8)
RBC # BLD AUTO: 4.52 10E12/L (ref 3.8–5.2)
WBC # BLD AUTO: 3.6 10E9/L (ref 4–11)

## 2020-06-29 PROCEDURE — 36415 COLL VENOUS BLD VENIPUNCTURE: CPT | Performed by: FAMILY MEDICINE

## 2020-06-29 PROCEDURE — 80076 HEPATIC FUNCTION PANEL: CPT | Performed by: FAMILY MEDICINE

## 2020-06-29 PROCEDURE — 99214 OFFICE O/P EST MOD 30 MIN: CPT | Performed by: FAMILY MEDICINE

## 2020-06-29 PROCEDURE — 85027 COMPLETE CBC AUTOMATED: CPT | Performed by: FAMILY MEDICINE

## 2020-06-29 PROCEDURE — 82150 ASSAY OF AMYLASE: CPT | Performed by: FAMILY MEDICINE

## 2020-06-29 PROCEDURE — 83690 ASSAY OF LIPASE: CPT | Performed by: FAMILY MEDICINE

## 2020-06-29 NOTE — PROGRESS NOTES
Subjective     Arcelia Liz is a 50 year old female who presents to clinic today for the following health issues:    HPI   ABDOMINAL   PAIN     Onset: x1 week    Description:   Character: dull, sharp   Location: right upper quadrant  Radiation: Back    Intensity: 5/10    Progression of Symptoms:  same    Accompanying Signs & Symptoms:  Fever/Chills?: no   Gas/Bloating: YES  Nausea: no   Vomitting: no   Diarrhea?: no   Constipation:YES  Dysuria or Hematuria: no    History:   Trauma: no   Previous similar pain: YES- seen in ED 1/11/20   Previous tests done: States US was completed but is not in care everywhere     Precipitating factors:   Does the pain change with:     Food: YES- worsens with eating      BM: YES- improves after BM     Urination: no     Alleviating factors:  none    Therapies Tried and outcome: none    LMP:  6/9/20     Patient was seen in the ER on 1/11/2020 with what was described as Epigastric pain. States that she was given a PPI- Omeprazole which helped.     Abdominal US done at that time revealed a dilated extrahepatic bile duct, 8 mm. Gallbladder distended but no gallstones. Recommended to consider MR Abdomen.   Patient is willing to proceed with further imaging studies as the Abdo pain has persisted.   Denies having any chest pain, shortness of breath or dyspnea or exertion. No fever or chills. No nausea or vomiting. No bowel habit changes.        Patient Active Problem List   Diagnosis     CARDIOVASCULAR SCREENING; LDL GOAL LESS THAN 160     Epigastric pain     Mantoux: positive     Inflammatory acne     Comedonal acne     Anemia     Past Surgical History:   Procedure Laterality Date     COLONOSCOPY  9/6/2012    Procedure: COLONOSCOPY;  COLONOSCOPY, CHRONIC LOWER ABD PAIN;  Surgeon: Jordan Mitchell MD;  Location: [facility] OR     DILATION AND CURETTAGE  2005    MAB, twin gestation       Social History     Tobacco Use     Smoking status: Never Smoker     Smokeless tobacco: Never Used      Tobacco comment: Lives in smoke free household   Substance Use Topics     Alcohol use: No     Family History   Problem Relation Age of Onset     Asthma Father      Diabetes Father      Glaucoma No family hx of      Macular Degeneration No family hx of      Thyroid Disease No family hx of      Cerebrovascular Disease No family hx of      Cancer No family hx of      Hypertension No family hx of      C.A.D. No family hx of      Lupus No family hx of      Thrombophilia No family hx of      Psoriasis No family hx of      Eczema No family hx of      Melanoma No family hx of          Current Outpatient Medications   Medication Sig Dispense Refill     clotrimazole (LOTRIMIN) 1 % external cream Apply topically 2 times daily 30 g 3     ferrous sulfate (SLO-FE) 142 (45 FE) MG TBCR Take 1 tablet (142 mg) by mouth daily 90 tablet 1     Allergies   Allergen Reactions     Nkda [No Known Drug Allergies]          Reviewed and updated as needed this visit by Provider         Review of Systems   Constitutional, HEENT, cardiovascular, pulmonary, gi and gu systems are negative, except as otherwise noted.      Objective    /85   Pulse 100   Temp 98.1  F (36.7  C) (Tympanic)   Resp 22   Wt 80.3 kg (177 lb)   LMP 06/09/2020   SpO2 98%   BMI 30.38 kg/m    Body mass index is 30.38 kg/m .  Physical Exam   GENERAL: healthy, alert and no distress  EYES: Eyes grossly normal to inspection, PERRL and conjunctivae and sclerae normal  NECK: no adenopathy, no asymmetry, masses, or scars and thyroid normal to palpation  RESP: lungs clear to auscultation - no rales, rhonchi or wheezes  CV: regular rate and rhythm, normal S1 S2, no S3 or S4, no murmur, click or rub, no peripheral edema and peripheral pulses strong  ABDOMEN: soft, non-distended, tenderness in the RUQ and bowel sounds normal  SKIN: no suspicious lesions or rashes    Diagnostic Test Results:  Labs reviewed in Epic  Previous Abdominal US from External facility completed on  1/11/2020 reviewed.         Assessment & Plan     Arcelia was seen today for abdominal pain.    Diagnoses and all orders for this visit:    Right upper quadrant pain, persistent   -     MR Abdomen w/o & w Contrast; Future  -     GENERAL SURG ADULT REFERRAL; Future  -     Hepatic panel (Albumin, ALT, AST, Bili, Alk Phos, TP)  -     Amylase  -     Lipase  -     CBC with platelets    Dilated extrahepatic bile duct  -     MR Abdomen w/o & w Contrast; Future  -     GENERAL SURG ADULT REFERRAL; Future       Will notify her with results.     Return in about 1 week (around 7/6/2020), or if symptoms worsen or fail to improve.    Simona Aviles MD  [facility]

## 2020-06-30 ENCOUNTER — HOSPITAL ENCOUNTER (OUTPATIENT)
Dept: MRI IMAGING | Facility: CLINIC | Age: 50
Discharge: HOME | End: 2020-06-30
Attending: FAMILY MEDICINE | Admitting: FAMILY MEDICINE
Payer: COMMERCIAL

## 2020-06-30 DIAGNOSIS — K83.8 DILATED BILE DUCT: ICD-10-CM

## 2020-06-30 DIAGNOSIS — R10.11 RIGHT UPPER QUADRANT PAIN: ICD-10-CM

## 2020-06-30 PROCEDURE — 74183 MRI ABD W/O CNTR FLWD CNTR: CPT

## 2020-06-30 PROCEDURE — 25500064 ZZH RX 255 OP 636: Performed by: FAMILY MEDICINE

## 2020-06-30 PROCEDURE — A9585 GADOBUTROL INJECTION: HCPCS | Performed by: FAMILY MEDICINE

## 2020-06-30 RX ORDER — GADOBUTROL 604.72 MG/ML
8 INJECTION INTRAVENOUS ONCE
Status: COMPLETED | OUTPATIENT
Start: 2020-06-30 | End: 2020-06-30

## 2020-06-30 RX ADMIN — GADOBUTROL 8 ML: 604.72 INJECTION INTRAVENOUS at 19:05

## 2020-07-02 ENCOUNTER — OFFICE VISIT (OUTPATIENT)
Dept: SURGERY | Facility: CLINIC | Age: 50
End: 2020-07-02
Payer: COMMERCIAL

## 2020-07-02 VITALS
WEIGHT: 177 LBS | HEART RATE: 77 BPM | SYSTOLIC BLOOD PRESSURE: 120 MMHG | HEIGHT: 64 IN | DIASTOLIC BLOOD PRESSURE: 74 MMHG | BODY MASS INDEX: 30.22 KG/M2

## 2020-07-02 DIAGNOSIS — K82.8 GALLBLADDER SLUDGE: Primary | ICD-10-CM

## 2020-07-02 PROCEDURE — 99204 OFFICE O/P NEW MOD 45 MIN: CPT | Performed by: SURGERY

## 2020-07-02 ASSESSMENT — MIFFLIN-ST. JEOR: SCORE: 1402.87

## 2020-07-02 NOTE — PROGRESS NOTES
Assessment and Plan:  It is my impression that Arcelia has gallbladder sludge that could be causing her pain.  I have offered her a laparoscopic cholecystectomy.      We have discussed the indication, alternatives, risks and expected recovery.  Specifically we have discussed incisions, scarring, postoperative infections, anesthesia, bleeding, blood transfusion, open conversion, common bile duct injury, injury to intra-abdominal organs, adhesions that can lead to bowel obstruction, retained common bile duct stone, bile leak, DVT, PE, hernia, post cholecystectomy diarrhea, postoperative dietary restrictions and physical limitations.  We have discussed the recommended interventions and treatments for these complications.  All questions have been answered to the best of my ability. We also discussed that the sludge may not be causing her pain and that removing her gallbladder may not relieve her pain. She would like to proceed with surgery.     We will schedule surgery at the patient's convenience.      Chief complaint:  Abdominal pain, right upper quadrant    HPI:  This patient is a 50 year old female who presents with intermittent right upper quadrant pain for 6 months.  The pain is not associated with eating any type of food that she noticed.  Positive for associated symptoms of nausea.  Negative for associated symptoms of vomiting.  She does not have a history of jaundice or dark urine.  She  has not had pancreatitis in the past.      Nothing seems to make the pain better or worse. She tried omeprazole without relief. When discussing the results of her US and talking about previous ultrasounds patient reported that she ha had the previous US's for abdominal pain but no cause was found. The previous pain was similar to her current pain.     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   Social Needs     Financial resource strain: Not on file     Food insecurity     Worry: Not on file     Inability: Not on file     Transportation needs     Medical: Not on file     Non-medical: Not on file   Tobacco Use     Smoking status: Never Smoker     Smokeless tobacco: Never Used     Tobacco comment: Lives in smoke free household   Substance and Sexual Activity     Alcohol use: No     Drug use: No     Sexual activity: Yes     Partners: Male   Lifestyle     Physical activity     Days per week: Not on file     Minutes per session: Not on file     Stress: Not on file   Relationships     Social connections     Talks on phone: Not on file     Gets together: Not on file     Attends Judaism service: Not on file     Active member of club or organization: Not on file     Attends meetings of clubs or organizations: Not on file     Relationship status: Not on file     Intimate partner violence     Fear of current or ex partner: Not on file     Emotionally abused: Not on file     Physically abused: Not on file     Forced sexual activity: Not on file   Other Topics Concern     Parent/sibling w/ CABG, MI or angioplasty before 65F 55M? No   Social History Narrative     Not on file        Family History:  Family History   Problem  "Relation Age of Onset     Asthma Father      Diabetes Father      Glaucoma No family hx of      Macular Degeneration No family hx of      Thyroid Disease No family hx of      Cerebrovascular Disease No family hx of      Cancer No family hx of      Hypertension No family hx of      C.A.D. No family hx of      Lupus No family hx of      Thrombophilia No family hx of      Psoriasis No family hx of      Eczema No family hx of      Melanoma No family hx of        Review of Systems:  The 10 point review of systems is negative other than noted in the HPI and above.    Physical Exam:  /74   Pulse 77   Ht 1.626 m (5' 4\")   Wt 80.3 kg (177 lb)   LMP 06/09/2020   BMI 30.38 kg/m    Constitutional: healthy, alert and no distress  Eyes: Pupils round and equal, no icterus   ENT: Mucous membranes moist  Respiratory:  Non-labored respiration  Gastrointestinal: Abdomen soft, non-tender. BS normal. No masses, organomegaly  Musculoskeletal: No gross deformity  Neurologic: No gross focal deficits  Psychiatric: mentation appears normal and affect normal/bright  Hematologic/Lymphatic/Immunologic: No bruising noted  Skin: No lesions, rashes, erythema or jaundice noted      Relevant labs:    WBC -   WBC   Date Value Ref Range Status   06/29/2020 3.6 (L) 4.0 - 11.0 10e9/L Final   ], HgB -   Hemoglobin   Date Value Ref Range Status   06/29/2020 12.6 11.7 - 15.7 g/dL Final   ]  Liver Function Studies -   Recent Labs   Lab Test 06/29/20  1510   PROTTOTAL 7.7   ALBUMIN 3.8   BILITOTAL 0.4   ALKPHOS 101   AST 16   ALT 22       Imaging:    Recent Results (from the past 744 hour(s))   MR Abdomen w/o & w Contrast    Narrative    MR ABDOMEN WITHOUT AND WITH CONTRAST   6/30/2020 7:06 PM     HISTORY: RUQ pain, no fever, no elevated WBC. Right upper quadrant  pain. Dilated bile duct.    TECHNIQUE: Multiplanar, multiecho MRI was performed using T1, T2, and  in- and out-of-phase imaging. Pre- and postcontrast T1 images were  acquired after the " administration of 8 mL Gadavist IV. MRCP images and  coronal 3-D thin section MRCP images, were acquired on the scanner  through the biliary tree. 3-D image reformatting was performed on the  acquisition scanner.    COMPARISON: Abdominal ultrasound on 8/9/2019 and CT abdomen and pelvis  on 8/14/2018.    FINDINGS: Image quality are degraded by motion/respiratory artifact.    Hepatobiliary: No suspicious focal hepatic lesion. No intrahepatic  biliary dilatation. Mild prominence of the common bile duct measuring  8 mm in diameter. No ductal stone visualized. T1 hyperattenuating  material in the gallbladder, could represent gallbladder sludge.    Pancreas: Mild diffuse prominence of the main pancreatic duct. No  solid pancreatic mass visualized.    Spleen: No splenomegaly.    Adrenal glands: No adrenal nodules.    Kidneys: No hydronephrosis or solid renal mass in either kidney.    Remainder of the abdomen: No abnormally dilated bowel loops in the  visualized abdomen. No free fluid in the visualized abdomen. No  significant lymphadenopathy in the upper abdomen.    Bones and soft tissue: No suspicious osseous lesion.      Impression    IMPRESSION:   1. No MRI findings to explain patient's symptoms of right upper  quadrant abdominal pain. No evidence of common bile duct stone.  2. The gallbladder is filled with T1 hyperintensity material, likely  represents gallbladder sludge.    MD Javier MARTIN, DO

## 2020-07-02 NOTE — LETTER
7/2/2020         RE: Arcelia Liz  [address] [facility] [address]        Dear Colleague,    Thank you for referring your patient, Arcelia Liz, to the [facility]. Please see a copy of my visit note below.    Assessment and Plan:  It is my impression that Arcelia has gallbladder sludge that could be causing her pain.  I have offered her a laparoscopic cholecystectomy.      We have discussed the indication, alternatives, risks and expected recovery.  Specifically we have discussed incisions, scarring, postoperative infections, anesthesia, bleeding, blood transfusion, open conversion, common bile duct injury, injury to intra-abdominal organs, adhesions that can lead to bowel obstruction, retained common bile duct stone, bile leak, DVT, PE, hernia, post cholecystectomy diarrhea, postoperative dietary restrictions and physical limitations.  We have discussed the recommended interventions and treatments for these complications.  All questions have been answered to the best of my ability. We also discussed that the sludge may not be causing her pain and that removing her gallbladder may not relieve her pain. She would like to proceed with surgery.     We will schedule surgery at the patient's convenience.      Chief complaint:  Abdominal pain, right upper quadrant    HPI:  This patient is a 50 year old female who presents with intermittent right upper quadrant pain for 6 months.  The pain is not associated with eating any type of food that she noticed.  Positive for associated symptoms of nausea.  Negative for associated symptoms of vomiting.  She does not have a history of jaundice or dark urine.  She  has not had pancreatitis in the past.      Nothing seems to make the pain better or worse. She tried omeprazole without relief. When discussing the results of her US and talking about previous ultrasounds patient reported that she ha had the previous US's for abdominal pain but no cause was found. The  previous pain was similar to her current pain.     Past Medical History:   has a past medical history of Anemia (8/11/2015), Eczema, Fertility problem, History of vitamin D deficiency, Inflammatory acne (8/11/2015), and LTBI (latent tuberculosis infection) (1990s).    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   Social Needs     Financial resource strain: Not on file     Food insecurity     Worry: Not on file     Inability: Not on file     Transportation needs     Medical: Not on file     Non-medical: Not on file   Tobacco Use     Smoking status: Never Smoker     Smokeless tobacco: Never Used     Tobacco comment: Lives in smoke free household   Substance and Sexual Activity     Alcohol use: No     Drug use: No     Sexual activity: Yes     Partners: Male   Lifestyle     Physical activity     Days per week: Not on file     Minutes per session: Not on file     Stress: Not on file   Relationships     Social connections     Talks on phone: Not on file     Gets together: Not on file     Attends Congregation service: Not on file     Active member of club or organization: Not on file     Attends meetings of clubs or organizations: Not on file     Relationship status: Not on file     Intimate partner violence     Fear of current or ex partner: Not on file     Emotionally abused: Not on file     Physically abused:  "Not on file     Forced sexual activity: Not on file   Other Topics Concern     Parent/sibling w/ CABG, MI or angioplasty before 65F 55M? No   Social History Narrative     Not on file        Family History:  Family History   Problem Relation Age of Onset     Asthma Father      Diabetes Father      Glaucoma No family hx of      Macular Degeneration No family hx of      Thyroid Disease No family hx of      Cerebrovascular Disease No family hx of      Cancer No family hx of      Hypertension No family hx of      C.A.D. No family hx of      Lupus No family hx of      Thrombophilia No family hx of      Psoriasis No family hx of      Eczema No family hx of      Melanoma No family hx of        Review of Systems:  The 10 point review of systems is negative other than noted in the HPI and above.    Physical Exam:  /74   Pulse 77   Ht 1.626 m (5' 4\")   Wt 80.3 kg (177 lb)   LMP 06/09/2020   BMI 30.38 kg/m    Constitutional: healthy, alert and no distress  Eyes: Pupils round and equal, no icterus   ENT: Mucous membranes moist  Respiratory:  Non-labored respiration  Gastrointestinal: Abdomen soft, non-tender. BS normal. No masses, organomegaly  Musculoskeletal: No gross deformity  Neurologic: No gross focal deficits  Psychiatric: mentation appears normal and affect normal/bright  Hematologic/Lymphatic/Immunologic: No bruising noted  Skin: No lesions, rashes, erythema or jaundice noted      Relevant labs:    WBC -   WBC   Date Value Ref Range Status   06/29/2020 3.6 (L) 4.0 - 11.0 10e9/L Final   ], HgB -   Hemoglobin   Date Value Ref Range Status   06/29/2020 12.6 11.7 - 15.7 g/dL Final   ]  Liver Function Studies -   Recent Labs   Lab Test 06/29/20  1510   PROTTOTAL 7.7   ALBUMIN 3.8   BILITOTAL 0.4   ALKPHOS 101   AST 16   ALT 22       Imaging:    Recent Results (from the past 744 hour(s))   MR Abdomen w/o & w Contrast    Narrative    MR ABDOMEN WITHOUT AND WITH CONTRAST   6/30/2020 7:06 PM     HISTORY: RUQ pain, no " fever, no elevated WBC. Right upper quadrant  pain. Dilated bile duct.    TECHNIQUE: Multiplanar, multiecho MRI was performed using T1, T2, and  in- and out-of-phase imaging. Pre- and postcontrast T1 images were  acquired after the administration of 8 mL Gadavist IV. MRCP images and  coronal 3-D thin section MRCP images, were acquired on the scanner  through the biliary tree. 3-D image reformatting was performed on the  acquisition scanner.    COMPARISON: Abdominal ultrasound on 8/9/2019 and CT abdomen and pelvis  on 8/14/2018.    FINDINGS: Image quality are degraded by motion/respiratory artifact.    Hepatobiliary: No suspicious focal hepatic lesion. No intrahepatic  biliary dilatation. Mild prominence of the common bile duct measuring  8 mm in diameter. No ductal stone visualized. T1 hyperattenuating  material in the gallbladder, could represent gallbladder sludge.    Pancreas: Mild diffuse prominence of the main pancreatic duct. No  solid pancreatic mass visualized.    Spleen: No splenomegaly.    Adrenal glands: No adrenal nodules.    Kidneys: No hydronephrosis or solid renal mass in either kidney.    Remainder of the abdomen: No abnormally dilated bowel loops in the  visualized abdomen. No free fluid in the visualized abdomen. No  significant lymphadenopathy in the upper abdomen.    Bones and soft tissue: No suspicious osseous lesion.      Impression    IMPRESSION:   1. No MRI findings to explain patient's symptoms of right upper  quadrant abdominal pain. No evidence of common bile duct stone.  2. The gallbladder is filled with T1 hyperintensity material, likely  represents gallbladder sludge.    MD Javier MARTIN DO      Again, thank you for allowing me to participate in the care of your patient.        Sincerely,        Javier Figueroa DO

## 2020-07-07 ENCOUNTER — TELEPHONE (OUTPATIENT)
Dept: SURGERY | Facility: CLINIC | Age: 50
End: 2020-07-07

## 2020-07-21 ENCOUNTER — OFFICE VISIT (OUTPATIENT)
Dept: FAMILY MEDICINE | Facility: CLINIC | Age: 50
End: 2020-07-21
Payer: COMMERCIAL

## 2020-07-21 VITALS
WEIGHT: 179 LBS | DIASTOLIC BLOOD PRESSURE: 89 MMHG | SYSTOLIC BLOOD PRESSURE: 124 MMHG | RESPIRATION RATE: 16 BRPM | HEART RATE: 83 BPM | BODY MASS INDEX: 30.73 KG/M2 | TEMPERATURE: 97.9 F | OXYGEN SATURATION: 97 %

## 2020-07-21 DIAGNOSIS — K82.8 GALLBLADDER SLUDGE: Primary | ICD-10-CM

## 2020-07-21 PROCEDURE — 99213 OFFICE O/P EST LOW 20 MIN: CPT | Performed by: FAMILY MEDICINE

## 2020-07-21 NOTE — PROGRESS NOTES
Subjective     Arcelia Liz is a 50 year old female who presents to clinic today for the following health issues:    HPI     MRI Results   Discuss MRI done 6/30/20  Reporting improvement in abdominal pains.     Abdo MRI reported Gallbladder sludge, no other findings.   Patient was seen and evaluated by General Surg and recommended to proceed with a Cholecystectomy.   Patient was unsure if she should proceed and wanted to seek a second opinion today.       HEALTH CARE MAINTENANCE: due for a Physical with a Pap.     Patient Active Problem List   Diagnosis     CARDIOVASCULAR SCREENING; LDL GOAL LESS THAN 160     Epigastric pain     Mantoux: positive     Inflammatory acne     Comedonal acne     Anemia     Past Surgical History:   Procedure Laterality Date     COLONOSCOPY  9/6/2012    Procedure: COLONOSCOPY;  COLONOSCOPY, CHRONIC LOWER ABD PAIN;  Surgeon: Jordan Mitchell MD;  Location: [facility] OR     DILATION AND CURETTAGE  2005    MAB, twin gestation       Social History     Tobacco Use     Smoking status: Never Smoker     Smokeless tobacco: Never Used     Tobacco comment: Lives in smoke free household   Substance Use Topics     Alcohol use: No     Family History   Problem Relation Age of Onset     Asthma Father      Diabetes Father      Glaucoma No family hx of      Macular Degeneration No family hx of      Thyroid Disease No family hx of      Cerebrovascular Disease No family hx of      Cancer No family hx of      Hypertension No family hx of      C.A.D. No family hx of      Lupus No family hx of      Thrombophilia No family hx of      Psoriasis No family hx of      Eczema No family hx of      Melanoma No family hx of          Current Outpatient Medications   Medication Sig Dispense Refill     clotrimazole (LOTRIMIN) 1 % external cream Apply topically 2 times daily 30 g 3     ferrous sulfate (SLO-FE) 142 (45 FE) MG TBCR Take 1 tablet (142 mg) by mouth daily 90 tablet 1     Allergies   Allergen Reactions     Nkda  [No Known Drug Allergies]        Reviewed and updated as needed this visit by Provider         Review of Systems   Constitutional, HEENT, cardiovascular, pulmonary, gi and gu systems are negative, except as otherwise noted.      Objective    /89   Pulse 83   Temp 97.9  F (36.6  C) (Tympanic)   Resp 16   Wt 81.2 kg (179 lb)   SpO2 97%   Breastfeeding No   BMI 30.73 kg/m    Body mass index is 30.73 kg/m .  Physical Exam   GENERAL: healthy, alert and no distress  PSYCH: mentation appears normal, affect normal/bright    Diagnostic Test Results:  Labs reviewed in Epic  Reviewed and discussed recent MRI Abdo findings    MR ABDOMEN WITHOUT AND WITH CONTRAST   6/30/2020 7:06 PM      HISTORY: RUQ pain, no fever, no elevated WBC. Right upper quadrant  pain. Dilated bile duct.     TECHNIQUE: Multiplanar, multiecho MRI was performed using T1, T2, and  in- and out-of-phase imaging. Pre- and postcontrast T1 images were  acquired after the administration of 8 mL Gadavist IV. MRCP images and  coronal 3-D thin section MRCP images, were acquired on the scanner  through the biliary tree. 3-D image reformatting was performed on the  acquisition scanner.     COMPARISON: Abdominal ultrasound on 8/9/2019 and CT abdomen and pelvis  on 8/14/2018.     FINDINGS: Image quality are degraded by motion/respiratory artifact.     Hepatobiliary: No suspicious focal hepatic lesion. No intrahepatic  biliary dilatation. Mild prominence of the common bile duct measuring  8 mm in diameter. No ductal stone visualized. T1 hyperattenuating  material in the gallbladder, could represent gallbladder sludge.     Pancreas: Mild diffuse prominence of the main pancreatic duct. No  solid pancreatic mass visualized.     Spleen: No splenomegaly.     Adrenal glands: No adrenal nodules.     Kidneys: No hydronephrosis or solid renal mass in either kidney.     Remainder of the abdomen: No abnormally dilated bowel loops in the  visualized abdomen. No free  fluid in the visualized abdomen. No  significant lymphadenopathy in the upper abdomen.     Bones and soft tissue: No suspicious osseous lesion.                                                                      IMPRESSION:   1. No MRI findings to explain patient's symptoms of right upper  quadrant abdominal pain. No evidence of common bile duct stone.  2. The gallbladder is filled with T1 hyperintensity material, likely  represents gallbladder sludge.     KAMINI FARIA MD        Assessment & Plan     Arcelia was seen today for abdominal pain.    Diagnoses and all orders for this visit:    Gallbladder sludge  Discussed recent MRI Abdo findings with patient. Questions answered.   Since patient has had intermittent abdominal pain over the past 6 months once prompting an ER visit and the other an office visit- recommended too proceed with the Lap Cholecystectomy. Patient plans to contact her Surgical Team to set a date.         Return in about 17 days (around 8/7/2020) for Physical Exam.    Simona Aviles MD  [facility]

## 2020-07-21 NOTE — PATIENT INSTRUCTIONS
Call Surgical Team and schedule the Cholecystectomy at your earliest convenience.      Patient Education     Having Laparoscopic Cholecystectomy  Small incisions are made in your belly (abdomen). The scope is put through one of the incisions. Surgical tools are put through other incisions.  Small clips are used to close off the connection between the gallbladder and the bile duct. The gallbladder is then detached from the liver.  The gallbladder is removed through one of the incisions. Bile still flows from the liver to the small intestine.  When the surgery is done, all tools are removed. Incisions are closed with stitches (sutures) or staples. Sometimes, a laparoscopic surgery may need to be changed to an open surgery. This method uses one large incision. This change may happen because of scar tissue, unusual anatomy, or for some other reason.     Possible incision sites.   You ve had painful attacks caused by gallstones. Because of this, you are having surgery to remove your gallbladder. This is called cholecystectomy. A method called laparoscopy will be used. This allows surgery to be done through a few small cuts (incisions).  Before your surgery    Tell your provider what medicines you take. This includes prescription medicines, over-the-counter medicines, street drugs, herbs, vitamins, and other supplements. Be sure to mention if you take prescription blood thinners. This includes warfarin, clopidogrel, ibuprofen, and aspirin.    If you drink alcohol, tell your provider how much you drink. This is very important if you are a heavy drinker or have had alcohol withdrawal symptoms in the past. Alcohol withdrawal can be dangerous. But the symptoms can be safely managed if your healthcare provider knows your alcohol history.    Have any tests your provider asks for, such as blood tests.    Don t eat or drink after midnight the night before your surgery. This includes water, coffee, and mints.    The day of  surgery    Your provider may have you take your normal medicine with a sip of water. Check with your provider.   When you arrive, you will prepare for surgery:    An IV (intravenous) line will be put into a vein in your arm or hand. This gives you fluids and medicine.    An anesthesiologist will talk with you about anesthesia. This is medicine used to prevent pain. You will receive general anesthesia. This puts you into a deep sleep-like state through the procedure.  During laparoscopic surgery  For this surgery, a thin tube with a tiny camera is used. This is called a laparoscope.The scope sends images from inside your body to a video screen. This lets the surgeon view and work on your gallbladder:    Small incisions are made in your belly (abdomen). The scope is put through one of the incisions. Surgical tools are put through other incisions.    Small clips are used to close off the connection between the gallbladder and the bile duct. The gallbladder is then detached from the liver.    The gallbladder is removed through one of the incisions. Bile still flows from the liver to the small intestine.    When the surgery is done, all tools are removed. Incisions are closed with stitches (sutures) or staples. Sometimes, a laparoscopic surgery may need to be changed to an open surgery. This method uses one large incision. This change may happen because of scar tissue, unusual anatomy, or for some other reason.  After surgery  You will be sent to a room to wake up from the anesthesia. You will likely go home the same day. In some cases, an overnight stay is needed. When you are released to go home, have a family member or friend ready to drive you.  All surgeries have risks. The risks of gallbladder surgery include:    Bleeding    Infection    Injury to the common bile duct or nearby organs    Blood clots in the legs    Bile leaks    Hernia at incision site    Pneumonia  Date Last Reviewed: 7/1/2016 2000-2019 [employer]. [address] [place] [address], [place] [address]. All rights reserved. This information is not intended as a substitute for professional medical care. Always follow your healthcare professional's instructions.

## 2020-08-07 ENCOUNTER — OFFICE VISIT (OUTPATIENT)
Dept: FAMILY MEDICINE | Facility: CLINIC | Age: 50
End: 2020-08-07
Payer: COMMERCIAL

## 2020-08-07 VITALS
BODY MASS INDEX: 31.24 KG/M2 | SYSTOLIC BLOOD PRESSURE: 126 MMHG | HEART RATE: 89 BPM | DIASTOLIC BLOOD PRESSURE: 87 MMHG | WEIGHT: 182 LBS | TEMPERATURE: 99.3 F

## 2020-08-07 DIAGNOSIS — Z11.59 ENCOUNTER FOR SCREENING FOR OTHER VIRAL DISEASES: Primary | ICD-10-CM

## 2020-08-07 DIAGNOSIS — Z13.220 LIPID SCREENING: ICD-10-CM

## 2020-08-07 DIAGNOSIS — Z00.00 ROUTINE GENERAL MEDICAL EXAMINATION AT HEALTH CARE FACILITY: Primary | ICD-10-CM

## 2020-08-07 DIAGNOSIS — Z20.822 EXPOSURE TO COVID-19 VIRUS: ICD-10-CM

## 2020-08-07 DIAGNOSIS — Z12.4 CERVICAL CANCER SCREENING: ICD-10-CM

## 2020-08-07 PROBLEM — K82.8 GALLBLADDER SLUDGE: Status: ACTIVE | Noted: 2020-08-07

## 2020-08-07 PROCEDURE — G0145 SCR C/V CYTO,THINLAYER,RESCR: HCPCS | Performed by: FAMILY MEDICINE

## 2020-08-07 PROCEDURE — G0476 HPV COMBO ASSAY CA SCREEN: HCPCS | Performed by: FAMILY MEDICINE

## 2020-08-07 PROCEDURE — 99396 PREV VISIT EST AGE 40-64: CPT | Performed by: FAMILY MEDICINE

## 2020-08-07 NOTE — PROGRESS NOTES
SUBJECTIVE:   CC: Arcelia Liz is an 50 year old woman who presents for preventive health visit.     Healthy Habits:    Do you get at least three servings of calcium containing foods daily (dairy, green leafy vegetables, etc.)? No    Amount of exercise or daily activities, outside of work: None    Problems taking medications regularly No    Medication side effects: No    Have you had an eye exam in the past two years? yes    Do you see a dentist twice per year? yes    Do you have sleep apnea, excessive snoring or daytime drowsiness?no      PROBLEMS TO ADD ON...  Patient states that she was recently exposed to a family member with COVID. Denies having any symptoms at this time.     Patient seen in the Isolation room with full PPE.     HEALTH CARE MAINTENANCE: Due for a Pap today.     Patient informed that anything we discuss that is not related to preventative medicine, may be billed for; patient verbalizes understanding.  -------------------------------------    Today's PHQ-2 Score:   PHQ-2 ( 1999 Pfizer) 8/7/2020 2/28/2020   Q1: Little interest or pleasure in doing things 0 0   Q2: Feeling down, depressed or hopeless 0 0   PHQ-2 Score 0 0       Abuse: Current or Past(Physical, Sexual or Emotional)- No  Do you feel safe in your environment? Yes        Social History     Tobacco Use     Smoking status: Never Smoker     Smokeless tobacco: Never Used     Tobacco comment: Lives in smoke free household   Substance Use Topics     Alcohol use: No     If you drink alcohol do you typically have >3 drinks per day or >7 drinks per week? No                     Reviewed orders with patient.  Reviewed health maintenance and updated orders accordingly - Yes  Lab work is in process  Labs reviewed in EPIC  BP Readings from Last 3 Encounters:   08/07/20 126/87   07/21/20 124/89   07/02/20 120/74    Wt Readings from Last 3 Encounters:   08/07/20 82.6 kg (182 lb)   07/21/20 81.2 kg (179 lb)   07/02/20 80.3 kg (177 lb)                   Patient Active Problem List   Diagnosis     CARDIOVASCULAR SCREENING; LDL GOAL LESS THAN 160     Epigastric pain     Mantoux: positive     Inflammatory acne     Comedonal acne     Anemia     Gallbladder sludge     Past Surgical History:   Procedure Laterality Date     COLONOSCOPY  9/6/2012    Procedure: COLONOSCOPY;  COLONOSCOPY, CHRONIC LOWER ABD PAIN;  Surgeon: Jordan Mitchell MD;  Location: [facility]     DILATION AND CURETTAGE  2005    MAB, twin gestation       Social History     Tobacco Use     Smoking status: Never Smoker     Smokeless tobacco: Never Used     Tobacco comment: Lives in smoke free household   Substance Use Topics     Alcohol use: No     Family History   Problem Relation Age of Onset     Asthma Father      Diabetes Father      Glaucoma No family hx of      Macular Degeneration No family hx of      Thyroid Disease No family hx of      Cerebrovascular Disease No family hx of      Cancer No family hx of      Hypertension No family hx of      C.A.D. No family hx of      Lupus No family hx of      Thrombophilia No family hx of      Psoriasis No family hx of      Eczema No family hx of      Melanoma No family hx of          Current Outpatient Medications   Medication Sig Dispense Refill     clotrimazole (LOTRIMIN) 1 % external cream Apply topically 2 times daily 30 g 3     ferrous sulfate (SLO-FE) 142 (45 FE) MG TBCR Take 1 tablet (142 mg) by mouth daily 90 tablet 1     Allergies   Allergen Reactions     Nkda [No Known Drug Allergies]        Mammogram Screening: Patient over age 50, mutual decision to screen reflected in health maintenance.    Pertinent mammograms are reviewed under the imaging tab.  History of abnormal Pap smear: NO - age 30-65 PAP every 5 years with negative HPV co-testing recommended  PAP / HPV Latest Ref Rng & Units 4/10/2017 10/18/2013 11/11/2010   PAP - NIL NIL NIL   HPV 16 DNA NEG Negative - -   HPV 18 DNA NEG Negative - -   OTHER HR HPV NEG Negative - -     Reviewed  and updated as needed this visit by clinical staff  Tobacco  Allergies  Meds         Reviewed and updated as needed this visit by Provider            ROS:  CONSTITUTIONAL: NEGATIVE for fever, chills, change in weight  INTEGUMENTARY/SKIN: NEGATIVE for worrisome rashes, moles or lesions  EYES: NEGATIVE for vision changes or irritation  ENT: NEGATIVE for ear, mouth and throat problems  RESP: NEGATIVE for significant cough or SOB  BREAST: NEGATIVE for masses, tenderness or discharge  CV: NEGATIVE for chest pain, palpitations or peripheral edema  GI: NEGATIVE for nausea, abdominal pain, heartburn, or change in bowel habits  : NEGATIVE for unusual urinary or vaginal symptoms. No vaginal bleeding.  MUSCULOSKELETAL: NEGATIVE for significant arthralgias or myalgia  NEURO: NEGATIVE for weakness, dizziness or paresthesias  PSYCHIATRIC: NEGATIVE for changes in mood or affect     OBJECTIVE:   /87 (BP Location: Right arm, Patient Position: Sitting, Cuff Size: Adult Large)   Pulse 89   Temp 99.3  F (37.4  C) (Oral)   Wt 82.6 kg (182 lb)   LMP 07/15/2020   BMI 31.24 kg/m    EXAM:  GENERAL APPEARANCE: healthy, alert and no distress  EYES: Eyes grossly normal to inspection, PERRL and conjunctivae and sclerae normal  HENT: ear canals and TM's normal, nose and mouth without ulcers or lesions, oropharynx clear and oral mucous membranes moist  NECK: no adenopathy, no asymmetry, masses, or scars and thyroid normal to palpation  RESP: lungs clear to auscultation - no rales, rhonchi or wheezes  BREAST: normal without masses, tenderness or nipple discharge and no palpable axillary masses or adenopathy  CV: regular rate and rhythm, normal S1 S2, no S3 or S4, no murmur, click or rub, no peripheral edema and peripheral pulses strong  ABDOMEN: soft, nontender, no hepatosplenomegaly, no masses and bowel sounds normal   (female): normal female external genitalia, normal urethral meatus, vaginal mucosal atrophy noted, normal  "cervix, adnexae, and uterus without masses or abnormal discharge. Pap smear done.   MS: no musculoskeletal defects are noted and gait is age appropriate without ataxia  SKIN: no suspicious lesions or rashes  NEURO: Normal strength and tone, sensory exam grossly normal, mentation intact and speech normal  PSYCH: mentation appears normal and affect normal/bright    Diagnostic Test Results:  Labs reviewed in Epic    ASSESSMENT/PLAN:   Arcelia was seen today for physical.    Diagnoses and all orders for this visit:    Routine general medical examination at health care facility    Cervical cancer screening  -     Pap imaged thin layer screen with HPV - recommended age 30 - 65 years (select HPV order below)  -     HPV High Risk Types DNA Cervical    Exposure to COVID-19 virus  -     Asymptomatic COVID-19 Virus (Coronavirus) by PCR; Future    Lipid screening  -     Lipid Profile; Future        COUNSELING:   Reviewed preventive health counseling, as reflected in patient instructions       Regular exercise       Healthy diet/nutrition    Estimated body mass index is 31.24 kg/m  as calculated from the following:    Height as of 7/2/20: 1.626 m (5' 4\").    Weight as of this encounter: 82.6 kg (182 lb).    Weight management plan: Discussed healthy diet and exercise guidelines     reports that she has never smoked. She has never used smokeless tobacco.      Counseling Resources:  ATP IV Guidelines  Pooled Cohorts Equation Calculator  Breast Cancer Risk Calculator  FRAX Risk Assessment  ICSI Preventive Guidelines  Dietary Guidelines for Americans, 2010  USDA's MyPlate  ASA Prophylaxis  Lung CA Screening    Follow up annually and as needed thoughout the year.    Simona Aviles MD  [facility] [place]  "

## 2020-08-07 NOTE — TELEPHONE ENCOUNTER
Type of surgery: Laparoscopic cholecystectomy   CPT 06109   Gallbladder sludge  K82.8  Location of surgery: [facility]  Date and time of surgery: 08/25/2020  Surgeon: Henry  Pre-Op Appt Date: 08/18/2020  Post-Op Appt Date: 09/10/2020   Packet sent out: Yes  Pre-cert/Authorization completed:  No prior auth needed, per [facility] online list.   Date: 08/07/2020    Thank you,   Isabelle Muniz   [facility] Department  [phone]

## 2020-08-11 LAB
COPATH REPORT: NORMAL
PAP: NORMAL

## 2020-08-12 LAB
FINAL DIAGNOSIS: NORMAL
HPV HR 12 DNA CVX QL NAA+PROBE: NEGATIVE
HPV16 DNA SPEC QL NAA+PROBE: NEGATIVE
HPV18 DNA SPEC QL NAA+PROBE: NEGATIVE
SPECIMEN DESCRIPTION: NORMAL
SPECIMEN SOURCE CVX/VAG CYTO: NORMAL

## 2020-09-01 ENCOUNTER — OFFICE VISIT (OUTPATIENT)
Dept: FAMILY MEDICINE | Facility: CLINIC | Age: 50
End: 2020-09-01
Payer: COMMERCIAL

## 2020-09-01 VITALS
DIASTOLIC BLOOD PRESSURE: 84 MMHG | HEART RATE: 86 BPM | BODY MASS INDEX: 31.65 KG/M2 | OXYGEN SATURATION: 98 % | RESPIRATION RATE: 22 BRPM | SYSTOLIC BLOOD PRESSURE: 122 MMHG | WEIGHT: 184.4 LBS | TEMPERATURE: 97.3 F

## 2020-09-01 DIAGNOSIS — Z01.818 PREOP GENERAL PHYSICAL EXAM: Primary | ICD-10-CM

## 2020-09-01 DIAGNOSIS — K82.8 GALLBLADDER SLUDGE: ICD-10-CM

## 2020-09-01 LAB
ANION GAP SERPL CALCULATED.3IONS-SCNC: 4 MMOL/L (ref 3–14)
BUN SERPL-MCNC: 12 MG/DL (ref 7–30)
CALCIUM SERPL-MCNC: 8.8 MG/DL (ref 8.5–10.1)
CHLORIDE SERPL-SCNC: 110 MMOL/L (ref 94–109)
CO2 SERPL-SCNC: 25 MMOL/L (ref 20–32)
CREAT SERPL-MCNC: 0.62 MG/DL (ref 0.52–1.04)
ERYTHROCYTE [DISTWIDTH] IN BLOOD BY AUTOMATED COUNT: 15 % (ref 10–15)
GFR SERPL CREATININE-BSD FRML MDRD: >90 ML/MIN/{1.73_M2}
GLUCOSE SERPL-MCNC: 104 MG/DL (ref 70–99)
HCT VFR BLD AUTO: 38.1 % (ref 35–47)
HGB BLD-MCNC: 12.5 G/DL (ref 11.7–15.7)
MCH RBC QN AUTO: 28.1 PG (ref 26.5–33)
MCHC RBC AUTO-ENTMCNC: 32.8 G/DL (ref 31.5–36.5)
MCV RBC AUTO: 86 FL (ref 78–100)
PLATELET # BLD AUTO: 233 10E9/L (ref 150–450)
POTASSIUM SERPL-SCNC: 4.8 MMOL/L (ref 3.4–5.3)
RBC # BLD AUTO: 4.45 10E12/L (ref 3.8–5.2)
SODIUM SERPL-SCNC: 139 MMOL/L (ref 133–144)
WBC # BLD AUTO: 4.6 10E9/L (ref 4–11)

## 2020-09-01 PROCEDURE — 99214 OFFICE O/P EST MOD 30 MIN: CPT | Performed by: FAMILY MEDICINE

## 2020-09-01 PROCEDURE — 80048 BASIC METABOLIC PNL TOTAL CA: CPT | Performed by: FAMILY MEDICINE

## 2020-09-01 PROCEDURE — 85027 COMPLETE CBC AUTOMATED: CPT | Performed by: FAMILY MEDICINE

## 2020-09-01 PROCEDURE — 36415 COLL VENOUS BLD VENIPUNCTURE: CPT | Performed by: FAMILY MEDICINE

## 2020-09-01 NOTE — PROGRESS NOTES
[facility]  [address]  [place] [address]  [phone]  Dept: [phone]    PRE-OP EVALUATION:  Today's date: 2020    Arcelia Liz (: 1970) presents for pre-operative evaluation assessment as requested by Dr. Figueroa.  She requires evaluation and anesthesia risk assessment prior to undergoing surgery/procedure for treatment of gallbladder sludge .    Proposed Surgery/ Procedure: LAPAROSCOPIC CHOLECYSTECTOMY   Date of Surgery/ Procedure: 20  Time of Surgery/ Procedure: 0740  Hospital/Surgical Facility: [facility]  Surgery Fax Number: Note does not need to be faxed, will be available electronically in Epic.  Primary Physician: Simona Aviles  Type of Anesthesia Anticipated: General    Preoperative Questionnaire:   No - Have you ever had a heart attack or stroke?  No - Have you ever had surgery on your heart or blood vessels, such as a stent, coronary (heart) bypass, or surgery on an artery in the head, neck, heart, or legs?  No - Do you have chest pain when you are physically active?  No - Do you have a history of heart failure?  No - Do you currently have a cold, bronchitis, or symptoms of other respiratory (head and chest) infections?  No - Do you have a cough, shortness of breath, or wheezing?  No - Do you or anyone in your family have a history of blood clots?  No - Do you or anyone in your family have a serious bleeding problem, such as long-lasting bleeding after surgeries or cuts?  No - Have you ever had anemia or been told to take iron pills?  YES - HAVE YOU EVERY HAD ANEMIA OR BEEN TOLD TO TAKE IRON PILLS? Not currently taking  No - Have you ever had a blood transfusion?  Yes - Are you willing to have a blood transfusion if it is medically needed before, during, or after your surgery?  No - Have you or anyone in your family ever had problems with anesthesia (sedation for surgery)?  No - Do you have sleep apnea, excessive snoring, or daytime drowsiness?    No - Do you have any artifical heart valves or other implanted medical devices, such as a pacemaker, defibrillator, or continuous glucose monitor?  No - Do you have any artifical joints?  No - Are you allergic to latex?  No - Is there any chance that you may be pregnant?    Patient has a Health Care Directive or Living Will:  NO    HPI:     HPI related to upcoming procedure:     50 year old female here for a Pre-op exam.   She has had persistent intermittent RUQ abdominal pain, imaging studies revealed gallbladder sludge.   She was seen and evaluated by General Surgery. Treatment options discussed and patient opted to proceed with a Laparoscopic Cholecystectomy.   States that she understands the risks and benefits of the procedure and wishes to proceed.     Otherwise healthy and has no known chronic medical problems.     See problem list for active medical problems.  Problems all longstanding and stable, except as noted/documented.  See ROS for pertinent symptoms related to these conditions.      MEDICAL HISTORY:     Patient Active Problem List    Diagnosis Date Noted     Gallbladder sludge 08/07/2020     Priority: Medium     Added automatically from request for surgery [id number]       Inflammatory acne 08/11/2015     Priority: Medium     Comedonal acne 08/11/2015     Priority: Medium     Anemia 08/11/2015     Priority: Medium     Mantoux: positive 12/03/2013     Priority: Medium     Treated for 9 months 2010       Epigastric pain 03/13/2012     Priority: Medium     Normal ultrasound and no response to proton pump inhibitor.  Mild fatty liver.  HIDA scan normal. Follow-up with EGD.       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 1990s, completed 9 mo tx inh 11/2010     Past Surgical History:   Procedure Laterality Date     COLONOSCOPY  9/6/2012    Procedure: COLONOSCOPY;  COLONOSCOPY, CHRONIC LOWER ABD PAIN;  Surgeon: Jordan Mitchell MD;  Location: [facility]     DILATION AND CURETTAGE  2005    MAB, twin gestation     Current Outpatient Medications   Medication Sig Dispense Refill     clotrimazole (LOTRIMIN) 1 % external cream Apply topically 2 times daily 30 g 3     OTC products: None, except as noted above    Allergies   Allergen Reactions     Nkda [No Known Drug Allergies]       Latex Allergy: NO    Social History     Tobacco Use     Smoking status: Never Smoker     Smokeless tobacco: Never Used     Tobacco comment: Lives in smoke free household   Substance Use Topics     Alcohol use: No     History   Drug Use No       REVIEW OF SYSTEMS:   Constitutional, neuro, ENT, endocrine, pulmonary, cardiac, gastrointestinal, genitourinary, musculoskeletal, integument and psychiatric systems are negative, except as otherwise noted.    EXAM:   /84   Pulse 86   Temp 97.3  F (36.3  C) (Tympanic)   Resp 22   Wt 83.6 kg (184 lb 6.4 oz)   SpO2 98%   Breastfeeding No   BMI 31.65 kg/m      GENERAL APPEARANCE: healthy, alert and no distress     EYES: EOMI, PERRL     HENT: ear canals and TM's normal and nose and mouth without ulcers or lesions     NECK: no adenopathy, no asymmetry, masses, or scars and thyroid normal to palpation     RESP: lungs clear to auscultation - no rales, rhonchi or wheezes     CV: regular rates and rhythm, normal S1 S2, no S3 or S4 and no murmur, click or rub     ABDOMEN:  soft, nontender, no HSM or masses and bowel sounds normal     MS: extremities normal- no gross deformities noted, no evidence of inflammation in joints, FROM in all extremities.     SKIN: no suspicious lesions or rashes     NEURO: Normal strength and tone, sensory exam grossly normal, mentation intact and speech normal     PSYCH: mentation  appears normal. and affect normal/bright     LYMPHATICS: No cervical adenopathy    DIAGNOSTICS:     Labs Drawn and in Process:   Unresulted Labs Ordered in the Past 30 Days of this Admission     Date and Time Order Name Status Description    9/1/2020 0903 BASIC METABOLIC PANEL In process           Recent Labs   Lab Test 06/29/20  1510 04/07/20  1434  08/09/19  0920   HGB 12.6 12.6   < > 12.6    254   < > 199   NA  --  140  --  142   POTASSIUM  --  3.8  --  4.3   CR  --  0.53  --  0.63    < > = values in this interval not displayed.        IMPRESSION:   Reason for surgery/procedure: Laparoscopic Cholecystectomy.  Diagnosis/reason for consult: Gallbladder sludge    The proposed surgical procedure is considered INTERMEDIATE risk.    REVISED CARDIAC RISK INDEX  The patient has the following serious cardiovascular risks for perioperative complications such as (MI, PE, VFib and 3  AV Block):  No serious cardiac risks  INTERPRETATION: 0 risks: Class I (very low risk - 0.4% complication rate)    The patient has the following additional risks for perioperative complications:  No identified additional risks      ICD-10-CM    1. Preop general physical exam  Z01.818 CBC with platelets     Basic metabolic panel   2. Gallbladder sludge  K82.8        RECOMMENDATIONS:       Cardiovascular Risk  Performs 4 METs exercise without symptoms (Light housework (dusting, washing dishes) and Climb a flight of stairs) .       Pulmonary Risk  None identified.       --Patient is on no chronic medications    APPROVAL GIVEN to proceed with proposed procedure, without further diagnostic evaluation       Signed Electronically by: Simona Aviles MD    Copy of this evaluation report is provided to requesting physician.    [facility] Preop Guidelines    Revised Cardiac Risk Index

## 2020-09-01 NOTE — PATIENT INSTRUCTIONS

## 2020-09-04 ENCOUNTER — ANESTHESIA EVENT (OUTPATIENT)
Dept: SURGERY | Facility: AMBULATORY SURGERY CENTER | Age: 50
End: 2020-09-04

## 2020-09-04 DIAGNOSIS — Z11.59 ENCOUNTER FOR SCREENING FOR OTHER VIRAL DISEASES: ICD-10-CM

## 2020-09-04 PROCEDURE — U0003 INFECTIOUS AGENT DETECTION BY NUCLEIC ACID (DNA OR RNA); SEVERE ACUTE RESPIRATORY SYNDROME CORONAVIRUS 2 (SARS-COV-2) (CORONAVIRUS DISEASE [COVID-19]), AMPLIFIED PROBE TECHNIQUE, MAKING USE OF HIGH THROUGHPUT TECHNOLOGIES AS DESCRIBED BY CMS-2020-01-R: HCPCS | Performed by: SURGERY

## 2020-09-05 LAB
SARS-COV-2 RNA SPEC QL NAA+PROBE: NOT DETECTED
SPECIMEN SOURCE: NORMAL

## 2020-09-08 ENCOUNTER — ANESTHESIA (OUTPATIENT)
Dept: SURGERY | Facility: AMBULATORY SURGERY CENTER | Age: 50
End: 2020-09-08
Payer: COMMERCIAL

## 2020-09-08 ENCOUNTER — HOSPITAL ENCOUNTER (OUTPATIENT)
Facility: AMBULATORY SURGERY CENTER | Age: 50
Discharge: HOME | End: 2020-09-08
Attending: SURGERY | Admitting: SURGERY
Payer: COMMERCIAL

## 2020-09-08 VITALS
OXYGEN SATURATION: 98 % | SYSTOLIC BLOOD PRESSURE: 130 MMHG | RESPIRATION RATE: 14 BRPM | HEART RATE: 57 BPM | DIASTOLIC BLOOD PRESSURE: 91 MMHG | TEMPERATURE: 97.3 F

## 2020-09-08 DIAGNOSIS — K82.8 GALLBLADDER SLUDGE: ICD-10-CM

## 2020-09-08 LAB — HCG UR QL: NEGATIVE

## 2020-09-08 PROCEDURE — 81025 URINE PREGNANCY TEST: CPT | Performed by: ANESTHESIOLOGY

## 2020-09-08 PROCEDURE — G8907 PT DOC NO EVENTS ON DISCHARG: HCPCS

## 2020-09-08 PROCEDURE — 47562 LAPAROSCOPIC CHOLECYSTECTOMY: CPT

## 2020-09-08 PROCEDURE — G8916 PT W IV AB GIVEN ON TIME: HCPCS

## 2020-09-08 PROCEDURE — 88304 TISSUE EXAM BY PATHOLOGIST: CPT | Performed by: PATHOLOGY

## 2020-09-08 PROCEDURE — G8918 PT W/O PREOP ORDER IV AB PRO: HCPCS

## 2020-09-08 PROCEDURE — 47562 LAPAROSCOPIC CHOLECYSTECTOMY: CPT | Performed by: SURGERY

## 2020-09-08 RX ORDER — PROPOFOL 10 MG/ML
INJECTION, EMULSION INTRAVENOUS PRN
Status: DISCONTINUED | OUTPATIENT
Start: 2020-09-08 | End: 2020-09-08

## 2020-09-08 RX ORDER — KETOROLAC TROMETHAMINE 30 MG/ML
INJECTION, SOLUTION INTRAMUSCULAR; INTRAVENOUS PRN
Status: DISCONTINUED | OUTPATIENT
Start: 2020-09-08 | End: 2020-09-08

## 2020-09-08 RX ORDER — ONDANSETRON 4 MG/1
4-8 TABLET, ORALLY DISINTEGRATING ORAL EVERY 8 HOURS PRN
Qty: 4 TABLET | Refills: 0 | Status: SHIPPED | OUTPATIENT
Start: 2020-09-08 | End: 2021-04-26

## 2020-09-08 RX ORDER — CEFAZOLIN SODIUM 2 G/100ML
2 INJECTION, SOLUTION INTRAVENOUS
Status: COMPLETED | OUTPATIENT
Start: 2020-09-08 | End: 2020-09-08

## 2020-09-08 RX ORDER — ONDANSETRON 2 MG/ML
4 INJECTION INTRAMUSCULAR; INTRAVENOUS EVERY 30 MIN PRN
Status: DISCONTINUED | OUTPATIENT
Start: 2020-09-08 | End: 2020-09-09 | Stop reason: HOSPADM

## 2020-09-08 RX ORDER — ONDANSETRON 4 MG/1
4 TABLET, ORALLY DISINTEGRATING ORAL EVERY 30 MIN PRN
Status: DISCONTINUED | OUTPATIENT
Start: 2020-09-08 | End: 2020-09-09 | Stop reason: HOSPADM

## 2020-09-08 RX ORDER — FENTANYL CITRATE 50 UG/ML
25-50 INJECTION, SOLUTION INTRAMUSCULAR; INTRAVENOUS
Status: DISCONTINUED | OUTPATIENT
Start: 2020-09-08 | End: 2020-09-09 | Stop reason: HOSPADM

## 2020-09-08 RX ORDER — LIDOCAINE 40 MG/G
CREAM TOPICAL
Status: DISCONTINUED | OUTPATIENT
Start: 2020-09-08 | End: 2020-09-09 | Stop reason: HOSPADM

## 2020-09-08 RX ORDER — OXYCODONE HYDROCHLORIDE 5 MG/1
5 TABLET ORAL EVERY 4 HOURS PRN
Status: DISCONTINUED | OUTPATIENT
Start: 2020-09-08 | End: 2020-09-09 | Stop reason: HOSPADM

## 2020-09-08 RX ORDER — NALOXONE HYDROCHLORIDE 0.4 MG/ML
.1-.4 INJECTION, SOLUTION INTRAMUSCULAR; INTRAVENOUS; SUBCUTANEOUS
Status: DISCONTINUED | OUTPATIENT
Start: 2020-09-08 | End: 2020-09-09 | Stop reason: HOSPADM

## 2020-09-08 RX ORDER — ONDANSETRON 2 MG/ML
INJECTION INTRAMUSCULAR; INTRAVENOUS PRN
Status: DISCONTINUED | OUTPATIENT
Start: 2020-09-08 | End: 2020-09-08

## 2020-09-08 RX ORDER — ACETAMINOPHEN 325 MG/1
650 TABLET ORAL EVERY 6 HOURS PRN
Qty: 50 TABLET | Refills: 0 | COMMUNITY
Start: 2020-09-08

## 2020-09-08 RX ORDER — OXYCODONE HYDROCHLORIDE 5 MG/1
5-10 TABLET ORAL EVERY 4 HOURS PRN
Qty: 12 TABLET | Refills: 0 | Status: SHIPPED | OUTPATIENT
Start: 2020-09-08 | End: 2021-04-26

## 2020-09-08 RX ORDER — BUPIVACAINE HYDROCHLORIDE AND EPINEPHRINE 2.5; 5 MG/ML; UG/ML
INJECTION, SOLUTION INFILTRATION; PERINEURAL PRN
Status: DISCONTINUED | OUTPATIENT
Start: 2020-09-08 | End: 2020-09-08 | Stop reason: HOSPADM

## 2020-09-08 RX ORDER — FENTANYL CITRATE 50 UG/ML
INJECTION, SOLUTION INTRAMUSCULAR; INTRAVENOUS PRN
Status: DISCONTINUED | OUTPATIENT
Start: 2020-09-08 | End: 2020-09-08

## 2020-09-08 RX ORDER — ACETAMINOPHEN 325 MG/1
975 TABLET ORAL ONCE
Status: COMPLETED | OUTPATIENT
Start: 2020-09-08 | End: 2020-09-08

## 2020-09-08 RX ORDER — CEFAZOLIN SODIUM 1 G/3ML
1 INJECTION, POWDER, FOR SOLUTION INTRAMUSCULAR; INTRAVENOUS SEE ADMIN INSTRUCTIONS
Status: DISCONTINUED | OUTPATIENT
Start: 2020-09-08 | End: 2020-09-09 | Stop reason: HOSPADM

## 2020-09-08 RX ORDER — DEXAMETHASONE SODIUM PHOSPHATE 4 MG/ML
INJECTION, SOLUTION INTRA-ARTICULAR; INTRALESIONAL; INTRAMUSCULAR; INTRAVENOUS; SOFT TISSUE PRN
Status: DISCONTINUED | OUTPATIENT
Start: 2020-09-08 | End: 2020-09-08

## 2020-09-08 RX ORDER — AMOXICILLIN 250 MG
1-2 CAPSULE ORAL 2 TIMES DAILY
Qty: 30 TABLET | Refills: 0 | Status: SHIPPED | OUTPATIENT
Start: 2020-09-08 | End: 2021-04-26

## 2020-09-08 RX ORDER — SODIUM CHLORIDE, SODIUM LACTATE, POTASSIUM CHLORIDE, CALCIUM CHLORIDE 600; 310; 30; 20 MG/100ML; MG/100ML; MG/100ML; MG/100ML
INJECTION, SOLUTION INTRAVENOUS CONTINUOUS
Status: DISCONTINUED | OUTPATIENT
Start: 2020-09-08 | End: 2020-09-09 | Stop reason: HOSPADM

## 2020-09-08 RX ORDER — GABAPENTIN 300 MG/1
300 CAPSULE ORAL ONCE
Status: COMPLETED | OUTPATIENT
Start: 2020-09-08 | End: 2020-09-08

## 2020-09-08 RX ORDER — LIDOCAINE HYDROCHLORIDE 20 MG/ML
INJECTION, SOLUTION INFILTRATION; PERINEURAL PRN
Status: DISCONTINUED | OUTPATIENT
Start: 2020-09-08 | End: 2020-09-08

## 2020-09-08 RX ADMIN — ONDANSETRON 4 MG: 2 INJECTION INTRAMUSCULAR; INTRAVENOUS at 09:26

## 2020-09-08 RX ADMIN — FENTANYL CITRATE 25 MCG: 50 INJECTION, SOLUTION INTRAMUSCULAR; INTRAVENOUS at 08:43

## 2020-09-08 RX ADMIN — SODIUM CHLORIDE, SODIUM LACTATE, POTASSIUM CHLORIDE, CALCIUM CHLORIDE: 600; 310; 30; 20 INJECTION, SOLUTION INTRAVENOUS at 07:26

## 2020-09-08 RX ADMIN — LIDOCAINE HYDROCHLORIDE 60 ML: 20 INJECTION, SOLUTION INFILTRATION; PERINEURAL at 07:34

## 2020-09-08 RX ADMIN — FENTANYL CITRATE 25 MCG: 50 INJECTION, SOLUTION INTRAMUSCULAR; INTRAVENOUS at 09:29

## 2020-09-08 RX ADMIN — CEFAZOLIN SODIUM 2 G: 2 INJECTION, SOLUTION INTRAVENOUS at 07:39

## 2020-09-08 RX ADMIN — PROPOFOL 90 MG: 10 INJECTION, EMULSION INTRAVENOUS at 07:34

## 2020-09-08 RX ADMIN — KETOROLAC TROMETHAMINE 30 MG: 30 INJECTION, SOLUTION INTRAMUSCULAR; INTRAVENOUS at 08:25

## 2020-09-08 RX ADMIN — ONDANSETRON 4 MG: 2 INJECTION INTRAMUSCULAR; INTRAVENOUS at 08:24

## 2020-09-08 RX ADMIN — SODIUM CHLORIDE, SODIUM LACTATE, POTASSIUM CHLORIDE, CALCIUM CHLORIDE: 600; 310; 30; 20 INJECTION, SOLUTION INTRAVENOUS at 07:13

## 2020-09-08 RX ADMIN — FENTANYL CITRATE 100 MCG: 50 INJECTION, SOLUTION INTRAMUSCULAR; INTRAVENOUS at 07:34

## 2020-09-08 RX ADMIN — ACETAMINOPHEN 975 MG: 325 TABLET ORAL at 06:45

## 2020-09-08 RX ADMIN — GABAPENTIN 300 MG: 300 CAPSULE ORAL at 06:46

## 2020-09-08 RX ADMIN — DEXAMETHASONE SODIUM PHOSPHATE 4 MG: 4 INJECTION, SOLUTION INTRA-ARTICULAR; INTRALESIONAL; INTRAMUSCULAR; INTRAVENOUS; SOFT TISSUE at 07:53

## 2020-09-08 RX ADMIN — OXYCODONE HYDROCHLORIDE 5 MG: 5 TABLET ORAL at 09:22

## 2020-09-08 NOTE — OP NOTE
General Surgery Operative Note    Pre-operative diagnosis: symptomatic gallbladder sludge   Post-operative diagnosis: same   Procedure: laparoscopic cholecystectomy   Surgeon: Javier Figueroa DO   Assistant(s): NONE   Anesthesia: general   Estimated blood loss:  Specimen: 5 cc  gallbladder and contents               DESCRIPTION OF PROCEDURE:  The patient was taken to the operating room and placed on the table in supine position.  General endotracheal anesthesia was induced and the abdomen was prepped and draped in standard sterile fashion.  An incision above the umbilicus was made with an 11 blade.  A 5mm visiport was placed under direct visualization in the usual manner and the abdomen was insufflated with CO2.  A 5 mm trocar was placed in the subxiphoid position.  A 5 mm trocar was placed in the right upper quadrant, just below the costal margin at the midclavicular line.  Another 5mm port was placed at the anterior axillary line just below the costal margin on the right.  The patient was placed in reverse Trendelenburg and right side up.  The gallbladder appeared edematous.  The fundus of the gallbladder was grasped and retracted cephalad.  The infundibulum was grasped and retracted laterally.  The peritoneum over the medial and lateral aspects of the triangle of Calot was taken down with the [place] dissector and modest amounts of Bovie electrocautery.  The cystic duct and artery were freed up from surrounding tissues.  The triangle of Calot was skeletonized revealing the critical view of safety.  This revealed no additional ducts or vessels.  The cystic artery and duct were each clipped twice proximally, once distally and transected with the hook scissors.  The gallbladder was then removed from the liver using the hook electrocautery.  The gallbladder was passed into an Endocatch bag and removed through the epigastric trocar site.  We observed the right upper quadrant carefully for hemostasis.  Hemostasis  was assured.  We irrigated with copious amounts of sterile saline and aspirated the effluent.  The right upper quadrant trocar sites were anesthetized with local anesthetic.  Each of the trocars was removed under direct visualization and the  abdomen was evacuated of CO2. There was no bleeding from any of these sites.  All of the incisions were closed with interrupted 3-0 Vicryl deep subcuticular sutures. Dermabond and small imbedded Steri-Strips were used to approximate the epidermis.  The patient tolerated the procedure well.  Sponge and instrument counts were correct.    Javier Figueroa, DO

## 2020-09-08 NOTE — ANESTHESIA PREPROCEDURE EVALUATION
"Anesthesia Pre-Procedure Evaluation    Patient: Arcelia Liz   MRN:     [id number] Gender:   female   Age:    50 year old :      1970        Preoperative Diagnosis: Gallbladder sludge [K82.8]   Procedure(s):  LAPAROSCOPIC CHOLECYSTECTOMY     LABS:  CBC:   Lab Results   Component Value Date    WBC 4.6 2020    WBC 3.6 (L) 2020    HGB 12.5 2020    HGB 12.6 2020    HCT 38.1 2020    HCT 39.1 2020     2020     2020     BMP:   Lab Results   Component Value Date     2020     2020    POTASSIUM 4.8 2020    POTASSIUM 3.8 2020    CHLORIDE 110 (H) 2020    CHLORIDE 105 2020    CO2 25 2020    CO2 30 2020    BUN 12 2020    BUN 11 2020    CR 0.62 2020    CR 0.53 2020     (H) 2020    GLC 98 2020     COAGS: No results found for: PTT, INR, FIBR  POC:   Lab Results   Component Value Date    HCG Negative 2020     OTHER:   Lab Results   Component Value Date    CELSO 8.8 2020    ALBUMIN 3.8 2020    PROTTOTAL 7.7 2020    ALT 22 2020    AST 16 2020    ALKPHOS 101 2020    BILITOTAL 0.4 2020    LIPASE 45 (L) 2020    AMYLASE 45 2020    TSH 2.78 2015    CRP <5.0 2014    SED 10 2014        Preop Vitals    BP Readings from Last 3 Encounters:   20 120/76   20 122/84   20 126/87    Pulse Readings from Last 3 Encounters:   20 86   20 89   20 83      Resp Readings from Last 3 Encounters:   20 18   20 22   20 16    SpO2 Readings from Last 3 Encounters:   20 100%   20 98%   20 97%      Temp Readings from Last 1 Encounters:   20 96.3  F (35.7  C) (Temporal)    Ht Readings from Last 1 Encounters:   20 1.626 m (5' 4\")      Wt Readings from Last 1 Encounters:   20 83.6 kg (184 lb 6.4 oz)    Estimated body mass index is 31.65 " "kg/m  as calculated from the following:    Height as of 7/2/20: 1.626 m (5' 4\").    Weight as of 9/1/20: 83.6 kg (184 lb 6.4 oz).     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 1990s, completed 9 mo tx inh 11/2010      Past Surgical History:   Procedure Laterality Date     COLONOSCOPY  9/6/2012    Procedure: COLONOSCOPY;  COLONOSCOPY, CHRONIC LOWER ABD PAIN;  Surgeon: Jordan Mitchell MD;  Location: [facility]     DILATION AND CURETTAGE  2005    MAB, twin gestation      Allergies   Allergen Reactions     Nkda [No Known Drug Allergies]         Anesthesia Evaluation     .             ROS/MED HX    ENT/Pulmonary:  - neg pulmonary ROS     Neurologic:       Cardiovascular:  - neg cardiovascular ROS       METS/Exercise Tolerance:     Hematologic:         Musculoskeletal:         GI/Hepatic:  - neg GI/hepatic ROS       Renal/Genitourinary:  - ROS Renal section negative       Endo:         Psychiatric:         Infectious Disease:         Malignancy:         Other:                         PHYSICAL EXAM:   Mental Status/Neuro: A/A/O   Airway: Facies: Feasible  Mallampati: II   Respiratory: Auscultation: CTAB      CV: Rhythm: Regular   Comments:                      Assessment:   ASA SCORE: 1    H&P: History and physical reviewed and following examination; no interval change.   Smoking Status:  Non-Smoker/Unknown   NPO Status: NPO Appropriate     Plan:   Anes. Type:  General   Pre-Medication: None   Induction:  IV (Standard)   Airway: ETT; Oral   Access/Monitoring: PIV   Maintenance: TIVA     Postop Plan:   Postop Pain: Opioids  Postop Sedation/Airway: Not planned     PONV Management:   Adult Risk Factors: Female, Non-Smoker, Postop Opioids   Prevention: Ondansetron, " Dexamethasone, No Volatiles     CONSENT: Direct conversation   Plan and risks discussed with: Patient                      Richardson Fung MD

## 2020-09-08 NOTE — ANESTHESIA CARE TRANSFER NOTE
Patient: Arcelia Liz    Procedure(s):  LAPAROSCOPIC CHOLECYSTECTOMY    Diagnosis: Gallbladder sludge [K82.8]  Diagnosis Additional Information: No value filed.    Anesthesia Type:   General     Note:  Airway :Face Mask and Oral Airway  Patient transferred to:PACU  Handoff Report: Identifed the Patient, Identified the Reponsible Provider, Reviewed the pertinent medical history, Discussed the surgical course, Reviewed Intra-OP anesthesia mangement and issues during anesthesia, Set expectations for post-procedure period and Allowed opportunity for questions and acknowledgement of understanding      Vitals: (Last set prior to Anesthesia Care Transfer)    CRNA VITALS  9/8/2020 0821 - 9/8/2020 0902      9/8/2020             Resp Rate (observed):  (!) 7                Electronically Signed By: NAKUL Helton CRNA  September 8, 2020  9:02 AM

## 2020-09-08 NOTE — DISCHARGE INSTRUCTIONS
[facility]   Adult Discharge Orders & Instructions     For 24 hours after surgery    1. Get plenty of rest.  A responsible adult must stay with you for at least 24 hours after you leave the hospital.   2. Do not drive or use heavy equipment.  If you have weakness or tingling, don't drive or use heavy equipment until this feeling goes away.  3. Do not drink alcohol.  4. Avoid strenuous or risky activities.  Ask for help when climbing stairs.   5. You may feel lightheaded.  IF so, sit for a few minutes before standing.  Have someone help you get up.   6. If you have nausea (feel sick to your stomach): Drink only clear liquids such as apple juice, ginger ale, broth or 7-Up.  Rest may also help.  Be sure to drink enough fluids.  Move to a regular diet as you feel able.  7. You may have a slight fever. Call the doctor if your fever is over 100 F (37.7 C) (taken under the tongue) or lasts longer than 24 hours.  8. You may have a dry mouth, a sore throat, muscle aches or trouble sleeping.  These should go away after 24 hours.  9. Do not make important or legal decisions.     Call your doctor for any of the followin.  Signs of infection (fever, growing tenderness at the surgery site, a large amount of drainage or bleeding, severe pain, foul-smelling drainage, redness, swelling).    2. It has been over 8 to 10 hours since surgery and you are still not able to urinate (pass water).    3.  Headache for over 24 hours.    4.  Numbness, tingling or weakness the day after surgery (if you had spinal anesthesia).                  5. Signs of Covid-19 infection (temperature over 100 degrees, shortness of breath, cough, loss of taste/smell, generalized body aches, persistent headache,                  chills, sore throat, nausea/vomiting/diarrhea).      To contact Dr Figueroa call [phone].    Tylenol was given at 6:45 am.  No Ibuprofen before 2:25 pm.

## 2020-09-08 NOTE — ANESTHESIA POSTPROCEDURE EVALUATION
Anesthesia POST Procedure Evaluation    Patient: Arcelia Liz   MRN:     [id number] Gender:   female   Age:    50 year old :      1970        Preoperative Diagnosis: Gallbladder sludge [K82.8]   Procedure(s):  LAPAROSCOPIC CHOLECYSTECTOMY   Postop Comments: No value filed.     Anesthesia Type: General       Disposition: Outpatient   Postop Pain Control: Uneventful            Sign Out: Well controlled pain   PONV: No   Neuro/Psych: Uneventful            Sign Out: Acceptable/Baseline neuro status   Airway/Respiratory: Uneventful            Sign Out: Acceptable/Baseline resp. status   CV/Hemodynamics: Uneventful            Sign Out: Acceptable CV status   Other NRE: NONE   DID A NON-ROUTINE EVENT OCCUR? No         Last Anesthesia Record Vitals:  CRNA VITALS  2020 0821 - 2020 0921      2020             Resp Rate (observed):  (!) 7          Last PACU Vitals:  Vitals Value Taken Time   /91 2020 10:00 AM   Temp     Pulse 57 2020 10:00 AM   Resp 14 2020 10:00 AM   SpO2 98 % 2020 10:00 AM   Temp src     NIBP     Pulse     SpO2     Resp     Temp     Ht Rate     Temp 2           Electronically Signed By: Richardson Fung MD, 2020, 12:07 PM

## 2020-09-11 LAB — COPATH REPORT: NORMAL

## 2020-09-24 ENCOUNTER — OFFICE VISIT (OUTPATIENT)
Dept: SURGERY | Facility: CLINIC | Age: 50
End: 2020-09-24
Payer: COMMERCIAL

## 2020-09-24 VITALS
RESPIRATION RATE: 14 BRPM | DIASTOLIC BLOOD PRESSURE: 86 MMHG | SYSTOLIC BLOOD PRESSURE: 119 MMHG | WEIGHT: 182 LBS | HEART RATE: 84 BPM | BODY MASS INDEX: 31.24 KG/M2

## 2020-09-24 DIAGNOSIS — K82.8 GALLBLADDER SLUDGE: Primary | ICD-10-CM

## 2020-09-24 PROCEDURE — 99024 POSTOP FOLLOW-UP VISIT: CPT | Performed by: SURGERY

## 2020-09-24 NOTE — LETTER
9/24/2020         RE: Arcelia Liz  [address] [facility] [address]        Dear Colleague,    Thank you for referring your patient, Arcelia Liz, to the [facility]. Please see a copy of my visit note below.    General Surgery Post Op    Pt returns for follow up visit s/p laparoscopic cholecystectomy on 9/8/20.    Patient has been doing well, tolerating diet. Bowels moving well. Pain controlled. No issues with wound healing/redness/drainage. No fevers.    Physical exam: Vitals: /86 (BP Location: Right arm, Patient Position: Sitting, Cuff Size: Adult Regular)   Pulse 84   Resp 14   Wt 82.6 kg (182 lb)   LMP 09/07/2020   BMI 31.24 kg/m    BMI= Body mass index is 31.24 kg/m .    Exam:  Constitutional: healthy, alert and no distress  Respiratory: Non-labored  Gastrointestinal: Abdomen soft, non-tender. BS normal. No masses, organomegaly  Incisions are healing well with no erythema or exudate    Path:  Chronic cholecystitis with adenomyomatosis     Assessment: Pt is doing well s/p lap suze  - We discussed the pathology results and all questions were answered to the best of my ability.     Plan: Pt doing well and can follow up as needed.       Javier Figueroa DO        Again, thank you for allowing me to participate in the care of your patient.        Sincerely,        Javier Figueroa, DO

## 2020-09-24 NOTE — NURSING NOTE
"Chief Complaint   Patient presents with     Post-Op - General Surgery     Lap Liz DOS 9/8/20       Initial /86 (BP Location: Right arm, Patient Position: Sitting, Cuff Size: Adult Regular)   Pulse 84   Resp 14   Wt 82.6 kg (182 lb)   LMP 09/07/2020   BMI 31.24 kg/m   Estimated body mass index is 31.24 kg/m  as calculated from the following:    Height as of 7/2/20: 1.626 m (5' 4\").    Weight as of this encounter: 82.6 kg (182 lb).  BP completed using cuff size: regular  Medications and allergies reviewed.      Nazia BROWN MA    "

## 2020-09-30 NOTE — PROGRESS NOTES
General Surgery Post Op    Pt returns for follow up visit s/p laparoscopic cholecystectomy on 9/8/20.    Patient has been doing well, tolerating diet. Bowels moving well. Pain controlled. No issues with wound healing/redness/drainage. No fevers.    Physical exam: Vitals: /86 (BP Location: Right arm, Patient Position: Sitting, Cuff Size: Adult Regular)   Pulse 84   Resp 14   Wt 82.6 kg (182 lb)   LMP 09/07/2020   BMI 31.24 kg/m    BMI= Body mass index is 31.24 kg/m .    Exam:  Constitutional: healthy, alert and no distress  Respiratory: Non-labored  Gastrointestinal: Abdomen soft, non-tender. BS normal. No masses, organomegaly  Incisions are healing well with no erythema or exudate    Path:  Chronic cholecystitis with adenomyomatosis     Assessment: Pt is doing well s/p lap suze  - We discussed the pathology results and all questions were answered to the best of my ability.     Plan: Pt doing well and can follow up as needed.       Javier Figueroa, DO

## 2020-10-26 ENCOUNTER — OFFICE VISIT (OUTPATIENT)
Dept: FAMILY MEDICINE | Facility: CLINIC | Age: 50
End: 2020-10-26
Payer: COMMERCIAL

## 2020-10-26 ENCOUNTER — ANCILLARY PROCEDURE (OUTPATIENT)
Dept: GENERAL RADIOLOGY | Facility: CLINIC | Age: 50
End: 2020-10-26
Attending: PHYSICIAN ASSISTANT
Payer: COMMERCIAL

## 2020-10-26 VITALS
RESPIRATION RATE: 16 BRPM | SYSTOLIC BLOOD PRESSURE: 132 MMHG | OXYGEN SATURATION: 98 % | DIASTOLIC BLOOD PRESSURE: 83 MMHG | WEIGHT: 184.8 LBS | BODY MASS INDEX: 31.72 KG/M2 | TEMPERATURE: 98 F | HEART RATE: 90 BPM

## 2020-10-26 DIAGNOSIS — R07.1 CHEST PAIN ON BREATHING: ICD-10-CM

## 2020-10-26 DIAGNOSIS — Z23 ENCOUNTER FOR IMMUNIZATION: ICD-10-CM

## 2020-10-26 DIAGNOSIS — R07.1 CHEST PAIN ON BREATHING: Primary | ICD-10-CM

## 2020-10-26 LAB
ALBUMIN SERPL-MCNC: 3.7 G/DL (ref 3.4–5)
ALP SERPL-CCNC: 122 U/L (ref 40–150)
ALT SERPL W P-5'-P-CCNC: 29 U/L (ref 0–50)
ANION GAP SERPL CALCULATED.3IONS-SCNC: 6 MMOL/L (ref 3–14)
AST SERPL W P-5'-P-CCNC: 18 U/L (ref 0–45)
BILIRUB SERPL-MCNC: 0.3 MG/DL (ref 0.2–1.3)
BUN SERPL-MCNC: 11 MG/DL (ref 7–30)
CALCIUM SERPL-MCNC: 9.2 MG/DL (ref 8.5–10.1)
CHLORIDE SERPL-SCNC: 107 MMOL/L (ref 94–109)
CO2 SERPL-SCNC: 26 MMOL/L (ref 20–32)
CREAT SERPL-MCNC: 0.54 MG/DL (ref 0.52–1.04)
D DIMER PPP FEU-MCNC: <0.3 UG/ML FEU (ref 0–0.5)
ERYTHROCYTE [DISTWIDTH] IN BLOOD BY AUTOMATED COUNT: 14.3 % (ref 10–15)
GFR SERPL CREATININE-BSD FRML MDRD: >90 ML/MIN/{1.73_M2}
GLUCOSE SERPL-MCNC: 84 MG/DL (ref 70–99)
HCT VFR BLD AUTO: 39.3 % (ref 35–47)
HGB BLD-MCNC: 12.8 G/DL (ref 11.7–15.7)
MCH RBC QN AUTO: 27.9 PG (ref 26.5–33)
MCHC RBC AUTO-ENTMCNC: 32.6 G/DL (ref 31.5–36.5)
MCV RBC AUTO: 86 FL (ref 78–100)
PLATELET # BLD AUTO: 272 10E9/L (ref 150–450)
POTASSIUM SERPL-SCNC: 4.2 MMOL/L (ref 3.4–5.3)
PROT SERPL-MCNC: 7.4 G/DL (ref 6.8–8.8)
RBC # BLD AUTO: 4.59 10E12/L (ref 3.8–5.2)
SODIUM SERPL-SCNC: 139 MMOL/L (ref 133–144)
TROPONIN I SERPL-MCNC: <0.015 UG/L (ref 0–0.04)
WBC # BLD AUTO: 4.5 10E9/L (ref 4–11)

## 2020-10-26 PROCEDURE — 85379 FIBRIN DEGRADATION QUANT: CPT | Performed by: PHYSICIAN ASSISTANT

## 2020-10-26 PROCEDURE — 80053 COMPREHEN METABOLIC PANEL: CPT | Performed by: PHYSICIAN ASSISTANT

## 2020-10-26 PROCEDURE — 36415 COLL VENOUS BLD VENIPUNCTURE: CPT | Performed by: PHYSICIAN ASSISTANT

## 2020-10-26 PROCEDURE — 84484 ASSAY OF TROPONIN QUANT: CPT | Performed by: PHYSICIAN ASSISTANT

## 2020-10-26 PROCEDURE — 99215 OFFICE O/P EST HI 40 MIN: CPT | Performed by: PHYSICIAN ASSISTANT

## 2020-10-26 PROCEDURE — 85027 COMPLETE CBC AUTOMATED: CPT | Performed by: PHYSICIAN ASSISTANT

## 2020-10-26 PROCEDURE — 71046 X-RAY EXAM CHEST 2 VIEWS: CPT | Mod: TC

## 2020-10-26 PROCEDURE — 93000 ELECTROCARDIOGRAM COMPLETE: CPT | Performed by: PHYSICIAN ASSISTANT

## 2020-10-26 NOTE — PATIENT INSTRUCTIONS
Rachel Paniagua,    Thank you for allowing [facility] to manage your care.    I am unsure of the cause of your symptoms, however your exam today was reassuring.  I will call you with any abnormal labs tonight.  Please go to the emergency department you develop worsening/changing symptoms anytime such as coughing up blood, fainting, fevers over 100  F, worsening pain, etc.    I ordered some blood work, please go to the laboratory to get your laboratory studies.    I ordered some xrays, please go to our radiology department to get your xrays.    I ordered a stress echocardiogram , please call diagnostic imaging [phone] to schedule your test.    Please allow 1-2 business days for our office to contact you in regards to your laboratory/radiological studies.  If not done so, I encourage you to login into [employer] ([email or web address] to review your results as well.     If you have any questions or concerns, please feel free to call us at [phone]    Sincerely,    Jimmie Tang PA-C    Did you know?      You can schedule a video visit for follow-up appointments as well as future appointments for certain conditions.  Please see the below link.     [email or web address]    If you have not already done so,  I encourage you to sign up for [employer] ([email or web address]).  This will allow you to review your results, securely communicate with a provider, and schedule virtual visits as well.      Patient Education     Uncertain Causes of Chest Pain    Chest pain can happen for a number of reasons. Sometimes the cause can't be determined. If your condition does not seem serious, and your pain does not appear to be coming from your heart, your healthcare provider may recommend watching it closely. Sometimes the signs of a serious problem take more time to appear. Many problems not related to your heart can cause chest pain. These  include:    Musculoskeletal. Costochondritis is an inflammation of the tissues around the ribs that can occur from trauma or overuse injuries, or a strain of the muscles of the chest wall    Respiratory. Pneumonia, collapsed lung (pneumothorax), or inflammation of the lining of the chest and lungs (pleurisy)    Gastrointestinal. Esophageal reflux, heartburn, ulcers, or gallbladder disease    Anxiety and panic disorders    Nerve compression and inflammation    Rare miscellaneous problems such as aortic aneurysm (a swelling of the large artery coming out of the heart) or pulmonary embolism (a blood clot in the lungs)  Home care  After your visit, follow these recommendations:    Rest today and avoid strenuous activity.    Take any prescribed medicine as directed.    Be aware of any recurrent chest pain and notice any changes  Follow-up care  Follow up with your healthcare provider if you do not start to feel better within 24 hours, or as advised.  Call 911  Call 911 if any of these occur:    A change in the type of pain: if it feels different, becomes more severe, lasts longer, or begins to spread into your shoulder, arm, neck, jaw or back    Shortness of breath or increased pain with breathing    Weakness, dizziness, or fainting    Rapid heart beat    Crushing sensation in your chest  When to seek medical advice  Call your healthcare provider right away if any of the following occur:    Cough with dark colored sputum (phlegm) or blood    Fever of 100.4 F (38 C) or higher, or as directed by your healthcare provider    Swelling, pain or redness in one leg  Date Last Reviewed: 5/1/2018 2000-2019 [employer]. [address] [place] [address]. All rights reserved. This information is not intended as a substitute for professional medical care. Always follow your healthcare professional's instructions.

## 2020-10-26 NOTE — PROGRESS NOTES
Subjective     Arcelia Liz is a 50 year old female who presents to clinic today for the following health issues:    HPI         Abdominal/Flank Pain  Onset/Duration: left upper chest pain 6 months ago that was worked up and negative in April. Went away and returned 1 week ago.   Description:   Character: Sharp  Location: upper chest and left arm  Radiation: arm  Intensity: moderate  Progression of Symptoms:  same  Accompanying Signs & Symptoms:  Fever/Chills: no  Gas/Bloating: no  Nausea: no  Vomitting: no  Diarrhea: no  Constipation: YES  Dysuria or Hematuria: no  History:   Trauma: no  Previous similar pain: YES  Previous tests done: MRI  Precipitating factors:   Does the pain change with:     Food: no    Bowel Movement: no    Urination: no   Other factors:  no  Therapies tried and outcome: took 325 mg of ASA today.   No LMP recorded.    Brother had coronary stent in 50s. Otherwise, no CAD risk factors. PERC negative aside from age. No tobacco, drug or EtOH use. Works for [employer]    Review of Systems   Constitutional, HEENT, cardiovascular, pulmonary, gi and gu systems are negative, except as otherwise noted.      Objective    /83   Pulse 90   Temp 98  F (36.7  C) (Tympanic)   Resp 16   Wt 83.8 kg (184 lb 12.8 oz)   SpO2 98%   BMI 31.72 kg/m    Body mass index is 31.72 kg/m .  Physical Exam  Vitals signs and nursing note reviewed.   Constitutional:       General: She is not in acute distress.     Appearance: She is not ill-appearing or diaphoretic.   HENT:      Head: Normocephalic and atraumatic.      Mouth/Throat:      Mouth: Mucous membranes are moist.   Eyes:      Conjunctiva/sclera: Conjunctivae normal.   Cardiovascular:      Rate and Rhythm: Normal rate and regular rhythm.      Heart sounds: Normal heart sounds. No murmur. No friction rub. No gallop.       Comments: 2+ symmetric radial and PT pulses.  No lower extremity edema or tenderness.  Pulmonary:      Effort: Pulmonary effort is normal. No  respiratory distress.      Breath sounds: Normal breath sounds. No stridor. No wheezing, rhonchi or rales.   Abdominal:      General: Bowel sounds are normal. There is no distension.      Palpations: Abdomen is soft. There is no mass.      Tenderness: There is no abdominal tenderness. There is no guarding or rebound.      Hernia: No hernia is present.   Musculoskeletal:      Comments: Left upper chest tenderness consistent with chest pain symptoms.   Skin:     General: Skin is warm and dry.   Neurological:      General: No focal deficit present.      Mental Status: She is alert. Mental status is at baseline.   Psychiatric:         Mood and Affect: Mood normal.         Behavior: Behavior normal.          EKG: NSR 84bpm. No significant change from 5/10/2019.  CXR, CBC, Basic 8, trop, and d dimer pending    Assessment & Plan   Problem List Items Addressed This Visit     None      Visit Diagnoses     Chest pain on breathing    -  Primary    Relevant Orders    D dimer, quantitative    Troponin I    EKG 12-lead complete w/read - Clinics (Completed)    XR Chest 2 Views    CBC with platelets    Comprehensive metabolic panel (BMP + Alb, Alk Phos, ALT, AST, Total. Bili, TP)    Echocardiogram Exercise Stress    Encounter for immunization             MEDICAL DECISION MAKING: I was concerned about this patient's chest pain and considered multiple causes including but not limited to the following.       ACS: EKG does not show acute ischemic changes and troponin is normal. Heart Score is low for MACE. Stress echo ordered. No previous cardiac stress test.    PE:  Wells Criteria is low risk.  D-dimer pending    Aortic Dissection: The onset of pain was neither sudden nor severe, no history of poorly controlled high blood pressure and radial / pedal pulses are symmetrical, There is no widening of the mediastinum on chest x-ray, and no marfanoid features are present    Cardiac Tamponade:  EKG shows no decreased voltage, heart sounds  are not diminished on exam.      PTX: No sign of pneumothorax on chest x-ray .     Esophageal Rupture: There was not preceding forceful vomiting or retching and the CXR does not show mediastinal free air.     Other causes considered: pericarditis, pleural effusion, pneumonia and referred pain from the ABD      IMPRESSION: Based on this patient's history, exam, and diagnostic results, the working diagnosis of this patient's symptoms is unclear but likely non-emergent given previous negative workup and reproducible tenderness today. Could be musculoskeletal source. Follow up with primary vs cardiology in 1-2 weeks. ER if worsening/changing. Discussed transfer to ER for eval and patient comfortable doing outpatient workup with the understanding that if her symptoms change, she is to call 911/go to the ER immediately.    Complete history and physical exam as above. AF with normal VS.    DDx and Dx discussed with and explained to the pt to their satisfaction.  All questions were answered at this time. Pt expressed understanding of and agreement with this dx, tx, and plan. No further workup warranted and standard medication warnings given. I have given the patient a list of pertinent indications for re-evaluation. Will go to the Emergency Department if symptoms worsen or new concerning symptoms arise. Patient left in no apparent distress.     See Patient Instructions  Return in about 1 week (around 11/2/2020), or if symptoms worsen or fail to improve.    ENRIQUETA Ceja  [facility]

## 2020-11-02 ENCOUNTER — OFFICE VISIT (OUTPATIENT)
Dept: FAMILY MEDICINE | Facility: CLINIC | Age: 50
End: 2020-11-02
Payer: COMMERCIAL

## 2020-11-02 VITALS
OXYGEN SATURATION: 99 % | WEIGHT: 186 LBS | DIASTOLIC BLOOD PRESSURE: 74 MMHG | SYSTOLIC BLOOD PRESSURE: 108 MMHG | HEART RATE: 79 BPM | BODY MASS INDEX: 31.93 KG/M2 | RESPIRATION RATE: 16 BRPM | TEMPERATURE: 97.2 F

## 2020-11-02 DIAGNOSIS — R21 RASH: Primary | ICD-10-CM

## 2020-11-02 PROCEDURE — 99213 OFFICE O/P EST LOW 20 MIN: CPT | Performed by: PHYSICIAN ASSISTANT

## 2020-11-02 RX ORDER — TRIAMCINOLONE ACETONIDE 1 MG/G
CREAM TOPICAL 2 TIMES DAILY
Qty: 30 G | Refills: 0 | Status: SHIPPED | OUTPATIENT
Start: 2020-11-02 | End: 2022-01-12

## 2020-11-02 RX ORDER — CLOTRIMAZOLE 1 %
CREAM (GRAM) TOPICAL 2 TIMES DAILY
Qty: 30 G | Refills: 0 | Status: SHIPPED | OUTPATIENT
Start: 2020-11-02 | End: 2022-01-12

## 2020-11-02 NOTE — PATIENT INSTRUCTIONS
Rachel Paniagua,    Thank you for allowing [facility] to manage your care.    I sent your prescriptions to your pharmacy.    For your pain, please use Ibuprofen 400mg four times daily with food. Between ibuprofen doses, you may use Tylenol 650mg.     Max acetaminophen (Tylenol) 4,000mg/24 hours  Max ibuprofen 3,200mg/24 hours    If you have any questions or concerns, please feel free to call us at [phone]    Sincerely,    Jimmie Tang PA-C    Did you know?      You can schedule a video visit for follow-up appointments as well as future appointments for certain conditions.  Please see the below link.     [email or web address]    If you have not already done so,  I encourage you to sign up for Mychart ([email or web address]).  This will allow you to review your results, securely communicate with a provider, and schedule virtual visits as well.      Patient Education     Ringworm of the Skin     Ringworm is a fungal infection of the skin. Despite the name, a worm doesn't cause it. The cause of ringworm is a fungus that infects the outer layers of the skin.  The medical term for ringworm is tinea. It can affect most parts of your body, although it seems to do better in moist areas of the body and around hair. It can be on almost any part of your body, including:    Arms, hands, legs, chest, feet, and back    Scalp    Beard    Groin    Between the toes  Depending on where it is located, sometimes the name changes. For example:    Tinea capitis (scalp)    Tinea cruris (groin)    Tinea corporis (body)    Tinea pedis (feet)  Causes  Ringworm is very common all over the world, including the [place] It can take less than 1 week up to 2 weeks before you develop the infection after being exposed. So, you may not figure out the exact cause.  It's spread through direct contact with:    An infected person or animal    Infected soil, or objects such as towels, clothing, and  patel  Symptoms  At first you might not notice ringworm. Or you may just see a small, red, often raised itchy spot or pimple. Sometimes there may only be one spot. At other times there may be several. Ringworm can look slightly different on different parts of the body, but there are some things are always present:    Irregular, round, oval or ring-shaped, which is why it's called ringworm    Clearer or lighter color at the center, since it spreads from the center of the spot outward    Red or inflamed look    Raised    Itchy    Scaly, dry, or flaky  Home care  Follow these tips to help care for yourself at home:    Leave it alone. Don't scratch at the rash or pick it. This can increase the chance of infection and scarring.    Take medicine as prescribed. If you were prescribed a cream, apply it exactly as directed. Make sure to put the cream not just on the rash, but also on the skin 1 or 2 inches around it. Medicine by mouth is sometimes needed, particularly for ringworm on the scalp. Take it as directed and until your healthcare provider says to stop. Some ringworm creams are now available without a prescription (over the counter). Talk with your healthcare provider about these, as they may be just as effective but less expensive than prescription medicines in some cases.    Keep it from spreading to others.  Untreated ringworm of the skin is contagious by skin-to-skin contact. An affected child may return to school 2 days after treatment has started.  Prevention  To some degree, prevention depends on what part of your body was affected. In general, the following good hygiene can help.    Clean up after you get dirty or sweaty, or after using a locker room.    When possible, don t share patel and brushes.    Avoid having your skin and feet wet or damp for long periods.    Wear clean, loose-fitting underwear.  Follow-up care  Follow up with your healthcare provider as advised by our staff if the rash does not  improve after 10 days of treatment or if the rash spreads to other areas of the body.  When to seek medical care  Call your healthcare provider right away if any of these occur:    Redness around the rash gets worse    Fluid drains from the rash    Fever of 100.4 F (38 C) or higher, or as directed by your healthcare provider  StayWell last reviewed this educational content on 8/1/2019 2000-2020 [employer]. [address] [place] [address], [place] [id number]. All rights reserved. This information is not intended as a substitute for professional medical care. Always follow your healthcare professional's instructions.

## 2020-11-02 NOTE — PROGRESS NOTES
Subjective     Arcelia Liz is a 50 year old female who presents to clinic today for the following health issues:    HPI         Rash  Used a perfume from her [place] in the left axilla and developed a rash. Both itchy and painful.     Onset/Duration: 1 week  Description  Location: left armpit  Character: black, big dot  Itching: nerve itching  Intensity:  moderate  Progression of Symptoms:  same  Accompanying signs and symptoms:   Fever: no  Body aches or joint pain: no  Sore throat symptoms: no  Recent cold symptoms: no  History:           Previous episodes of similar rash: None  New exposures:  ordered a vitamin from the internet, don't remember the name  Recent travel: no  Exposure to similar rash: no  Precipitating or alleviating factors: putting armpit next to body makes it worse  Therapies tried and outcome: a cream from our clinic; helped     Review of Systems   Constitutional, HEENT, cardiovascular, pulmonary, gi and gu systems are negative, except as otherwise noted.      Objective    /74   Pulse 79   Temp 97.2  F (36.2  C) (Tympanic)   Resp 16   Wt 84.4 kg (186 lb)   SpO2 99%   BMI 31.93 kg/m    Body mass index is 31.93 kg/m .  Physical Exam  Vitals signs and nursing note reviewed.   Constitutional:       General: She is not in acute distress.     Appearance: She is not ill-appearing or diaphoretic.   HENT:      Head: Normocephalic and atraumatic.      Mouth/Throat:      Mouth: Mucous membranes are moist.   Eyes:      Conjunctiva/sclera: Conjunctivae normal.   Cardiovascular:      Rate and Rhythm: Normal rate and regular rhythm.      Heart sounds: Normal heart sounds. No murmur. No friction rub. No gallop.    Pulmonary:      Effort: Pulmonary effort is normal. No respiratory distress.      Breath sounds: Normal breath sounds. No stridor. No wheezing, rhonchi or rales.   Skin:     General: Skin is warm and dry.      Comments: Round and hyperpigmented patch measuring 5cm to the left upper  chest and axilla. Scaling with excoriations. No pustules or vesicles.   Neurological:      General: No focal deficit present.      Mental Status: She is alert. Mental status is at baseline.   Psychiatric:         Mood and Affect: Mood normal.         Behavior: Behavior normal.          Assessment & Plan   Problem List Items Addressed This Visit     None      Visit Diagnoses     Rash    -  Primary    Relevant Medications    triamcinolone (KENALOG) 0.1 % external cream    clotrimazole (LOTRIMIN) 1 % external cream         It is my impression based on the historical events and the physical exam that this is dermatitis vs tinea corporis.  I do not believe the patient has cellulitis, abscess, zoster or necrotizing fasciitis.  Appears well nontoxic and I have low suspicion for anaphylaxis or other worrisome process.  Plan to trial both triamcinolone and clotrimazole creams.  Follow-up in 1 month if not resolving. Of note chest pain has resolved and she has a stress echo in 2 days.    Complete history and physical exam as above. AF with normal VS.    DDx and Dx discussed with and explained to the pt to their satisfaction.  All questions were answered at this time. Pt expressed understanding of and agreement with this dx, tx, and plan. No further workup warranted and standard medication warnings given. I have given the patient a list of pertinent indications for re-evaluation. Will go to the Emergency Department if symptoms worsen or new concerning symptoms arise. Patient left in no apparent distress.     See Patient Instructions  Return in about 2 weeks (around 11/16/2020), or if symptoms worsen or fail to improve.    ENRIQUETA Ceja  [facility]

## 2020-11-03 ENCOUNTER — TRANSFERRED RECORDS (OUTPATIENT)
Dept: HEALTH INFORMATION MANAGEMENT | Facility: CLINIC | Age: 50
End: 2020-11-03

## 2020-11-22 ENCOUNTER — HEALTH MAINTENANCE LETTER (OUTPATIENT)
Age: 50
End: 2020-11-22

## 2020-12-28 ENCOUNTER — NURSE TRIAGE (OUTPATIENT)
Dept: FAMILY MEDICINE | Facility: CLINIC | Age: 50
End: 2020-12-28

## 2020-12-28 ENCOUNTER — VIRTUAL VISIT (OUTPATIENT)
Dept: FAMILY MEDICINE | Facility: CLINIC | Age: 50
End: 2020-12-28
Payer: COMMERCIAL

## 2020-12-28 DIAGNOSIS — U07.1 2019 NOVEL CORONAVIRUS DISEASE (COVID-19): ICD-10-CM

## 2020-12-28 DIAGNOSIS — R07.89 OTHER CHEST PAIN: Primary | ICD-10-CM

## 2020-12-28 PROCEDURE — 99215 OFFICE O/P EST HI 40 MIN: CPT | Performed by: PHYSICIAN ASSISTANT

## 2020-12-28 NOTE — PATIENT INSTRUCTIONS
Rachel Paniagua,    Thank you for allowing [facility] to manage your care.    Please go to urgent care for evaluation today or the ER if you worsen/change.    If you have any questions or concerns, please feel free to call us at [phone]    Sincerely,    Jimmie Tang PA-C    Did you know?      You can schedule a video visit for follow-up appointments as well as future appointments for certain conditions.  Please see the below link.     [email or web address]    If you have not already done so,  I encourage you to sign up for [facility] ([email or web address]).  This will allow you to review your results, securely communicate with a provider, and schedule virtual visits as well.

## 2020-12-28 NOTE — PROGRESS NOTES
"Arcelia Liz is a 50 year old female who is being evaluated via a billable video visit.      The patient has been notified of following:     \"This video visit will be conducted via a call between you and your physician/provider. We have found that certain health care needs can be provided without the need for an in-person physical exam.  This service lets us provide the care you need with a video conversation.  If a prescription is necessary we can send it directly to your pharmacy.  If lab work is needed we can place an order for that and you can then stop by our lab to have the test done at a later time.    Video visits are billed at different rates depending on your insurance coverage.  Please reach out to your insurance provider with any questions.    If during the course of the call the physician/provider feels a video visit is not appropriate, you will not be charged for this service.\"    Patient has given verbal consent for Video visit? Yes  How would you like to obtain your AVS? MyChart  If you are dropped from the video visit, the video invite should be resent to: Text to cell phone: [phone]  Will anyone else be joining your video visit? No  Subjective     Arcelia Liz is a 50 year old female who presents today via video visit for the following health issues:    HPI     Chest Pain  Onset/Duration: started yesterday morning and twice again for ~5min. Radiates from chest to back. No sob. Sometimes pleuritic. Diagnosed with COVID-19 recently and was feeling improved. Symptoms began around 12/11/20. Cannot tell if her symptoms are similar to pat chest pain episodes with negative workup.    Description:   Location: entire chest  Character: sharp and tight  Radiation: None  Duration: 5 minutes   Intensity: moderate  Progression of Symptoms: worsening and intermittent  Accompanying Signs & Symptoms:  Shortness of breath: no  Sweating: no  Nausea/vomiting: no  Lightheadedness: YES  Palpitations: " no  Fever/Chills: no  Cough: YES           Heartburn: no  History:   Family history of heart disease: YES- brother  Tobacco use: no  Previous similar symptoms: YES  Precipitating factors:   Worse with exertion: no  Worse with deep breaths: no           Related to eating: no           Better with burping: no  Alleviating factors: No  Therapies tried and outcome: Tylenol    Video Start Time: 10:42 AM    Review of Systems   Constitutional, HEENT, cardiovascular, pulmonary, gi and gu systems are negative, except as otherwise noted.      Objective           Vitals:  No vitals were obtained today due to virtual visit.    Physical Exam     GENERAL: Healthy, alert and no distress  EYES: Eyes grossly normal to inspection.  No discharge or erythema, or obvious scleral/conjunctival abnormalities.  RESP: No audible wheeze, cough, or visible cyanosis.  No visible retractions or increased work of breathing.    SKIN: Visible skin clear. No significant rash, abnormal pigmentation or lesions.  NEURO: Cranial nerves grossly intact.  Mentation and speech appropriate for age.  PSYCH: Mentation appears normal, affect normal/bright, judgement and insight intact, normal speech and appearance well-groomed.       Assessment & Plan   Problem List Items Addressed This Visit     None      Visit Diagnoses     Other chest pain    -  Primary    2019 novel coronavirus disease (COVID-19)             Impression is chest pain from unknown etiology.  Appears well and non-toxic and I have low suspicion for impending airway obstruction or respiratory distress. Brief episodes, however given patient's recent COVID-19 diagnosis would consider multiple etiologies. Not appropriate for video evaluation. Patient asymptomatic currently and she agreed to go to urgent care immediately for evaluation.    DDx discussed with and explained to the pt to their satisfaction.  All questions were answered at this time. Pt expressed understanding of and agreement with this  plan. Will go to the Emergency Department if symptoms worsen or new concerning symptoms arise. Patient left the call in no apparent distress.     See Patient Instructions  Return for ER evaluation if not improving or anytime if worse.    ENRIQUETA Ceja  [facility]      Video-Visit Details    Type of service:  Video Visit    Video End Time:10:49 AM    Originating Location (pt. Location): Home    Distant Location (provider location):  [facility]     Platform used for Video Visit: ChintanPaperwoven

## 2020-12-28 NOTE — TELEPHONE ENCOUNTER
Pt tested positive for COVID on 12/21 at a [facility].  Symptoms: coughing, some dizziness, and mild intermittent chest pain (rated 4/10) which comes and goes. No chest pressure or tightness. No breathing issues, no SOB, no fever. Also pain in her back. Says that it started yesterday. Occurs maybe 2x/day.   Video visit scheduled at 10:20am this morning with:  ENRIQUETA Ceja  [facility]    Additional Information    Negative: Severe difficulty breathing (e.g., struggling for each breath, speaks in single words)    Negative: Passed out (i.e., fainted, collapsed and was not responding)    Negative: Chest pain lasting longer than 5 minutes and ANY of the following:* Over 50 years old* Over 30 years old and at least one cardiac risk factor (i.e., high blood pressure, diabetes, high cholesterol, obesity, smoker or strong family history of heart disease)* Pain is crushing, pressure-like, or heavy * Took nitroglycerin and chest pain was not relieved* History of heart disease (i.e., angina, heart attack, bypass surgery, angioplasty, CHF)    Negative: Visible sweat on face or sweat dripping down face    Negative: Sounds like a life-threatening emergency to the triager    Negative: SEVERE chest pain    Negative: Pain also present in shoulder(s) or arm(s) or jaw    Negative: Difficulty breathing    Negative: Cocaine use within last 3 days    Negative: History of prior 'blood clot' in leg or lungs (i.e., deep vein thrombosis, pulmonary embolism)    Negative: Recent illness requiring prolonged bed rest (i.e., immobilization)    Negative: Hip or leg fracture in past 2 months (e.g, or had cast on leg or ankle)    Negative: Major surgery in the past month    Negative: Recent long-distance travel with prolonged time in car, bus, plane, or train (i.e., within past 2 weeks; 6 or more hours duration)    Negative: Heart beating irregularly or very rapidly    Negative: Chest pain lasting longer than 5 minutes     Negative: Intermittent chest pain and pain has been increasing in severity or frequency    Negative: Dizziness or lightheadedness    Negative: Coughing up blood    Negative: Patient sounds very sick or weak to the triager    Negative: Fever > 100.5 F (38.1 C)    Negative: Intermittent chest pains persist > 3 days    Negative: All other patients with chest pain    Negative: Patient wants to be seen    Intermittent mild chest pain lasting a few seconds each time    Protocols used: CHEST PAIN-A-OH

## 2020-12-31 ENCOUNTER — TELEPHONE (OUTPATIENT)
Dept: FAMILY MEDICINE | Facility: CLINIC | Age: 50
End: 2020-12-31

## 2020-12-31 NOTE — TELEPHONE ENCOUNTER
I spoke with daughter Anish regarding her mom.   Positive Covid screen 12/21/20.   Video Visit completed with Jimmie ROBISON 12/28/20.     ED note from [facility] 12/29/20 states:  Follow-up lung nodule clinic.  Follow-up Dr. Reveles pulmonologist for bronchiectasis (has appointment scheduled on 1/13/21).   Levaquin  ProAir inhaler  Prednisone  Return to ED for worse or new symptoms       I reviewed patient's  current symptoms listed in previous note. According to daughter, her Mom drank a total of 8 ounces of water today because she is feeling nauseated. No diarrhea reported. She has no appetite. She has voided 3 times.She continues to have chest tightness and palpitations. She complains that her legs feel weak & heavy (new symptom).  No swelling reported. She denied chest pain during our call. Daughter states that her mom is not short of breath, however has difficulty breathing sometimes.     With further discussion, daughter agreed to take her mom back to the ED for further evaluation.     Jazmyne Sims RN BSN  [facility]

## 2020-12-31 NOTE — TELEPHONE ENCOUNTER
Patient's daughter calling she is with patient, Patient was seen at [facility] 12/28/20, was to ED on 12/30/20. Patient has COVID and pneumonia, still is having chest pain, palpitations, chest is tight, now new onset legs feel very heavy. Daughter is concerned.  Left her number she is with patient. Please call to advise Anish [phone]    OLAF Eastman

## 2021-01-15 ENCOUNTER — TRANSFERRED RECORDS (OUTPATIENT)
Dept: HEALTH INFORMATION MANAGEMENT | Facility: CLINIC | Age: 51
End: 2021-01-15

## 2021-01-18 ENCOUNTER — VIRTUAL VISIT (OUTPATIENT)
Dept: FAMILY MEDICINE | Facility: CLINIC | Age: 51
End: 2021-01-18
Payer: COMMERCIAL

## 2021-01-18 DIAGNOSIS — R10.9 RIGHT FLANK PAIN: Primary | ICD-10-CM

## 2021-01-18 PROCEDURE — 99214 OFFICE O/P EST MOD 30 MIN: CPT | Mod: 95 | Performed by: PHYSICIAN ASSISTANT

## 2021-01-18 NOTE — PATIENT INSTRUCTIONS
Rachel Paniagua,    Thank you for allowing [facility] to manage your care.    I am unsure of the cause of your symptoms, but your exam is reassuring. We will see what our workup shows.     If you develop worsening/changing symptoms at any time, please go to the emergency department for evaluation.    I ordered some blood work, please go to the laboratory to get your laboratory studies.    I ordered an ultrasound, please call diagnostic imaging [phone] to schedule your test.    Please allow 1-2 business days for our office to contact you in regards to your laboratory/radiological studies.  If not done so, I encourage you to login into [employer] ([email or web address] to review your results as well.     If you have any questions or concerns, please feel free to call us at [phone]    Sincerely,    Jimmie Tang PA-C    Did you know?      You can schedule a video visit for follow-up appointments as well as future appointments for certain conditions.  Please see the below link.     [email or web address]    If you have not already done so,  I encourage you to sign up for [employer] ([email or web address]).  This will allow you to review your results, securely communicate with a provider, and schedule virtual visits as well.      Patient Education     Flank Pain with Uncertain Cause    The flank is the area between your upper belly (abdomen) and your back. Pain there is often caused by a problem with your kidneys. It might be a kidney infection or a kidney stone. Other causes of flank pain include spinal arthritis, a pinched nerve from a back injury, a rib injury, a bruise, a back muscle strain, inflammation, or spasm.  The cause of your flank pain is not certain. You may need other tests.  Home care  Follow these tips when caring for yourself at home:    You may use acetaminophen or ibuprofen to control pain, unless your healthcare provider  prescribed another medicine. If you have chronic liver or kidney disease, talk with your provider before taking these medicines. Also talk with your provider first if you ve ever had a stomach ulcer or digestive bleeding.    If the pain is coming from your muscles, you may get relief with ice or heat. During the first 2 days after the injury, put an ice pack on the painful area for 20 minutes every 2 to 4 hours. This will reduce swelling and pain. A hot shower, hot bath, or heating pad works well for a muscle spasm. You can start with ice, then switch to heat after 2 days. You might find that alternating ice and heat works well. Use the method that feels the best to you.  Follow-up care  Follow up with your healthcare provider if your symptoms don t get better over the next few days.  When to seek medical advice  Call your healthcare provider right away if any of these happen:    Repeated vomiting    Fever of 100.4 F (38 C) or higher, or as directed by your healthcare provider    Flank pain that gets worse    Pain that spreads to the front of your belly (abdomen)    Dizziness, weakness, or fainting    Blood in your urine    Burning feeling when you urinate or the need to urinate often    Pain in one of your legs that gets worse    Numbness or weakness in a leg  [employer] last reviewed this educational content on 10/1/2019    6357-5962 [employer]. [address] [place] [address], [place] [address]. All rights reserved. This information is not intended as a substitute for professional medical care. Always follow your healthcare professional's instructions.

## 2021-01-18 NOTE — PROGRESS NOTES
Arcelia is a 50 year old who is being evaluated via a billable telephone visit.      What phone number would you like to be contacted at? [phone]  How would you like to obtain your AVS? [facility]  Assessment & Plan   Problem List Items Addressed This Visit     None      Visit Diagnoses     Right flank pain    -  Primary    Relevant Orders    *UA reflex to Microscopic and Culture ([place] [facility] (except [place] and [place]) (Completed)    CBC with platelets (Completed)    Comprehensive metabolic panel (BMP + Alb, Alk Phos, ALT, AST, Total. Bili, TP) (Completed)    Lipase (Completed)    US Abdomen Complete         Arcelia Liz is a 50 year old female w/ left sided flank pain.    No rash per her report so I can not diagnosis this to be shingles.    I do not believe a fracture to be the source of this patient's pain as there was no preceding trauma.  Based on this, no imaging of the spine was done.      I do not believe the pain is caused by an epidural abscess as the patient denies hx of IV drug use nor recent procedures.      I do not believe this patient's pain is from an infiltrative vertebral tumor as the patient does not have weight loss or night sweats and no known history of cancer.      Urinalysis shows no UTI/hematuria    CBC, comp and lipase pending.     US also pending.    Based on history, exam and diagnostic studies, the most likely cause of this patient's flank pain is unclear with workup pending. I recommended that she go to urgent care or see me in clinic, but she declined, acknowledging understanding that this could lead to delayed treatment and bad outcome. She agreed to make an appointment or go to ER/UC should she change her mind or worsen/change.    DDx and Dx discussed with and explained to the pt to their satisfaction.  All questions were answered at this time. Pt expressed understanding of and agreement with this dx, tx, and plan. No further workup warranted and standard  medication warnings given. I have given the patient a list of pertinent indications for re-evaluation. Will go to the Emergency Department if symptoms worsen or new concerning symptoms arise. Patient left in no apparent distress.       22 minutes spent on the date of the encounter doing chart review, history and exam, documentation and further activities as noted above    See Patient Instructions    Return for ER evaluation if not improving or anytime if worse.    ENRIQUETA Ceja  [facility] YANIRA Paniagua is a 50 year old who presents to clinic today for the following health issues:    HPI     Left sided flank pain radiating toward spine. Numbness in both legs on back of ankles proximally to knees. Heavy glass jar fell on her head 4-5 weeks ago. No headaches or neck pain. No surgeries to back. Similar pain last year before gallbladder was removed. No incontinence or saddle anesthesia.    Back Pain  Onset/Duration: 3 weeks  Description:   Location of pain: upper middle back, left side  Character of pain: dull ache and intermittent  Pain radiation: up to left shoulder  New numbness or weakness in legs, not attributed to pain: YES- both, but minor  Intensity: Currently 4/10  Progression of Symptoms: same  History:   Specific cause: none  Pain interferes with job: no  History of back problems: no prior back problems  Any previous MRI or X-rays: None  Sees a specialist for back pain: No  Alleviating factors:   Improved by: Tylenol    Precipitating factors:  Worsened by: Nothing  Therapies tried and outcome: Tylenol, hot pack    Accompanying Signs & Symptoms:  Risk of Fracture: None  Risk of Cauda Equina: None  Risk of Infection: None  Risk of Cancer: None  Risk of Ankylosing Spondylitis: Onset at age <35, male, AND morning back stiffness no    Review of Systems   Constitutional, HEENT, cardiovascular, pulmonary, gi and gu systems are negative, except as otherwise noted.       Objective       Vitals:  No vitals were obtained today due to virtual visit.    Physical Exam   healthy, alert and no distress  PSYCH: Alert and oriented times 3; coherent speech, normal   rate and volume, able to articulate logical thoughts, able   to abstract reason, no tangential thoughts, no hallucinations   or delusions  Her affect is normal and pleasant  RESP: No cough, no audible wheezing, able to talk in full sentences  Remainder of exam unable to be completed due to telephone visits    US abdomen, UA, CBC, Comp panel, and lipase pending.      Phone call duration: 11 minutes

## 2021-01-19 DIAGNOSIS — R10.9 RIGHT FLANK PAIN: ICD-10-CM

## 2021-01-19 LAB
ALBUMIN SERPL-MCNC: 3.8 G/DL (ref 3.4–5)
ALBUMIN UR-MCNC: NEGATIVE MG/DL
ALP SERPL-CCNC: 100 U/L (ref 40–150)
ALT SERPL W P-5'-P-CCNC: 21 U/L (ref 0–50)
ANION GAP SERPL CALCULATED.3IONS-SCNC: 6 MMOL/L (ref 3–14)
APPEARANCE UR: CLEAR
AST SERPL W P-5'-P-CCNC: 13 U/L (ref 0–45)
BILIRUB SERPL-MCNC: 0.3 MG/DL (ref 0.2–1.3)
BILIRUB UR QL STRIP: NEGATIVE
BUN SERPL-MCNC: 11 MG/DL (ref 7–30)
CALCIUM SERPL-MCNC: 9.3 MG/DL (ref 8.5–10.1)
CHLORIDE SERPL-SCNC: 106 MMOL/L (ref 94–109)
CO2 SERPL-SCNC: 27 MMOL/L (ref 20–32)
COLOR UR AUTO: YELLOW
CREAT SERPL-MCNC: 0.64 MG/DL (ref 0.52–1.04)
ERYTHROCYTE [DISTWIDTH] IN BLOOD BY AUTOMATED COUNT: 14.7 % (ref 10–15)
GFR SERPL CREATININE-BSD FRML MDRD: >90 ML/MIN/{1.73_M2}
GLUCOSE SERPL-MCNC: 84 MG/DL (ref 70–99)
GLUCOSE UR STRIP-MCNC: NEGATIVE MG/DL
HCT VFR BLD AUTO: 39.1 % (ref 35–47)
HGB BLD-MCNC: 12.7 G/DL (ref 11.7–15.7)
HGB UR QL STRIP: NEGATIVE
KETONES UR STRIP-MCNC: NEGATIVE MG/DL
LEUKOCYTE ESTERASE UR QL STRIP: NEGATIVE
LIPASE SERPL-CCNC: 87 U/L (ref 73–393)
MCH RBC QN AUTO: 28 PG (ref 26.5–33)
MCHC RBC AUTO-ENTMCNC: 32.5 G/DL (ref 31.5–36.5)
MCV RBC AUTO: 86 FL (ref 78–100)
NITRATE UR QL: NEGATIVE
PH UR STRIP: 7 PH (ref 5–7)
PLATELET # BLD AUTO: 235 10E9/L (ref 150–450)
POTASSIUM SERPL-SCNC: 4.1 MMOL/L (ref 3.4–5.3)
PROT SERPL-MCNC: 7.4 G/DL (ref 6.8–8.8)
RBC # BLD AUTO: 4.54 10E12/L (ref 3.8–5.2)
SODIUM SERPL-SCNC: 139 MMOL/L (ref 133–144)
SOURCE: NORMAL
SP GR UR STRIP: 1.02 (ref 1–1.03)
UROBILINOGEN UR STRIP-ACNC: 0.2 EU/DL (ref 0.2–1)
WBC # BLD AUTO: 4.8 10E9/L (ref 4–11)

## 2021-01-19 PROCEDURE — 80053 COMPREHEN METABOLIC PANEL: CPT | Performed by: PHYSICIAN ASSISTANT

## 2021-01-19 PROCEDURE — 83690 ASSAY OF LIPASE: CPT | Performed by: PHYSICIAN ASSISTANT

## 2021-01-19 PROCEDURE — 36415 COLL VENOUS BLD VENIPUNCTURE: CPT | Performed by: PHYSICIAN ASSISTANT

## 2021-01-19 PROCEDURE — 81003 URINALYSIS AUTO W/O SCOPE: CPT | Performed by: PHYSICIAN ASSISTANT

## 2021-01-19 PROCEDURE — 85027 COMPLETE CBC AUTOMATED: CPT | Performed by: PHYSICIAN ASSISTANT

## 2021-01-21 ENCOUNTER — ANCILLARY PROCEDURE (OUTPATIENT)
Dept: ULTRASOUND IMAGING | Facility: CLINIC | Age: 51
End: 2021-01-21
Attending: PHYSICIAN ASSISTANT
Payer: COMMERCIAL

## 2021-01-21 DIAGNOSIS — R10.9 RIGHT FLANK PAIN: ICD-10-CM

## 2021-01-21 PROCEDURE — 76700 US EXAM ABDOM COMPLETE: CPT | Performed by: RADIOLOGY

## 2021-01-21 NOTE — PROGRESS NOTES
Assessment & Plan   Problem List Items Addressed This Visit     None      Visit Diagnoses     Acute left-sided low back pain, unspecified whether sciatica present    -  Primary    Relevant Medications    cyclobenzaprine (FLEXERIL) 10 MG tablet    Other Relevant Orders    CT Chest/Abdomen/Pelvis w Contrast         Arcelia Liz is a 50 year old female w/ left sided flank pain for 3+ weeks.    No rash per her report so I can not diagnosis this to be shingles.    I do not believe a fracture to be the source of this patient's pain as there was no preceding trauma.  Based on this, no imaging of the spine was done.      I do not believe the pain is caused by an epidural abscess as the patient denies hx of IV drug use nor recent procedures.      I do not believe this patient's pain is from an infiltrative vertebral tumor as the patient does not have weight loss or night sweats and no known history of cancer.      Urinalysis shows no UTI/hematuria    CBC, comp and lipase not concerning from 3 days ago.    US shows no worrisome findings    Unlikely PE given it is not pleuritic and she is neither tachycardic nor hypoxic.     Based on history, exam and diagnostic studies, the most likely cause of this patient's flank pain is unclear. Could be muscular given she recently started boxing as a workout. . I recommended that she have a CT to look for kidney stones or other worrisome findings, but she wishes to try conservative treatment with otc meds, Flexeril, rice/heat, and surveillance CT of chest with abd/pelv in 1-2 months to look at stability of post COVID changes to lungs and investigate her flank pain further if unchanged.      DDx and Dx discussed with and explained to the pt to their satisfaction.  All questions were answered at this time. Pt expressed understanding of and agreement with this dx, tx, and plan. No further workup warranted and standard medication warnings given. I have given the patient a list of pertinent  indications for re-evaluation. Will go to the Emergency Department if symptoms worsen or new concerning symptoms arise. Patient left in no apparent distress.        22 minutes spent on the date of the encounter doing chart review, history and exam, documentation and further activities as noted above     See Patient Instructions     Return for ER evaluation if not improving or anytime if worse.     ENRIQUETA Ceja [facility] YANIRA     34 minutes spent on the date of the encounter doing chart review, history and exam, documentation and further activities as noted above    See Patient Instructions    Return for ER evaluation if not improving or anytime if worse.    ENRIQUETA Ceja [facility] YANIRA Paniagua is a 50 year old who presents to clinic today for the following health issues:    HPI      Scott Paniagua is a 50 year old who presents to clinic today for the following health issues:     HPI      Left sided flank pain radiating toward spine. Numbness in both legs on back of ankles proximally to knees. Heavy glass jar fell on her head 4-5 weeks ago. No headaches or neck pain. No surgeries to back. Similar pain last year before gallbladder was removed. No incontinence or saddle anesthesia.     Back Pain  Onset/Duration: 3 weeks  Description:   Location of pain: upper middle back, left side  Character of pain: dull ache and intermittent jolts of pain.  Pain radiation: up to left shoulder  New numbness or weakness in legs, not attributed to pain: YES- both, but minor  Intensity: Currently 4/10  Progression of Symptoms: same  History:   Specific cause: none  Pain interferes with job: no  History of back problems: no prior back problems  Any previous MRI or X-rays: None  Sees a specialist for back pain: No  Alleviating factors:   Improved by: Tylenol    Precipitating factors:  Worsened by: Nothing  Therapies tried and outcome: Tylenol, hot  "pack     Accompanying Signs & Symptoms:  Risk of Fracture: None  Risk of Cauda Equina: None  Risk of Infection: None  Risk of Cancer: None  Risk of Ankylosing Spondylitis: Onset at age <35, male, AND morning back stiffness no  }  Review of Systems   Constitutional, HEENT, cardiovascular, pulmonary, gi and gu systems are negative, except as otherwise noted.      Objective    /80   Pulse 74   Temp 95.6  F (35.3  C) (Tympanic)   Resp 16   Ht 1.66 m (5' 5.35\")   Wt 82.8 kg (182 lb 9.6 oz)   LMP 12/06/2020 (Exact Date)   SpO2 99%   BMI 30.06 kg/m    Body mass index is 30.06 kg/m .  Physical Exam  Vitals signs and nursing note reviewed.   Constitutional:       General: She is not in acute distress.     Appearance: She is not ill-appearing or diaphoretic.   HENT:      Head: Normocephalic and atraumatic.      Mouth/Throat:      Mouth: Mucous membranes are moist.   Eyes:      Conjunctiva/sclera: Conjunctivae normal.   Cardiovascular:      Rate and Rhythm: Normal rate and regular rhythm.      Heart sounds: Normal heart sounds. No murmur. No friction rub. No gallop.    Pulmonary:      Effort: Pulmonary effort is normal. No respiratory distress.      Breath sounds: Normal breath sounds. No stridor. No wheezing, rhonchi or rales.   Abdominal:      General: Bowel sounds are normal. There is no distension.      Palpations: Abdomen is soft. There is no mass.      Tenderness: There is no abdominal tenderness. There is no guarding or rebound.      Hernia: No hernia is present.   Musculoskeletal:      Comments: No CVA or midline spinal tenderness. No overlying signs of trauma or infection.   Skin:     General: Skin is warm and dry.   Neurological:      General: No focal deficit present.      Mental Status: She is alert. Mental status is at baseline.      Comments: Able to toe lift, heel walk and knee bend.   Psychiatric:         Mood and Affect: Mood normal.         Behavior: Behavior normal.      CT chest/abd/pelv w iv " contrast to monitor pulmonary changes and investigate flank pain (if still present) pending in 5-6 weeks.

## 2021-01-21 NOTE — RESULT ENCOUNTER NOTE
I called patient with results. Continued left thoracic back pain. Will schedule for face to face tomorrow. All questions and concerns addressed.    ENRIQUETA Ceja

## 2021-01-22 ENCOUNTER — OFFICE VISIT (OUTPATIENT)
Dept: FAMILY MEDICINE | Facility: CLINIC | Age: 51
End: 2021-01-22
Payer: COMMERCIAL

## 2021-01-22 VITALS
HEIGHT: 65 IN | OXYGEN SATURATION: 99 % | BODY MASS INDEX: 30.42 KG/M2 | WEIGHT: 182.6 LBS | DIASTOLIC BLOOD PRESSURE: 80 MMHG | HEART RATE: 74 BPM | TEMPERATURE: 95.6 F | RESPIRATION RATE: 16 BRPM | SYSTOLIC BLOOD PRESSURE: 111 MMHG

## 2021-01-22 DIAGNOSIS — M54.50 ACUTE LEFT-SIDED LOW BACK PAIN, UNSPECIFIED WHETHER SCIATICA PRESENT: Primary | ICD-10-CM

## 2021-01-22 PROCEDURE — 99213 OFFICE O/P EST LOW 20 MIN: CPT | Performed by: PHYSICIAN ASSISTANT

## 2021-01-22 RX ORDER — CYCLOBENZAPRINE HCL 10 MG
5-10 TABLET ORAL 3 TIMES DAILY PRN
Qty: 25 TABLET | Refills: 0 | Status: SHIPPED | OUTPATIENT
Start: 2021-01-22 | End: 2021-04-26

## 2021-01-22 ASSESSMENT — MIFFLIN-ST. JEOR: SCORE: 1449.76

## 2021-01-22 NOTE — PATIENT INSTRUCTIONS
Rachel Paniagua,    Thank you for allowing [facility] to manage your care.    I am unsure of the cause of your symptoms, but your exam is reassuring. This could be a muscle strain. We will see what our workup shows.     If you develop worsening/changing symptoms at any time, please go to the emergency department for evaluation.    I sent your prescriptions to your pharmacy.    For your pain, please use Ibuprofen 400mg four times daily with food. Between ibuprofen doses, you may use Tylenol 650mg.     Max acetaminophen (Tylenol) 4,000mg/24 hours  Max ibuprofen 3,200mg/24 hours    For severe pain not controlled by over the counter medications, please use cyclobenzaprine as prescribed. Do not use this medication while driving, operating machinery, with other sedating medications, or while drinking alcohol as it will make you drowsy.    I ordered a CT to be done in early March, please call diagnostic imaging [phone] to schedule your test.    Please allow 1-2 business days for our office to contact you in regards to your laboratory/radiological studies.  If not done so, I encourage you to login into [employer] ([email or web address] to review your results as well.     If you have any questions or concerns, please feel free to call us at [phone]    Sincerely,    Jimmie Tang PA-C    Did you know?      You can schedule a video visit for follow-up appointments as well as future appointments for certain conditions.  Please see the below link.     [email or web address]    If you have not already done so,  I encourage you to sign up for [employer] ([email or web address]).  This will allow you to review your results, securely communicate with a provider, and schedule virtual visits as well.      Patient Education     General Neck and Back Pain    Both neck and back pain are usually caused by injury to the muscles or ligaments of the spine. Sometimes  the disks that separate each bone of the spine may cause pain by pressing on a nearby nerve. Back and neck pain may appear after a sudden twisting or bending force (such as in a car accident), or sometimes after a simple awkward movement. In either case, muscle spasm is often present and adds to the pain.   Acute neck and back pain usually gets better in 1 to 2 weeks. Pain related to disk disease, arthritis in the spinal joints, or narrowing of the spinal canal (spinal stenosis) can become chronic and last for months or years.   Back and neck pain are common problems. Most people feel better in 1 or 2 weeks, and most of the rest in 1 to 2 months. Most people can remain active.   People have and describe pain differently.    Pain can be sharp, stabbing, shooting, aching, cramping, or burning    Movement, standing, bending, lifting, sitting, or walking may worsen the pain    Pain can be limited to one spot or area, or it can be more generalized    Pain can spread upward, downward, to the front, or go down your arms or legs    Muscle spasm may occur.  Most of the time mechanical problems with the muscles or spine cause the pain. It's usually caused by an injury, whether known or not, to the muscles or ligaments. Pain without an injury is not common. But it can sometimes be caused by a health problem such as kidney stones or an infection. Pain is usually related to physical activity such as sports, exercise, work, or normal activity. Sometimes it can occur without an identifiable cause. This can happen simply by stretching or moving wrong, without noting pain at the time. Other causes include:     Overexertion, lifting, pushing, pulling incorrectly or too aggressively.    Sudden twisting, bending or stretching from an accident (car or fall), or accidental movement.    Poor posture    Poor conditioning, lack of regular exercise    Spinal disc disease or arthritis    Stress    Pregnancy, or illness like appendicitis,  bladder or kidney infection, pelvic infections   Home care    For neck pain: Use a comfortable pillow that supports the head and keeps the spine in a neutral position. The position of the head should not be tilted forward or backward.    When in bed, try to find a position of comfort. A firm mattress is best. Try lying flat on your back with pillows under your knees. You can also try lying on your side with your knees bent up towards your chest and a pillow between your knees.    At first, don't try to stretch out the sore spots. If there is a strain, it's not like the good soreness you get after exercising without an injury. In this case, stretching may make it worse.    Don't sit for long periods, as in long car rides or other travel. This puts more stress on the lower back than standing or walking.    During the first 24 to 72 hours after an injury, apply an ice pack to the painful area for 20 minutes and then remove it for 20 minutes over a period of 60 to 90 minutes or several times a day.     You can alternate ice and heat therapies. Talk with your healthcare provider about the best treatment for your back or neck pain. As a safety precaution, don't use a heating pad at bedtime. Sleeping with a heating pad can lead to skin burns or tissue damage.    Therapeutic massage can help relax the back and neck muscles without stretching them.    Be aware of safe lifting methods and don't lift anything over 15 pounds until all the pain is gone.    Medicines  Talk to your healthcare provider before using medicine, especially if you have other medical problems or are taking other medicines.     You may use over-the-counter medicine to control pain, unless another pain medicine was prescribed. Talk with your doctor first if you have chronic conditions like diabetes, liver or kidney disease, stomach ulcers, gastrointestinal bleeding, or are taking blood thinner medicines.    Be careful if you are given pain medicines,  narcotics, or medicine for muscle spasm. They can cause drowsiness, and can affect your coordination, reflexes, and judgment. Don't drive or operate heavy machinery.  Follow-up care  Follow up with your healthcare provider, or as advised. You may need physical therapy or further tests.   If X-rays were taken, you will be told of any new findings that may affect your care.   Call 911  Call 911 if any of the following occur:     Trouble breathing    Confusion    Very drowsy or trouble awakening    Fainting or loss of consciousness    Rapid or very slow heart rate    Loss of bowel or bladder control  When to seek medical advice  Call your healthcare provider right away if any of these occur:    Pain becomes worse or spreads into your arms or legs    Weakness, numbness or pain in one or both arms or legs    Numbness in the groin area    Trouble walking    Fever of 100.4 F (38 C) or higher, or as directed by your healthcare provider  Pamela last reviewed this educational content on 7/1/2016 2000-2020 [employer]. [address] [place] [address], [place] [address]. All rights reserved. This information is not intended as a substitute for professional medical care. Always follow your healthcare professional's instructions.

## 2021-02-13 ENCOUNTER — HEALTH MAINTENANCE LETTER (OUTPATIENT)
Age: 51
End: 2021-02-13

## 2021-03-10 ENCOUNTER — ANCILLARY PROCEDURE (OUTPATIENT)
Dept: CT IMAGING | Facility: CLINIC | Age: 51
End: 2021-03-10
Attending: PHYSICIAN ASSISTANT
Payer: COMMERCIAL

## 2021-03-10 DIAGNOSIS — M54.50 ACUTE LEFT-SIDED LOW BACK PAIN, UNSPECIFIED WHETHER SCIATICA PRESENT: ICD-10-CM

## 2021-03-10 PROCEDURE — 71260 CT THORAX DX C+: CPT | Mod: TC | Performed by: RADIOLOGY

## 2021-03-10 PROCEDURE — 74177 CT ABD & PELVIS W/CONTRAST: CPT | Mod: TC | Performed by: RADIOLOGY

## 2021-03-10 RX ORDER — IOPAMIDOL 755 MG/ML
90 INJECTION, SOLUTION INTRAVASCULAR ONCE
Status: COMPLETED | OUTPATIENT
Start: 2021-03-10 | End: 2021-03-10

## 2021-03-10 RX ADMIN — IOPAMIDOL 90 ML: 755 INJECTION, SOLUTION INTRAVASCULAR at 09:01

## 2021-03-26 ENCOUNTER — RECORDS - HEALTHEAST (OUTPATIENT)
Dept: LAB | Facility: CLINIC | Age: 51
End: 2021-03-26

## 2021-03-26 LAB
SARS-COV-2 PCR COMMENT: NORMAL
SARS-COV-2 RNA SPEC QL NAA+PROBE: NEGATIVE
SARS-COV-2 VIRUS SPECIMEN SOURCE: NORMAL

## 2021-03-29 ENCOUNTER — RECORDS - HEALTHEAST (OUTPATIENT)
Dept: LAB | Facility: CLINIC | Age: 51
End: 2021-03-29

## 2021-04-23 ENCOUNTER — IMMUNIZATION (OUTPATIENT)
Dept: NURSING | Facility: CLINIC | Age: 51
End: 2021-04-23
Payer: COMMERCIAL

## 2021-04-23 PROCEDURE — 91300 PR COVID VAC PFIZER DIL RECON 30 MCG/0.3 ML IM: CPT

## 2021-04-23 PROCEDURE — 0001A PR COVID VAC PFIZER DIL RECON 30 MCG/0.3 ML IM: CPT

## 2021-04-23 NOTE — PROGRESS NOTES
"Patient arrived for their first Pfizer COVID-19 Vaccine today. Patient's chart stated that the patient received the Latesha vaccine on 4/8/21 in the reconcile tab. Nurse asked the patient if they had received the Latesha vaccine or any other COVID-19 vaccine before. Patient stated \"no, this is my first vaccine.\" Nurse reconciled immunizations, deleted the immunization listed as the Latesha vaccine on 4/8/21, and documented the first administration of the COVID-19 Pfizer vaccine today.   Jackie Moodyelor, RN  "

## 2021-04-26 ENCOUNTER — OFFICE VISIT (OUTPATIENT)
Dept: FAMILY MEDICINE | Facility: CLINIC | Age: 51
End: 2021-04-26
Payer: COMMERCIAL

## 2021-04-26 VITALS
RESPIRATION RATE: 16 BRPM | BODY MASS INDEX: 29.76 KG/M2 | DIASTOLIC BLOOD PRESSURE: 84 MMHG | OXYGEN SATURATION: 99 % | WEIGHT: 180.8 LBS | SYSTOLIC BLOOD PRESSURE: 130 MMHG | TEMPERATURE: 97.3 F | HEART RATE: 80 BPM

## 2021-04-26 DIAGNOSIS — G89.29 CHRONIC LEFT FLANK PAIN: Primary | ICD-10-CM

## 2021-04-26 DIAGNOSIS — R10.9 CHRONIC LEFT FLANK PAIN: Primary | ICD-10-CM

## 2021-04-26 LAB
ALBUMIN SERPL-MCNC: 3.8 G/DL (ref 3.4–5)
ALBUMIN UR-MCNC: NEGATIVE MG/DL
ALP SERPL-CCNC: 107 U/L (ref 40–150)
ALT SERPL W P-5'-P-CCNC: 24 U/L (ref 0–50)
ANION GAP SERPL CALCULATED.3IONS-SCNC: 5 MMOL/L (ref 3–14)
APPEARANCE UR: CLEAR
AST SERPL W P-5'-P-CCNC: 17 U/L (ref 0–45)
BILIRUB SERPL-MCNC: 0.2 MG/DL (ref 0.2–1.3)
BILIRUB UR QL STRIP: NEGATIVE
BUN SERPL-MCNC: 17 MG/DL (ref 7–30)
CALCIUM SERPL-MCNC: 9.4 MG/DL (ref 8.5–10.1)
CHLORIDE SERPL-SCNC: 109 MMOL/L (ref 94–109)
CO2 SERPL-SCNC: 28 MMOL/L (ref 20–32)
COLOR UR AUTO: YELLOW
CREAT SERPL-MCNC: 0.71 MG/DL (ref 0.52–1.04)
ERYTHROCYTE [DISTWIDTH] IN BLOOD BY AUTOMATED COUNT: 13.3 % (ref 10–15)
GFR SERPL CREATININE-BSD FRML MDRD: >90 ML/MIN/{1.73_M2}
GLUCOSE SERPL-MCNC: 115 MG/DL (ref 70–99)
GLUCOSE UR STRIP-MCNC: NEGATIVE MG/DL
HCT VFR BLD AUTO: 41.6 % (ref 35–47)
HGB BLD-MCNC: 13.6 G/DL (ref 11.7–15.7)
HGB UR QL STRIP: NEGATIVE
KETONES UR STRIP-MCNC: NEGATIVE MG/DL
LEUKOCYTE ESTERASE UR QL STRIP: NEGATIVE
LIPASE SERPL-CCNC: 94 U/L (ref 73–393)
MCH RBC QN AUTO: 28.4 PG (ref 26.5–33)
MCHC RBC AUTO-ENTMCNC: 32.7 G/DL (ref 31.5–36.5)
MCV RBC AUTO: 87 FL (ref 78–100)
NITRATE UR QL: NEGATIVE
PH UR STRIP: 6 PH (ref 5–7)
PLATELET # BLD AUTO: 193 10E9/L (ref 150–450)
POTASSIUM SERPL-SCNC: 4.7 MMOL/L (ref 3.4–5.3)
PROT SERPL-MCNC: 7.2 G/DL (ref 6.8–8.8)
RBC # BLD AUTO: 4.79 10E12/L (ref 3.8–5.2)
SODIUM SERPL-SCNC: 142 MMOL/L (ref 133–144)
SOURCE: NORMAL
SP GR UR STRIP: 1.02 (ref 1–1.03)
UROBILINOGEN UR STRIP-ACNC: 0.2 EU/DL (ref 0.2–1)
WBC # BLD AUTO: 2.8 10E9/L (ref 4–11)

## 2021-04-26 PROCEDURE — 85027 COMPLETE CBC AUTOMATED: CPT | Performed by: PHYSICIAN ASSISTANT

## 2021-04-26 PROCEDURE — 83690 ASSAY OF LIPASE: CPT | Performed by: PHYSICIAN ASSISTANT

## 2021-04-26 PROCEDURE — 36415 COLL VENOUS BLD VENIPUNCTURE: CPT | Performed by: PHYSICIAN ASSISTANT

## 2021-04-26 PROCEDURE — 80053 COMPREHEN METABOLIC PANEL: CPT | Performed by: PHYSICIAN ASSISTANT

## 2021-04-26 PROCEDURE — 81003 URINALYSIS AUTO W/O SCOPE: CPT | Performed by: PHYSICIAN ASSISTANT

## 2021-04-26 PROCEDURE — 99214 OFFICE O/P EST MOD 30 MIN: CPT | Performed by: PHYSICIAN ASSISTANT

## 2021-04-26 RX ORDER — CYCLOBENZAPRINE HCL 10 MG
5-10 TABLET ORAL 3 TIMES DAILY PRN
Qty: 25 TABLET | Refills: 0 | Status: SHIPPED | OUTPATIENT
Start: 2021-04-26 | End: 2022-01-12

## 2021-04-26 NOTE — PROGRESS NOTES
Assessment & Plan   Problem List Items Addressed This Visit     None      Visit Diagnoses     Chronic left flank pain    -  Primary    Relevant Orders    *MA Screening Digital Bilateral    [facility] REFERRAL    *UA reflex to Microscopic and Culture ([place] [facility] (except [place] and [place]) (Completed)    CBC with platelets (Completed)    Comprehensive metabolic panel (BMP + Alb, Alk Phos, ALT, AST, Total. Bili, TP) (Completed)    Lipase (Completed)    MR Lumbar Spine w/o Contrast         I do not believe a fracture to be the source of this patient's pain as there was no preceding trauma.  Based on this, no imaging of the spine was done. Previous CT of abd/pelv showed no worrisome MSK findings.   I do not believe the pain is caused by an epidural abscess as the patient denies hx of IV drug use and does not have fevers/chills and no recent procedures.    I do not believe this patient's pain is from an infiltrative vertebral tumor as the patient does not have weight loss or night sweats and no known history of cancer.    I do not believe this patient's pain represents a rupture AAA.  There is no palpable, pulsatile mass on exam and patient does not have any ABD pain.    CBC, comp panel, lipase, and UA were not concerning, though she has mild leukopenia.    Based on history and exam, the most likely etiology of this patient's back pain is likely musculoskeletal.  Patient does not have new weakness or cauda equina syndrome.  Patient has no saddle anesthesia, bowel or bladder incontinence. Will send home with otc analgesics,  muscle relaxant for breakthrough pain/spasm, rice/heat, and physical therapy referral. I will also order a lumbar MRI should she not be improving in one month.    Complete history and physical exam as below. AF with normal VS.    DDx and Dx discussed with and explained to the pt to their satisfaction.  All questions were answered at this time. Pt expressed understanding  of and agreement with this dx, tx, and plan. No further workup warranted and standard medication warnings given. I have given the patient a list of pertinent indications for re-evaluation. Will go to the Emergency Department if symptoms worsen or new concerning symptoms arise. Patient left in no apparent distress.     34 minutes spent on the date of the encounter doing chart review, history and exam, documentation and further activities per the note    See Patient Instructions    Return in about 1 month (around 5/26/2021) for a recheck if not improving with [facility], or go to an ER anytime if worsening/changing..    ENRIQUETA Ceja  [facility] YANIRA Paniagua is a 51 year old who presents for the following health issues:    HPI     Back Pain  Onset/Duration: x4-5 months   Description:   Initial evaluation  1/22/21  Location of pain: middle of back left- nodule/mass noticed in this area  Character of pain: dull, ache, sharp- pain changes  Pain radiation: into the spine   New numbness or weakness in legs, not attributed to pain: no   Intensity: Currently 6/10, At its worst 8/10  Progression of Symptoms: worsening  History:   Specific cause: none  Pain interferes with job:  no   History of back problems: seen previously for this 1/22/21  Any previous MRI or X-rays: CT 1/2021  Sees a specialist for back pain: No  Alleviating factors:   Improved by: none    Precipitating factors:  Worsened by: Standing, laying   Therapies tried and outcome: heat, tylenol .  Was prescribed flexeril at 1/22/21 but she never picked up medication    Accompanying Signs & Symptoms:  Risk of Fracture: None  Risk of Cauda Equina: None  Risk of Infection: None  Risk of Cancer: None  Risk of Ankylosing Spondylitis: Onset at age <35, male, AND morning back stiffness no    Review of Systems   Constitutional, HEENT, cardiovascular, pulmonary, gi and gu systems are negative, except as otherwise noted.      Objective     /84   Pulse 80   Temp 97.3  F (36.3  C) (Tympanic)   Resp 16   Wt 82 kg (180 lb 12.8 oz)   SpO2 99%   Breastfeeding No   BMI 29.76 kg/m    Body mass index is 29.76 kg/m .  Physical Exam  Vitals signs and nursing note reviewed.   Constitutional:       General: She is not in acute distress.     Appearance: She is not ill-appearing or diaphoretic.   HENT:      Head: Normocephalic and atraumatic.      Mouth/Throat:      Mouth: Mucous membranes are moist.   Eyes:      Conjunctiva/sclera: Conjunctivae normal.   Cardiovascular:      Rate and Rhythm: Normal rate and regular rhythm.      Heart sounds: Normal heart sounds. No murmur. No friction rub. No gallop.    Pulmonary:      Effort: Pulmonary effort is normal. No respiratory distress.      Breath sounds: Normal breath sounds. No stridor. No wheezing, rhonchi or rales.   Abdominal:      General: Bowel sounds are normal. There is no distension.      Palpations: Abdomen is soft. There is no mass.      Tenderness: There is no abdominal tenderness. There is left CVA tenderness. There is no right CVA tenderness, guarding or rebound.      Hernia: No hernia is present.   Musculoskeletal:      Comments: No midline spinal tenderness. No overlying signs of trauma or infection. Mild left lumbar paraspinal muscle tenderness without overlying signs of trauma or infection.   Skin:     General: Skin is warm and dry.   Neurological:      General: No focal deficit present.      Mental Status: She is alert. Mental status is at baseline.      Comments: Able to toe lift, heel walk and knee bend.   Psychiatric:         Mood and Affect: Mood normal.         Behavior: Behavior normal.        Results for orders placed or performed in visit on 04/26/21   *UA reflex to Microscopic and Culture ([facility] (except [place] and [place])     Status: None    Specimen: Midstream Urine   Result Value Ref Range    Color Urine Yellow     Appearance Urine Clear     Glucose  Urine Negative NEG^Negative mg/dL    Bilirubin Urine Negative NEG^Negative    Ketones Urine Negative NEG^Negative mg/dL    Specific Gravity Urine 1.025 1.003 - 1.035    Blood Urine Negative NEG^Negative    pH Urine 6.0 5.0 - 7.0 pH    Protein Albumin Urine Negative NEG^Negative mg/dL    Urobilinogen Urine 0.2 0.2 - 1.0 EU/dL    Nitrite Urine Negative NEG^Negative    Leukocyte Esterase Urine Negative NEG^Negative    Source Midstream Urine    CBC with platelets     Status: Abnormal   Result Value Ref Range    WBC 2.8 (L) 4.0 - 11.0 10e9/L    RBC Count 4.79 3.8 - 5.2 10e12/L    Hemoglobin 13.6 11.7 - 15.7 g/dL    Hematocrit 41.6 35.0 - 47.0 %    MCV 87 78 - 100 fl    MCH 28.4 26.5 - 33.0 pg    MCHC 32.7 31.5 - 36.5 g/dL    RDW 13.3 10.0 - 15.0 %    Platelet Count 193 150 - 450 10e9/L   Comprehensive metabolic panel (BMP + Alb, Alk Phos, ALT, AST, Total. Bili, TP)     Status: Abnormal   Result Value Ref Range    Sodium 142 133 - 144 mmol/L    Potassium 4.7 3.4 - 5.3 mmol/L    Chloride 109 94 - 109 mmol/L    Carbon Dioxide 28 20 - 32 mmol/L    Anion Gap 5 3 - 14 mmol/L    Glucose 115 (H) 70 - 99 mg/dL    Urea Nitrogen 17 7 - 30 mg/dL    Creatinine 0.71 0.52 - 1.04 mg/dL    GFR Estimate >90 >60 mL/min/[1.73_m2]    GFR Estimate If Black >90 >60 mL/min/[1.73_m2]    Calcium 9.4 8.5 - 10.1 mg/dL    Bilirubin Total 0.2 0.2 - 1.3 mg/dL    Albumin 3.8 3.4 - 5.0 g/dL    Protein Total 7.2 6.8 - 8.8 g/dL    Alkaline Phosphatase 107 40 - 150 U/L    ALT 24 0 - 50 U/L    AST 17 0 - 45 U/L   Lipase     Status: None   Result Value Ref Range    Lipase 94 73 - 393 U/L

## 2021-04-26 NOTE — PATIENT INSTRUCTIONS
Rachel Paniagua,    Thank you for allowing [facility] to manage your care.    I am unsure of the cause of your symptoms, but your exam is reassuring. We will see what our workup shows.     If you develop worsening/changing symptoms at any time, please go to the emergency department for evaluation.    Get a blood pressure cuff for use at home. If your blood pressure is greater than or equal to 140/90mm Hg more than half of the time, please schedule an appointment with us for a recheck.    I ordered some lab work, please go to the laboratory to get your studies.    I sent your prescriptions to your pharmacy.    For your pain, please use Ibuprofen 400mg four times daily with food. Between ibuprofen doses, you may use Tylenol 650mg.     Max acetaminophen (Tylenol) 4,000mg/24 hours  Max ibuprofen 3,200mg/24 hours    For severe pain not controlled by over the counter medications, please use cyclobenzaprine as prescribed. Do not use this medication while driving, operating machinery, with other sedating medications, or while drinking alcohol as it will make you drowsy.    I ordered an MRI, please call diagnostic imaging [phone] to schedule your test in one month if not improving. Also get your screening mammogram schedule by calling this number.    I made a physical therpay referral, they will be calling in approximately 1 week to set up your appointment.  If you do not hear from them, please call the specialty number on your after visit summary.     Please allow 1-2 business days for our office to contact you in regards to your laboratory/radiological studies.  If not done so, I encourage you to login into [email or web address] ([email or web address] to review your results as well.     If you have any questions or concerns, please feel free to call us at [phone]    Sincerely,    Jimmie Tang PA-C    Did you know?      You can schedule a video visit for follow-up appointments as well as future  appointments for certain conditions.  Please see the below link.     [email or web address]    If you have not already done so,  I encourage you to sign up for [employer] ([email or web address]).  This will allow you to review your results, securely communicate with a provider, and schedule virtual visits as well.    We are now scheduling all people age 16 and older for COVID-19 vaccines (patients age 16-17 can only  the Pfizer vaccine).     To schedule your appointment please log in to [employer] using this link to see when and where we have openings. Appointments open up throughout the week, check back for additional openings.     If there are no appointments left, you will be unable to schedule. If you have technical difficulty using [employer], call [phone] for assistance.  If you would like to be added to our standby list, do that on our website: here.    The Pfizer/BioNTech vaccine is offered at the following sites, please return in 21 days for your second dose::    [facility] (former clinic, now serving as a vaccination-only site)  The Moderna vaccine is offered at the following sites: please follow-up in 28 days for your second dose    [facility]  Vaccine offered at the following sites vary depending on availability. You will be notified after you schedule on [employer] what the vaccine is that day or you can call to know what vaccine is being given that day prior to scheduling.  Pfizer vaccine will need a second dose in 21 days and Moderna in 28 days.    [facility] (former clinic, now serving as a vaccination-only site)    [facility]  [facility]   This information is also available on our website [email or web address]. Check this website to stay up to date on COVID-19 vaccination information      Patient Education     Flank Pain with Uncertain Cause    The flank is the area between your upper belly (abdomen) and your back. Pain there is often caused by a problem with your kidneys. It might be a kidney infection or a kidney stone. Other causes of flank pain include spinal arthritis, a pinched nerve from a back injury, a rib injury, a bruise, a back muscle strain, inflammation, or spasm.  The cause of your flank pain is not certain. You may need other tests.  Home care  Follow these tips when caring for yourself at home:    You may use acetaminophen or ibuprofen to control pain, unless your healthcare provider prescribed another medicine. If you have chronic liver or kidney disease, talk with your provider before taking these medicines. Also talk with your provider first if you ve ever had a stomach ulcer or digestive bleeding.    If the pain is coming from your muscles, you may get relief with ice or heat. During the first 2 days after the injury, put an ice pack on the painful area for 20 minutes every 2 to 4 hours. This will reduce swelling and pain. A hot shower, hot bath, or heating pad works well for a muscle spasm. You can start with ice, then switch to heat after 2 days. You might find that alternating ice and heat works well. Use the method that feels the best to you.  Follow-up care  Follow up with your healthcare provider if your symptoms don t get better over the next few days.  When to seek medical advice  Call your healthcare provider right away if any of these happen:    Repeated vomiting    Fever of 100.4 F (38 C) or higher, or as directed by your healthcare provider    Flank pain that gets worse    Pain that spreads to the front of your belly (abdomen)    Dizziness, weakness,  or fainting    Blood in your urine    Burning feeling when you urinate or the need to urinate often    Pain in one of your legs that gets worse    Numbness or weakness in a leg  [employer] last reviewed this educational content on 10/1/2019    4096-0649 [employer]. All rights reserved. This information is not intended as a substitute for professional medical care. Always follow your healthcare professional's instructions.

## 2021-05-11 ENCOUNTER — TELEPHONE (OUTPATIENT)
Dept: FAMILY MEDICINE | Facility: CLINIC | Age: 51
End: 2021-05-11

## 2021-05-11 NOTE — TELEPHONE ENCOUNTER
Reason for call:  Symptom   Symptom or request: Sore throat     Duration (how long have symptoms been present): One week   Have you been treated for this before? Yes    Additional comments: Was given allergy medication yesterday from the [facility] clinic     Phone number to reach patient:  Cell number on file:    Telephone Information:   Mobile [phone]       Best Time:  any    Can we leave a detailed message on this number?  YES    Travel screening: Not Applicable

## 2021-05-11 NOTE — TELEPHONE ENCOUNTER
Left message on voice mail for patient to call clinic.   [phone]  Sarah Maynard RN  [facility]

## 2021-05-13 ENCOUNTER — VIRTUAL VISIT (OUTPATIENT)
Dept: FAMILY MEDICINE | Facility: CLINIC | Age: 51
End: 2021-05-13
Payer: COMMERCIAL

## 2021-05-13 DIAGNOSIS — R07.0 THROAT PAIN: Primary | ICD-10-CM

## 2021-05-13 DIAGNOSIS — Z20.822 SUSPECTED COVID-19 VIRUS INFECTION: ICD-10-CM

## 2021-05-13 PROCEDURE — 99213 OFFICE O/P EST LOW 20 MIN: CPT | Mod: 95 | Performed by: PHYSICIAN ASSISTANT

## 2021-05-13 RX ORDER — FLUTICASONE PROPIONATE 50 MCG
1 SPRAY, SUSPENSION (ML) NASAL DAILY PRN
Qty: 11.1 ML | Refills: 0 | Status: SHIPPED | OUTPATIENT
Start: 2021-05-13 | End: 2022-01-12

## 2021-05-13 NOTE — TELEPHONE ENCOUNTER
Left message on voice mail for patient to call clinic.   [phone]  Sarah Maynard RN  [facility]

## 2021-05-13 NOTE — PATIENT INSTRUCTIONS
Rachel Paniagua,    Thank you for allowing [facility] to manage your care.    I am unsure of the cause of your symptoms, but your exam is reassuring. We will see what our workup shows. This could a viral respiratory infection.    If you develop worsening/changing symptoms at any time, please call 911 or go to the emergency department for evaluation.    Drink 8-10 glasses of fluid daily to stay well-hydrated.    For your pain, please use Ibuprofen 400mg four times daily with food. Between ibuprofen doses, you may use Tylenol 650mg.     Max acetaminophen (Tylenol) 4,000mg/24 hours  Max ibuprofen 3,200mg/24 hours    I sent your prescriptions to your pharmacy.    Please allow 1-2 business days for our office to contact you in regards to your laboratory/radiological studies.  If not done so, I encourage you to login into [employer] ([email or web address] to review your results as well.     If you have any questions or concerns, please feel free to call us at [phone]    Sincerely,    Jimmie Tang PA-C    Did you know?      You can schedule a video visit for follow-up appointments as well as future appointments for certain conditions.  Please see the below link.     [email or web address]    If you have not already done so,  I encourage you to sign up for [employer] ([email or web address]).  This will allow you to review your results, securely communicate with a provider, and schedule virtual visits as well.      Patient Education     Self-Care for Sore Throats   Sore throats happen for many reasons, such as colds, allergies, cigarette smoke, air pollution, and infections caused by viruses or bacteria. In any case, your throat becomes red and sore. Your goal for self-care is to reduce your discomfort while giving your throat a chance to heal.  Moisten and soothe your throat  Tips include the following:    Try a sip of water first thing after waking  up.    Keep your throat moist by drinking 6 or more glasses of clear liquids every day.    Run a cool-air humidifier in your room overnight.    Stay away from cigarette smoke.     Check the air quality index,if air pollution gives you a sore throat. On high pollution days, try to limit outdoor time.    Suck on throat lozenges, cough drops, hard candy, ice chips, or frozen fruit-juice bars. Use the sugar-free versions if your diet or medical condition requires them.  Gargle to ease irritation  Gargling every hour or 2 can ease irritation. Try gargling with 1 of these solutions:    1/4 teaspoon of salt in 1/2 cup of warm water    An over-the-counter anesthetic gargle  Use medicine for more relief  Over-the-counter medicine can reduce sore throat symptoms. Ask your pharmacist if you have questions about which medicine to use. To prevent possible medicine interactions, let the pharmacist know what medicines you take. To decrease symptoms:    Ease pain with anesthetic sprays. Aspirin or an aspirin substitute also helps. Remember, never give aspirin to anyone 18 or younger. Don't take aspirin if you are already taking blood thinners.     For sore throats caused by allergies, try antihistamines to block the allergic reaction.  Unless a sore throat is caused by a bacterial infection, antibiotics won t help you.  Prevent future sore throats  Prevention tips include:    Stop smoking or reduce contact with secondhand smoke. Smoke irritates the tender throat lining.    Limit contact with pets and with allergy-causing substances, such as pollen and mold.    Wash your hands often when you re around someone with a sore throat or cold. This will keep viruses or bacteria from spreading.    Limit outdoor time when air pollution is bad.    Don t strain your vocal cords.  When to call your healthcare provider  Contact your healthcare provider if you have:    Fever of 100.4 F (38.0 C) or higher, or as directed by your healthcare  "provider    White spots on the throat    Great Trouble swallowing    A skin rash    Recent exposure to someone else with strep bacteria    Severe hoarseness and swollen glands in the neck or jaw  Call 911  Call 911 if any of the following occur:    Trouble breathing or catching your breath    Drooling and problems swallowing    Wheezing    Unable to talk    Feeling dizzy or faint    Feeling of doom  Pamela last reviewed this educational content on 9/1/2019 2000-2021 [employer]. All rights reserved. This information is not intended as a substitute for professional medical care. Always follow your healthcare professional's instructions.           Patient Education   After Your COVID-19 (Coronavirus) Test  You have been tested for COVID-19 (coronavirus).   If you'll have surgery in the next few days, we'll let you know ahead of time if you have the virus. Please call your surgeon's office with any questions.  For all other patients: Results are usually available in [employer] within 2 to 3 days.   If you do not have a [employer] account, you'll get a letter in the mail in about 7 to 10 days.   [employer] is often the fastest way to get test results. Please sign up if you do not already have a [employer] account. See the handout Getting COVID-19 Test Results in [employer] for help.  What if my test result is positive?  If your test is positive and you have not viewed your result in [employer], you'll get a phone call with your result. (A positive test means that you have the virus.)     Follow the tips under \"How do I self-isolate?\" below for 10 days (20 days if you have a weak immune system).    You don't need to be retested for COVID-19 before going back to school or work. As long as you're fever-free and feeling better, you can go back to school, work and other activities after waiting the 10 or 20 days.  What if I have questions after I get my results?  If you have questions about your results, please visit our " "testing website at [email or web address].   After 7 to 10 days, if you have not gotten your results:     Call [phone] ([phone]) and ask to speak with our COVID-19 results team.    If you're being treated at an infusion center: Call your infusion center directly.  What are the symptoms of COVID-19?  Cough, fever and trouble breathing are the most common signs of COVID-19.  Other symptoms can include new headaches, new muscle or body aches, new and unexplained fatigue (feeling very tired), chills, sore throat, congestion (stuffy or runny nose), diarrhea (loose poop), loss of taste or smell, belly pain, and nausea or vomiting (feeling sick to your stomach or throwing up).  You may already have symptoms of COVID-19, or they may show up later.  What should I do if I have symptoms?  If you're having surgery: Call your surgeon's office.  For all other patients: Stay home and away from others (self-isolate) until ...    You've had no fever--and no medicine that reduces fever--for 1 full day (24 hours), AND    Other symptoms have gotten better. For example, your cough or breathing has improved, AND    At least 10 days have passed since your symptoms first started.  How do I self-isolate?    Stay in your own room, even for meals. Use your own bathroom if you can.    Stay away from others in your home. No hugging, kissing or shaking hands. No visitors.    Don't go to work, school or anywhere else.    Clean \"high touch\" surfaces often (doorknobs, counters, handles). Use household cleaning spray or wipes. You'll find a full list of  on the EPA website: www.epa.gov/pesticide-registration/list-n-disinfectants-use-against-sars-cov-2.    Cover your mouth and nose with a mask or other face covering to avoid spreading germs.    Wash your hands and face often. Use soap and water.    Caregivers in these groups are at risk for severe illness due to COVID-19:  ? People 65 years and " older  ? People who live in a nursing home or long-term care facility  ? People with chronic disease (lung, heart, cancer, diabetes, kidney, liver, immunologic)  ? People who have a weakened immune system, including those who:    Are in cancer treatment    Take medicine that weakens the immune system, such as corticosteroids    Had a bone marrow or organ transplant    Have an immune deficiency    Have poorly controlled HIV or AIDS    Are obese (body mass index of 40 or higher)    Smoke regularly    Caregivers should wear gloves while washing dishes, handling laundry and cleaning bedrooms and bathrooms.    Use caution when washing and drying laundry: Don't shake dirty laundry and use the warmest water setting that you can.    For more tips on managing your health at home, go to www.cdc.gov/coronavirus/2019-ncov/downloads/10Things.pdf.  How can I take care of myself at home?  1. Get lots of rest. Drink extra fluids (unless a doctor has told you not to).  2. Take Tylenol (acetaminophen) for fever or pain. If you have liver or kidney problems, ask your family doctor if it's OK to take Tylenol.   Adults can take either:  ? 650 mg (two 325 mg pills) every 4 to 6 hours, or   ? 1,000 mg (two 500 mg pills) every 8 hours as needed.  ? Note: Don't take more than 3,000 mg in one day. Acetaminophen is found in many medicines (both prescribed and over-the-counter medicines). Read all labels to be sure you don't take too much.   For children, check the Tylenol bottle for the right dose. The dose is based on the child's age or weight.  3. If you have other health problems (like cancer, heart failure, an organ transplant or severe kidney disease): Call your specialty clinic if you don't feel better in the next 2 days.  4. Know when to call 911. Emergency warning signs include:  ? Trouble breathing or shortness of breath  ? Chest pain or pressure that doesn't go away  ? Feeling confused like you haven't felt before, or not being able  to wake up  ? Bluish-colored lips or face  5. If your doctor prescribed a blood thinner medicine: Follow their instructions.  Where can I get more information?    [facility] - About COVID-19:   [email or web address]ac[employer] by [employer] in [email or web address]).[email or web address]    CDC - If You're Sick: cdc.gov/coronavirus/2019-ncov/about/steps-when-sick.html    CDC - Ending Home Isolation: www.cdc.gov/coronavirus/2019-ncov/hcp/disposition-in-home-patients.html    [facility] - Caring for Someone: www.cdc.gov/coronavirus/2019-ncov/if-you-are-sick/care-for-someone.html    [facility] - Interim Guidance for Hospital Discharge to Home: www.[facility].[place]/diseases/coronavirus/hcp/hospdischarge.pdf    [facility] clinical trials (COVID-19 research studies): clinicalaffairs.[facility]-clinical-trials    Below are the COVID-19 hotlines at the [place] Department of Health ([facility]). Interpreters are available.  ? For health questions: Call 660-345-3634 or 1-263.711.2280 (7 a.m. to 7 p.m.)  ? For questions about schools and childcare: Call 445-849-1028 or 1-102.618.2275 (7 a.m. to 7 p.m.)    For informational purposes only. Not to replace the advice of your health care provider. Clinically reviewed by Infection Prevention and the [facility] COVID-19 Clinical Team. Copyright   2020 [employer]. All rights reserved. [employer] 440382 - Rev 11/11/20.

## 2021-05-13 NOTE — PROGRESS NOTES
Arcelia is a 51 year old who is being evaluated via a billable video visit.      How would you like to obtain your AVS? MyChart  If the video visit is dropped, the invitation should be resent by: Text to cell phone: [phone]  Will anyone else be joining your video visit? No     Video Start Time: 3:17 PM    Assessment & Plan   Problem List Items Addressed This Visit     None      Visit Diagnoses     Throat pain    -  Primary    Relevant Medications    Benzocaine-Menthol (CEPACOL SORE THROAT) 15-2.6 MG LOZG    fluticasone (FLONASE) 50 MCG/ACT nasal spray    Other Relevant Orders    Symptomatic COVID-19 Virus (Coronavirus) by PCR    Streptococcus A Rapid Scr w Reflx to PCR    Suspected COVID-19 virus infection        Relevant Orders    Symptomatic COVID-19 Virus (Coronavirus) by PCR         Impression is likely viral URI including COVID-19. Will order COVID-19 PCR, though she has a test pending, had COVID ~5 months ago and has received the first dose of the Pfizer COVID vaccination, so this is less likely. Will also retest for strep. Appears well and non-toxic and I have low suspicion for impending airway obstruction or respiratory distress.  She will push p.o. fluids, use over-the-counter meds for symptoms, prescriptions as above and follow-up with us/primary in 2 weeks if not improving or urgent care/the ER if symptoms worsen/change at any time.    DDx and Dx discussed with and explained to the pt to their satisfaction.  All questions were answered at this time. Pt expressed understanding of and agreement with this dx, tx, and plan. No further workup warranted and standard medication warnings given. I have given the patient a list of pertinent indications for re-evaluation. Will go to the Emergency Department if symptoms worsen or new concerning symptoms arise. Patient left the call in no apparent distress.     26 minutes spent on the date of the encounter doing chart review, history and exam, documentation and  further activities per the note    See Patient Instructions    Return in about 2 weeks (around 5/27/2021) for a recheck of your symptoms if not improving, or call 911/go to an ER anytime if worsening.    ENRIQUETA Ceja  [facility] YANIRA Paniagua is a 51 year old who presents for the following health issues:    HPI     Acute Illness  Acute illness concerns: S.T and bilateral ear pain x 1 week  Onset/Duration: 1 week  Symptoms:  Fever: no  Chills/Sweats: no  Headache (location?): no  Sinus Pressure: no  Conjunctivitis:  no  Ear Pain: YES: bilateral  Rhinorrhea: no  Congestion: no  Sore Throat: YES  Cough: no  Wheeze: no  Decreased Appetite: no  Nausea: no  Vomiting: no  Diarrhea: no  Dysuria/Freq.: no  Dysuria or Hematuria: no  Fatigue/Achiness: YES  Sick/Strep Exposure: no  Therapies tried and outcome: None      Review of Systems   Constitutional, HEENT, cardiovascular, pulmonary, gi and gu systems are negative, except as otherwise noted.      Objective       Physical Exam  Vitals signs and nursing note reviewed.   Constitutional:       General: She is not in acute distress.     Appearance: Normal appearance. She is not diaphoretic.   HENT:      Head: Normocephalic and atraumatic.      Nose: Nose normal.      Mouth/Throat:      Mouth: Mucous membranes are moist.      Pharynx: Oropharynx is clear.      Comments: No trismus or tripod breathing.  Eyes:      Conjunctiva/sclera: Conjunctivae normal.   Neck:      Musculoskeletal: Normal range of motion.   Pulmonary:      Effort: Pulmonary effort is normal. No respiratory distress.   Skin:     General: Skin is dry.      Coloration: Skin is not jaundiced or pale.   Neurological:      General: No focal deficit present.      Mental Status: She is alert. Mental status is at baseline.   Psychiatric:         Mood and Affect: Mood normal.         Behavior: Behavior normal.        GENERAL: Healthy, alert and no distress  EYES: Eyes grossly normal  to inspection.  No discharge or erythema, or obvious scleral/conjunctival abnormalities.  RESP: No audible wheeze, cough, or visible cyanosis.  No visible retractions or increased work of breathing.    SKIN: Visible skin clear. No significant rash, abnormal pigmentation or lesions.  NEURO: Cranial nerves grossly intact.  Mentation and speech appropriate for age.  PSYCH: Mentation appears normal, affect normal/bright, judgement and insight intact, normal speech and appearance well-groomed.    Strep and COVID-19 PCR test pending.    Video-Visit Details    Type of service:  Video Visit    Video End Time: 3:25 PM    Originating Location (pt. Location): Home    Distant Location (provider location):  [facility] [employer]     Platform used for Video Visit: [employer]

## 2021-05-14 ENCOUNTER — IMMUNIZATION (OUTPATIENT)
Dept: NURSING | Facility: CLINIC | Age: 51
End: 2021-05-14
Attending: INTERNAL MEDICINE
Payer: COMMERCIAL

## 2021-05-14 PROCEDURE — 0002A PR COVID VAC PFIZER DIL RECON 30 MCG/0.3 ML IM: CPT

## 2021-05-14 PROCEDURE — 91300 PR COVID VAC PFIZER DIL RECON 30 MCG/0.3 ML IM: CPT

## 2021-05-14 NOTE — TELEPHONE ENCOUNTER
Patient was seen on 5/11/21 at [facility].    Margret BSN-RN  Triage Nurse  [facility]: [facility]

## 2021-05-21 ENCOUNTER — TELEPHONE (OUTPATIENT)
Dept: FAMILY MEDICINE | Facility: CLINIC | Age: 51
End: 2021-05-21

## 2021-05-21 NOTE — TELEPHONE ENCOUNTER
Patient has been experiencing throat and upper shoulder pain. Patient requesting if US can be done instead of MRI. Please advise and call patient.

## 2021-05-21 NOTE — TELEPHONE ENCOUNTER
Patient notified and voiced understanding and agreement.  appt scheduled for Monday 5/24/21, and she knows to go to ER/UC if any symptoms change or worsen.  Sarah Maynard RN  [facility]

## 2021-05-21 NOTE — TELEPHONE ENCOUNTER
"See message below.        I  called Arcelia to find out why she wanted an US instead of an MRI. She said that  her left sided back pain has improved with medication and she would like to avoid the Radiation. She states that she's had numerous exposures to radiation in the past year and would like to avoid if possible.     Patient is not having shoulder pain (stated in initial phone message).     She continues to have throat pain (has not changed in the past 3 weeks).    She reports that she has developed some mild upper chest pain over the past 3 days. She said that the pain comes and goes. She has taken Tylenol which has helped. She rates this pain a \"3\" on a scale of 0-10. She said it feels like a muscle strain.     No breathing concerns. No fever.  Arcelia tells me that she has good energy, is eating and drinking as usual. She is sleeping well.     She wonders if an US can be ordered first and then only an MRI if necessary?    Jazmyne ALLENN  [facility]      "

## 2021-05-21 NOTE — TELEPHONE ENCOUNTER
Please update patient. MRI has no radiation. I would only pursue the MRI if her back pain is still bothering her. As for her chest discomfort and throat pain, I would be happy to see her for this if she would like and if symptoms worsen, she is to go to the ER/UC.    Thanks.

## 2021-05-21 NOTE — PROGRESS NOTES
Assessment & Plan   Problem List Items Addressed This Visit     None      Visit Diagnoses     Throat discomfort    -  Primary    Relevant Medications    hydrOXYzine (VISTARIL) 25 MG capsule    famotidine (PEPCID) 20 MG tablet    Other Relevant Orders    XR Neck Soft Tissue    TSH with free T4 reflex (Completed)    CBC with platelets and differential (Completed)    OTOLARYNGOLOGY REFERRAL    Streptococcus A Rapid Scr w Reflx to PCR (Completed)    Group A Streptococcus PCR Throat Swab (Completed)    Anxiety        Relevant Medications    hydrOXYzine (VISTARIL) 25 MG capsule    Other Relevant Orders    TSH with free T4 reflex (Completed)    MENTAL HEALTH REFERRAL  - Adult; Outpatient Treatment; Individual/Couples/Family/Group Therapy/Health Psychology; [facility] [place] [facility] [phone]; We will contact you to schedule the appointment or please call with any questions         Arcelia Liz is a 51 year old female who presents today for evaluation of throat and ear discomfort.        Rapid strep negative.  PCR sent, will call patient if positive.    Unlikely mono without cervical adenopathy and no exudate/edema.    Not consistent with influenza/COVID without additional symptoms or fever.    No evidence on exam for peritonsillar or retropharyngeal abscess.    No drooling or muffled voice to suggest epiglottitis. X-ray reassuring.    Other labs pending    Impression is throat discomfort from unknown but likely non-emergent etiology. Could be globus from anxiety. Has a lot of anxiety regarding any physical symptoms since the passing of her spouse from cancer a few years ago. Referred to mental health, though she has this set up with an outside clinic for 4-6 weeks from now. Will attempt treatment with fluticasone spray and famotidine to cover for post nasal drip and GERD as causes respectively. Will also trial hydroxyzine for prn anxiety. Not interested in selective serotonin reuptake inhibitor/SNRI at this  time. Follow up with ENT and  as needed. Referrals placed. Follow up with primary in one month as needed.  Appears well and non-toxic and I have low suspicion for impending airway obstruction or respiratory distress.    Complete history and physical exam as below. AF with normal VS except for mild tachycardia and elevated bp, which they will monitor at home and contact us if >140/90mmHg greater than 50% of the time.    DDx and Dx discussed with and explained to the pt to their satisfaction.  All questions were answered at this time. Pt expressed understanding of and agreement with this dx, tx, and plan. No further workup warranted and standard medication warnings given. I have given the patient a list of pertinent indications for re-evaluation. Will go to the Emergency Department if symptoms worsen or new concerning symptoms arise. Patient left in no apparent distress.     55 minutes spent on the date of the encounter doing chart review, history and exam, documentation and further activities per the note    See Patient Instructions    Return in about 1 month (around 6/24/2021) for a recheck of your symptoms if not improving, or call 911/go to an ER anytime if worsening.    ENRIQUETA Ceja  [facility] YANIRA Paniagua is a 51 year old who presents for the following health issues:    HPI   3 weeks of throat discomfort. Does not interfere or get worse with swallowing. Present most of the time. Also has pain in both ears. No sob or ear drainage, hearing changes, fevers, chillls, N/V, bowel/bladder changes, bruising or other symptoms.    Feels anxious when she has physical symptoms and fears the worst. This stems from her 's illness and passing a few years ago to gallbladder cancer. No other stressors in life. Sleeping and eating well. Daughter has set     Acute Illness   Acute illness concerns: sore throat, cough  Onset/Duration: 3 weeks  Symptoms:  Fever: no  Chills/Sweats:  no  Headache (location?): no  Sinus Pressure: no  Conjunctivitis:  YES- both  Ear Pain: YES- both  Rhinorrhea: no  Congestion: no  Sore Throat: YES  Cough: YES-non-productive  Wheeze: no  Decreased Appetite: no  Nausea: no  Vomiting: no  Diarrhea: no  Dysuria/Freq.: no  Dysuria or Hematuria: no  Fatigue/Achiness: no  Sick/Strep Exposure: no  Therapies tried and outcome: Tylenol    Chest Pain  Onset/Duration: 3 days  Description:   Location: entire chest, upper, close to neck  Character: sharp   Radiation: No  Duration: constant   Intensity: moderate  Progression of Symptoms: same and constant  Accompanying Signs & Symptoms:  Shortness of breath: no  Sweating: no  Nausea/vomiting: no  Lightheadedness: no  Palpitations: no  Fever/Chills: no  Cough: YES           Heartburn: YES- sometimes  History:   Family history of heart disease: YES- brother  Tobacco use: no  Previous similar symptoms: YES  Precipitating factors:   Worse with exertion: no  Worse with deep breaths: no           Related to eating: no           Better with burping: no  Alleviating factors: Tylenol  Therapies tried and outcome: Tylenol      Review of Systems   Constitutional, HEENT, cardiovascular, pulmonary, gi and gu systems are negative, except as otherwise noted.      Objective    BP (!) 147/102   Pulse 102   Temp 97.6  F (36.4  C)   Resp 16   SpO2 98%   There is no height or weight on file to calculate BMI.  Physical Exam  Vitals signs and nursing note reviewed.   Constitutional:       General: She is not in acute distress.     Appearance: She is not ill-appearing or diaphoretic.   HENT:      Head: Normocephalic and atraumatic.      Right Ear: Tympanic membrane, ear canal and external ear normal.      Left Ear: Tympanic membrane, ear canal and external ear normal.      Nose: Nose normal. No congestion or rhinorrhea.      Mouth/Throat:      Mouth: Mucous membranes are moist.      Pharynx: Oropharynx is clear.      Comments: Uvula absent.  Eyes:       Conjunctiva/sclera: Conjunctivae normal.   Neck:      Musculoskeletal: Neck supple. No neck rigidity or muscular tenderness.      Comments: Thyroid not palpable.  Cardiovascular:      Rate and Rhythm: Normal rate and regular rhythm.      Heart sounds: Normal heart sounds. No murmur. No friction rub. No gallop.       Comments: 2+ symmetric radial/PT pulses. No LE edema or tenderness.  Pulmonary:      Effort: Pulmonary effort is normal. No respiratory distress.      Breath sounds: Normal breath sounds. No stridor. No wheezing, rhonchi or rales.   Abdominal:      General: Bowel sounds are normal. There is no distension.      Palpations: Abdomen is soft. There is no mass.      Tenderness: There is no abdominal tenderness. There is no guarding or rebound.      Hernia: No hernia is present.   Lymphadenopathy:      Cervical: No cervical adenopathy.   Skin:     General: Skin is warm and dry.   Neurological:      General: No focal deficit present.      Mental Status: She is alert. Mental status is at baseline.   Psychiatric:         Mood and Affect: Mood normal.         Behavior: Behavior normal.       Results for orders placed or performed in visit on 05/24/21   Streptococcus A Rapid Scr w Reflx to PCR     Status: None    Specimen: Throat   Result Value Ref Range    Strep Specimen Description Throat     Streptococcus Group A Rapid Screen Negative NEG^Negative     EKG: Sinus rhythm. HR 93bpm. No significant changes from previous study on 10/26/20.    XR soft tissue neck:  Epiglottis normal. Patent airway per my read. Rad read pending.     CBC and TSH pending.

## 2021-05-24 ENCOUNTER — ANCILLARY PROCEDURE (OUTPATIENT)
Dept: GENERAL RADIOLOGY | Facility: CLINIC | Age: 51
End: 2021-05-24
Attending: PHYSICIAN ASSISTANT
Payer: COMMERCIAL

## 2021-05-24 ENCOUNTER — OFFICE VISIT (OUTPATIENT)
Dept: FAMILY MEDICINE | Facility: CLINIC | Age: 51
End: 2021-05-24
Payer: COMMERCIAL

## 2021-05-24 VITALS
DIASTOLIC BLOOD PRESSURE: 102 MMHG | TEMPERATURE: 97.6 F | SYSTOLIC BLOOD PRESSURE: 147 MMHG | RESPIRATION RATE: 16 BRPM | OXYGEN SATURATION: 98 % | HEART RATE: 102 BPM

## 2021-05-24 DIAGNOSIS — R07.0 THROAT DISCOMFORT: ICD-10-CM

## 2021-05-24 DIAGNOSIS — F41.9 ANXIETY: ICD-10-CM

## 2021-05-24 DIAGNOSIS — R07.0 THROAT DISCOMFORT: Primary | ICD-10-CM

## 2021-05-24 LAB
BASOPHILS # BLD AUTO: 0 10E9/L (ref 0–0.2)
BASOPHILS NFR BLD AUTO: 0.5 %
DEPRECATED S PYO AG THROAT QL EIA: NEGATIVE
DIFFERENTIAL METHOD BLD: ABNORMAL
EOSINOPHIL # BLD AUTO: 0.1 10E9/L (ref 0–0.7)
EOSINOPHIL NFR BLD AUTO: 2.2 %
ERYTHROCYTE [DISTWIDTH] IN BLOOD BY AUTOMATED COUNT: 13.7 % (ref 10–15)
HCT VFR BLD AUTO: 42.7 % (ref 35–47)
HGB BLD-MCNC: 14.1 G/DL (ref 11.7–15.7)
LYMPHOCYTES # BLD AUTO: 1.3 10E9/L (ref 0.8–5.3)
LYMPHOCYTES NFR BLD AUTO: 35.8 %
MCH RBC QN AUTO: 28.5 PG (ref 26.5–33)
MCHC RBC AUTO-ENTMCNC: 33 G/DL (ref 31.5–36.5)
MCV RBC AUTO: 86 FL (ref 78–100)
MONOCYTES # BLD AUTO: 0.3 10E9/L (ref 0–1.3)
MONOCYTES NFR BLD AUTO: 7 %
NEUTROPHILS # BLD AUTO: 2 10E9/L (ref 1.6–8.3)
NEUTROPHILS NFR BLD AUTO: 54.5 %
PLATELET # BLD AUTO: 242 10E9/L (ref 150–450)
RBC # BLD AUTO: 4.94 10E12/L (ref 3.8–5.2)
SPECIMEN SOURCE: NORMAL
SPECIMEN SOURCE: NORMAL
STREP GROUP A PCR: NOT DETECTED
TSH SERPL DL<=0.005 MIU/L-ACNC: 1.23 MU/L (ref 0.4–4)
WBC # BLD AUTO: 3.7 10E9/L (ref 4–11)

## 2021-05-24 PROCEDURE — 99N1174 PR STATISTIC STREP A RAPID: Performed by: PHYSICIAN ASSISTANT

## 2021-05-24 PROCEDURE — 93000 ELECTROCARDIOGRAM COMPLETE: CPT | Performed by: PHYSICIAN ASSISTANT

## 2021-05-24 PROCEDURE — 84443 ASSAY THYROID STIM HORMONE: CPT | Performed by: PHYSICIAN ASSISTANT

## 2021-05-24 PROCEDURE — 99215 OFFICE O/P EST HI 40 MIN: CPT | Performed by: PHYSICIAN ASSISTANT

## 2021-05-24 PROCEDURE — 87651 STREP A DNA AMP PROBE: CPT | Performed by: PHYSICIAN ASSISTANT

## 2021-05-24 PROCEDURE — 85025 COMPLETE CBC W/AUTO DIFF WBC: CPT | Performed by: PHYSICIAN ASSISTANT

## 2021-05-24 PROCEDURE — 36415 COLL VENOUS BLD VENIPUNCTURE: CPT | Performed by: PHYSICIAN ASSISTANT

## 2021-05-24 PROCEDURE — 70360 X-RAY EXAM OF NECK: CPT | Performed by: RADIOLOGY

## 2021-05-24 RX ORDER — HYDROXYZINE PAMOATE 25 MG/1
25 CAPSULE ORAL 3 TIMES DAILY PRN
Qty: 60 CAPSULE | Refills: 0 | Status: SHIPPED | OUTPATIENT
Start: 2021-05-24 | End: 2022-01-12

## 2021-05-24 RX ORDER — FAMOTIDINE 20 MG/1
20 TABLET, FILM COATED ORAL 2 TIMES DAILY
Qty: 28 TABLET | Refills: 0 | Status: SHIPPED | OUTPATIENT
Start: 2021-05-24 | End: 2021-06-07

## 2021-05-24 NOTE — PATIENT INSTRUCTIONS
Rachel Paniagua,    Thank you for allowing [facility] to manage your care.    I am unsure of the cause of your symptoms, but your exam is reassuring. This couldbe due to your anxiety. We will see what our workup shows.     If you develop worsening/changing symptoms at any time, please call 911 or go to the emergency department for evaluation.    I ordered some lab work, please go to the laboratory to get your studies.    I ordered some xrays, please go to our radiology department to get your xrays.    I sent your prescriptions to your pharmacy. Take the famotidine to cover for possible acid reflux and the Flonase nasal spray for possible allergies as causes of your symptoms.     I made referrals to ear/nose/throat and to talk therapy. They may call you to set up appointments. See them as needed.    Please allow 1-2 business days for our office to contact you in regards to your laboratory/radiological studies.  If not done so, I encourage you to login into [employer] ([email or web address] to review your results as well.     If you have any questions or concerns, please feel free to call us at [phone]    Sincerely,    Jimmie Tang PA-C    Did you know?      You can schedule a video visit for follow-up appointments as well as future appointments for certain conditions.  Please see the below link.     [email or web address]    If you have not already done so,  I encourage you to sign up for [employer] ([email or web address]).  This will allow you to review your results, securely communicate with a provider, and schedule virtual visits as well.

## 2021-06-08 NOTE — PROGRESS NOTES
Nurse Note      Itinerary:  [place]      Departure Date: 6/15/21      Return Date: 8/4/21      Length of Trip 2months      Reason for Travel: Tourism           Urban or rural: both      Accommodations: Hotel    Family home        IMMUNIZATION HISTORY  Have you received any immunizations within the past 4 weeks?  No  Have you ever fainted from having your blood drawn or from an injection?  No  Have you ever had a fever reaction to vaccination?  No  Have you ever had any bad reaction or side effect from any vaccination?  No  Have you ever had hepatitis A or B vaccine?  Yes  Do you live (or work closely) with anyone who has AIDS, an AIDS-like condition, any other immune disorder or who is on chemotherapy for cancer?  No  Do you have a family history of immunodeficiency?  No  Have you received any injection of immune globulin or any blood products during the past 12 months?  No    Patient roomed by Jose Chavez  Arcelia Liz is a 51 year old female seen today with children for counsultation for international travel.   Patient will be departing in  5 day(s) and  traveling with family member(s).      Patient itinerary :  will begin with a transit through [facility] > [facility] for 3 weeks> [facility] for 1 week > [facility] > [place] for 10 days then [place] for 10 days  which risk for Malaria, Yellow Fever, food borne illnesses, motor vehicle accidents, Typhoid and covid. exposure.      Patient's activities will include sightseeing, visiting friends and relatives and travel by car or other vehicle.    Patient's country of birth is [place]    Special medical concerns: none  Pre-travel questionnaire was completed by patient and reviewed by provider.     Vitals: /86   Temp 98.5  F (36.9  C) (Tympanic)   BMI= There is no height or weight on file to calculate BMI.    EXAM:  General:  Well-nourished, well-developed in no acute distress.  Appears to be stated age, interacts  appropriately and expresses understanding of information given to patient.    Current Outpatient Medications   Medication Sig Dispense Refill     atovaquone-proguanil (MALARONE) 250-100 MG tablet Take 1 tablet by mouth daily Start 2 days before exposure to Malaria and continue daily till  7 days after exposure. 60 tablet 0     azithromycin (ZITHROMAX) 500 MG tablet Take 1 tablet (500 mg) by mouth daily for 3 doses Take 1 tablet a day for up to 3 days for severe diarrhea 3 tablet 0     acetaminophen (TYLENOL) 325 MG tablet Take 2 tablets (650 mg) by mouth every 6 hours as needed for mild pain Alternate with ibuprofen so you are taking one or the other every three hours. For example take tylenol at 5am, then ibuprofen at 8am, then tylenol at 11am, and so on. 50 tablet 0     Benzocaine-Menthol (CEPACOL SORE THROAT) 15-2.6 MG LOZG Take 1 lozenge by mouth every 6 hours as needed (sore throat) 18 lozenge 1     clotrimazole (LOTRIMIN) 1 % external cream Apply topically 2 times daily 30 g 0     clotrimazole (LOTRIMIN) 1 % external cream Apply topically 2 times daily 30 g 3     cyclobenzaprine (FLEXERIL) 10 MG tablet Take 0.5-1 tablets (5-10 mg) by mouth 3 times daily as needed for muscle spasms 25 tablet 0     fluticasone (FLONASE) 50 MCG/ACT nasal spray Spray 1 spray into both nostrils daily as needed (For nasal congestion and sore throat.) 11.1 mL 0     hydrOXYzine (VISTARIL) 25 MG capsule Take 1 capsule (25 mg) by mouth 3 times daily as needed for anxiety 60 capsule 0     triamcinolone (KENALOG) 0.1 % external cream Apply topically 2 times daily (Patient not taking: Reported on 5/13/2021) 30 g 0     Patient Active Problem List   Diagnosis     CARDIOVASCULAR SCREENING; LDL GOAL LESS THAN 160     Epigastric pain     Mantoux: positive     Inflammatory acne     Comedonal acne     Anemia     Gallbladder sludge     Allergies   Allergen Reactions     Nkda [No Known Drug Allergies]          Immunizations discussed include:    Hepatitis A:  Ordered/given today, risks, benefits and side effects reviewed  Hepatitis B: Up to date  Influenza: Ordered  Typhoid: Ordered/given today, risks, benefits and side effects reviewed  Rabies: Declined  reviewed managment of a animal bite or scratch (washing wound, seek medical care within 24 hours for post exposure prophylaxis ) and Insufficient time to vaccinate  Yellow Fever: Yellow Fever ordered/given today - side effects, precautions, allergies, risks discussed. Patient expressed understanding.  Faroese Encephalitis: Not indicated  Meningococcus: Ordered/given today, risks, benefits and side effects reviewed  Tetanus/Diphtheria: Up to date  Measles/Mumps/Rubella: Up to date  Cholera: Not needed  Polio: Ordered/given today, risks, benefits and side effects reviewed  Pneumococcal: Under age of 65  Varicella: Immune (titers)   Zostavax:  Not indicated  Shingrix: deferred  HPV:  Not indicated  TB:  Tests pos      ASSESSMENT/PLAN:  Arcelia was seen today for travel clinic.    Diagnoses and all orders for this visit:    Travel advice encounter  -     atovaquone-proguanil (MALARONE) 250-100 MG tablet; Take 1 tablet by mouth daily Start 2 days before exposure to Malaria and continue daily till  7 days after exposure.  -     azithromycin (ZITHROMAX) 500 MG tablet; Take 1 tablet (500 mg) by mouth daily for 3 doses Take 1 tablet a day for up to 3 days for severe diarrhea    Other orders  -     YELLOW FEVER IMMUNIZATN,LIVE,SUBCUT  -     TYPHOID VACCINE, IM  -     HEPATITIS A VACCINE (ADULT)  -     INFLUENZA VACCINE IM > 6 MONTHS VALENT IIV4  -     IPV, IM/SUBQ (6+ WKS)  -     MENINGOCOCCAL (MENACTRA )      I have reviewed general recommendations for safe travel   including: food/water precautions, insect precautions, safer sex   practices given high prevalence of Zika, HIV and other STDs,   roadway safety. Educational materials and [employer] report provided.    Malaraia prophylaxis recommended:  Hakan  Symptomatic treatment for traveler's diarrhea: azithromycin    Personal protective measures reviewed including hand sanitizing and contact precautions for the prevention of viral illnesses. Cover coughs and masking  during travel and upon return.  Current COVID 19 pandemic.   Monitor / follow current CDC guidelines.    Country specific Covid info access given    Covid testing options reviewed     Evacuation insurance advised and resources were provided to patient.    Total visit time 30 minutes  with over 50% of time spent counseling patient as detailed above.    Merna Laurent CNP

## 2021-06-09 ENCOUNTER — OFFICE VISIT (OUTPATIENT)
Dept: FAMILY MEDICINE | Facility: CLINIC | Age: 51
End: 2021-06-09
Payer: COMMERCIAL

## 2021-06-09 VITALS — TEMPERATURE: 98.5 F | DIASTOLIC BLOOD PRESSURE: 86 MMHG | SYSTOLIC BLOOD PRESSURE: 116 MMHG

## 2021-06-09 DIAGNOSIS — Z71.84 TRAVEL ADVICE ENCOUNTER: Primary | ICD-10-CM

## 2021-06-09 PROCEDURE — 90713 POLIOVIRUS IPV SC/IM: CPT | Performed by: NURSE PRACTITIONER

## 2021-06-09 PROCEDURE — 90686 IIV4 VACC NO PRSV 0.5 ML IM: CPT | Performed by: NURSE PRACTITIONER

## 2021-06-09 PROCEDURE — 90632 HEPA VACCINE ADULT IM: CPT | Performed by: NURSE PRACTITIONER

## 2021-06-09 PROCEDURE — 90472 IMMUNIZATION ADMIN EACH ADD: CPT | Performed by: NURSE PRACTITIONER

## 2021-06-09 PROCEDURE — 90691 TYPHOID VACCINE IM: CPT | Performed by: NURSE PRACTITIONER

## 2021-06-09 PROCEDURE — 99401 PREV MED CNSL INDIV APPRX 15: CPT | Mod: 25 | Performed by: NURSE PRACTITIONER

## 2021-06-09 PROCEDURE — 90717 YELLOW FEVER VACCINE SUBQ: CPT | Performed by: NURSE PRACTITIONER

## 2021-06-09 PROCEDURE — 90471 IMMUNIZATION ADMIN: CPT | Performed by: NURSE PRACTITIONER

## 2021-06-09 PROCEDURE — 90734 MENACWYD/MENACWYCRM VACC IM: CPT | Performed by: NURSE PRACTITIONER

## 2021-06-09 RX ORDER — AZITHROMYCIN 500 MG/1
500 TABLET, FILM COATED ORAL DAILY
Qty: 3 TABLET | Refills: 0 | Status: SHIPPED | OUTPATIENT
Start: 2021-06-09 | End: 2021-06-12

## 2021-06-09 RX ORDER — ATOVAQUONE AND PROGUANIL HYDROCHLORIDE 250; 100 MG/1; MG/1
1 TABLET, FILM COATED ORAL DAILY
Qty: 60 TABLET | Refills: 0 | Status: SHIPPED | OUTPATIENT
Start: 2021-06-09 | End: 2022-01-12

## 2021-06-09 NOTE — PATIENT INSTRUCTIONS
Thank you for visiting the [facility] : [phone]  Today June 9, 2021 you received the    Flu Vaccine    Hepatitis A Vaccine - Please return on 12/6/21 or later for your 2nd and final dose.    Yellow Fever (YF)    Polio (IPV) Vaccine    Typhoid - injectable. This vaccine is valid for two years.       Follow up vaccine appointments can be made as a NURSE ONLY visit at the Travel Clinic, (BE PREPARED TO WAIT, ) or at designated [facility].    If you are receiving the Rabies vaccines series, it is important that you follow the exact schedule ordered.     Pre-travel     We recommend that you purchase Trip Evacuation Insurance prior to your departure.  https://wwwnc.cdc.gov/travel/page/insurance    Post-travel  contact your provider if you develop a fever, rash, cough, diarrhea or other symptoms.  Inform your healthcare provider when and where you traveled to.    Animal Exposure: Avoid all mammals even if they look healthy.  If there is a bite, scratch or even a lick, wash area immediately with soap and water for 15 minutes and seek medical care within 24 hours for evaluation of Rabies post exposure treatment.  Contact your Medical Evacuation Insurance.    COVID 19 (Sars Cov2) prevention strategies  Physical distancing: Maintain 6 foot (2m) from others.              Avoid large gatherings and public transportation.   Avoid indoor shopping malls, theaters and restaurants   Practice consistent mask wearing covering the nose, mouth and underneath the chin when unable to maintain 6 foot distance from others.  Hand washing: frequent, thorough handwashing with soap and water for 20 seconds (or using a hand  containing 60% alcohol)   Avoid touching face, nose, eyes, mouth unless you have done appropriate hand washing as above.   Clean high touch surfaces with approved disinfectant against Covid 19  (70% Ethanol ) or a bleach solution (add 20 mL (4 teaspoons) of  bleach to 1 L (1 quart) of water;)  Be careful not to breath or touch bleach.      Travel Covid 19 Testing:  updated 05/01/2021  International travelers: Pre-travel: diagnostic testing (antigen or PCR) as required for each country for entry:  See the Embassy websites or airline websites.    [place] Requirements: All air passengers coming to the [place], including [place] citizens, are required to have a negative COVID-19 test result (within 3 calendar days) even if vaccinated or documentation of recovery from COVID-19 before they board a flight to the [place].    Post travel: CDC recommends getting tested 3-5 days after your trip AND stay home and self-quarantine for 7 days. Even if you test negative, stay home and self-quarantine for the full 7 days. If you don t get tested, stay home and self-quarantine for 10 days.    COVID-19 testing for pre-travel through [facility]  [phone] (Must have an order)    Please call the [facility] with any questions [phone]  Or send your provider a 'My Chart' note.         [facility]  [address] [place] [address]  [email or web address]  Distance: 7.65 miles

## 2021-08-25 ENCOUNTER — OFFICE VISIT (OUTPATIENT)
Dept: OTOLARYNGOLOGY | Facility: CLINIC | Age: 51
End: 2021-08-25
Payer: COMMERCIAL

## 2021-08-25 VITALS
SYSTOLIC BLOOD PRESSURE: 156 MMHG | OXYGEN SATURATION: 98 % | RESPIRATION RATE: 18 BRPM | HEART RATE: 125 BPM | DIASTOLIC BLOOD PRESSURE: 102 MMHG

## 2021-08-25 DIAGNOSIS — M54.2 NECK PAIN: Primary | ICD-10-CM

## 2021-08-25 PROCEDURE — 99203 OFFICE O/P NEW LOW 30 MIN: CPT | Performed by: OTOLARYNGOLOGY

## 2021-08-25 NOTE — LETTER
8/25/2021         RE: Arcelia Liz  [address] [facility] [id number]        Dear Colleague,    Thank you for referring your patient, Arcelia Liz, to the [facility]. Please see a copy of my visit note below.    I am seeing this patient in consultation for throat discomfort at the request of the provider Lucien Tang.    Chief Complaint - neck and ear pain    History of Present Illness - Arcelia Liz is a 51 year old female who presents with the bilateral neck pain, radiates to ears. It comes and goes. It does not affect the swallowing. No dysphagia or odynaphagia. No hoarseness. No cough. Some pain with neck movement. It has been going on for a few months. Came on suddenly. She hasn't tried much for it. X-ray neck was normal. CBC reviewed 5/2021, and it was normal. Strep tests were normal.     Past Medical History -   Patient Active Problem List   Diagnosis     CARDIOVASCULAR SCREENING; LDL GOAL LESS THAN 160     Epigastric pain     Mantoux: positive     Inflammatory acne     Comedonal acne     Anemia     Gallbladder sludge       Current Medications -   Current Outpatient Medications:      hydrOXYzine (VISTARIL) 25 MG capsule, Take 1 capsule (25 mg) by mouth 3 times daily as needed for anxiety, Disp: 60 capsule, Rfl: 0     acetaminophen (TYLENOL) 325 MG tablet, Take 2 tablets (650 mg) by mouth every 6 hours as needed for mild pain Alternate with ibuprofen so you are taking one or the other every three hours. For example take tylenol at 5am, then ibuprofen at 8am, then tylenol at 11am, and so on. (Patient not taking: Reported on 8/25/2021), Disp: 50 tablet, Rfl: 0     atovaquone-proguanil (MALARONE) 250-100 MG tablet, Take 1 tablet by mouth daily Start 2 days before exposure to Malaria and continue daily till  7 days after exposure. (Patient not taking: Reported on 8/25/2021), Disp: 60 tablet, Rfl: 0     Benzocaine-Menthol (CEPACOL SORE THROAT) 15-2.6 MG LOZG, Take 1 lozenge by  mouth every 6 hours as needed (sore throat) (Patient not taking: Reported on 8/25/2021), Disp: 18 lozenge, Rfl: 1     clotrimazole (LOTRIMIN) 1 % external cream, Apply topically 2 times daily (Patient not taking: Reported on 8/25/2021), Disp: 30 g, Rfl: 0     clotrimazole (LOTRIMIN) 1 % external cream, Apply topically 2 times daily (Patient not taking: Reported on 8/25/2021), Disp: 30 g, Rfl: 3     cyclobenzaprine (FLEXERIL) 10 MG tablet, Take 0.5-1 tablets (5-10 mg) by mouth 3 times daily as needed for muscle spasms (Patient not taking: Reported on 8/25/2021), Disp: 25 tablet, Rfl: 0     fluticasone (FLONASE) 50 MCG/ACT nasal spray, Spray 1 spray into both nostrils daily as needed (For nasal congestion and sore throat.) (Patient not taking: Reported on 8/25/2021), Disp: 11.1 mL, Rfl: 0     triamcinolone (KENALOG) 0.1 % external cream, Apply topically 2 times daily (Patient not taking: Reported on 5/13/2021), Disp: 30 g, Rfl: 0  No current facility-administered medications for this visit.    Facility-Administered Medications Ordered in Other Visits:      sodium chloride (PF) 0.9% PF flush 57 mL, 57 mL, Intravenous, Once, Lucien Tang PA    Allergies -   Allergies   Allergen Reactions     Nkda [No Known Drug Allergies]        Social History -   Social History     Socioeconomic History     Marital status:      Spouse name: Kimberly     Number of children: 3     Years of education: None     Highest education level: None   Occupational History     Occupation: [employer]     Employer: [employer]     Comment: filing     Occupation: Nursing Assistant     Occupation: -former   Tobacco Use     Smoking status: Never Smoker     Smokeless tobacco: Never Used     Tobacco comment: Lives in smoke free household   Substance and Sexual Activity     Alcohol use: No     Drug use: No     Sexual activity: Not Currently     Partners: Male   Other Topics Concern     Parent/sibling w/ CABG, MI or  angioplasty before 65F 55M? No   Social History Narrative     None     Social Determinants of Health     Financial Resource Strain:      Difficulty of Paying Living Expenses:    Food Insecurity:      Worried About Running Out of Food in the Last Year:      Ran Out of Food in the Last Year:    Transportation Needs:      Lack of Transportation (Medical):      Lack of Transportation (Non-Medical):    Physical Activity:      Days of Exercise per Week:      Minutes of Exercise per Session:    Stress:      Feeling of Stress :    Social Connections:      Frequency of Communication with Friends and Family:      Frequency of Social Gatherings with Friends and Family:      Attends [employer] Services:      Active Member of Clubs or Organizations:      Attends Club or Organization Meetings:      Marital Status:    Intimate Partner Violence:      Fear of Current or Ex-Partner:      Emotionally Abused:      Physically Abused:      Sexually Abused:        Family History -   Family History   Problem Relation Age of Onset     Asthma Father      Diabetes Father      No Known Problems Mother      No Known Problems Brother      No Known Problems Sister      No Known Problems Brother      No Known Problems Brother      No Known Problems Brother      No Known Problems Brother      No Known Problems Brother      No Known Problems Brother      No Known Problems Brother      No Known Problems Brother      No Known Problems Sister      No Known Problems Sister      No Known Problems Sister      No Known Problems Sister      No Known Problems Sister      No Known Problems Sister      Glaucoma No family hx of      Macular Degeneration No family hx of      Thyroid Disease No family hx of      Cerebrovascular Disease No family hx of      Cancer No family hx of      Hypertension No family hx of      C.A.D. No family hx of      Lupus No family hx of      Thrombophilia No family hx of      Psoriasis No family hx of      Eczema No family hx of       Melanoma No family hx of      Anesthesia Reaction No family hx of      Bleeding Diathesis No family hx of        Review of Systems - As per HPI and PMHx, otherwise 7 system review is negative.    Physical Exam  BP (!) 156/102   Pulse (!) 125   Resp 18   SpO2 98%   General - The patient is in no distress.  Alert and oriented to person and place, answers questions and cooperates with examination appropriately.   Neurologic - CN II-XII are grossly intact, no focal neurologic deficits. HB 1 out of 6 bilaterally.  Voice and Breathing - The patient was breathing comfortably without the use of accessory muscles. There was no wheezing, stridor, or stertor.  The patients voice was clear and strong.  Eyes - Extraocular movements intact. Sclera were not icteric or injected, conjunctiva were pink and moist.  Ears - The tympanic membranes are normal in appearance, bony landmarks are intact.  No retraction, perforation, or masses. No fluid or purulence was seen in the external canal or the middle ear. No evidence of infection of the middle ear or external canal, cerumen was normal in appearance.  Mouth - Dentition in fair condition, no masses, ulcerations, or erosions noted on examination of mucosa. The tongue is mobile and midline, and the uvula is midline on elevation. The dental exam was notable for wear facets that were consistent with bruxism.  Palpation of the TMJs was not tender.    Throat - The walls of the oropharynx were smooth, symmetric, and had no lesions or ulcerations. The uvula was midline on elevation.    Neck - soft, non-tender. No masses. Palpation of the occipital, submental, submandibular, internal jugular chain, and supraclavicular nodes did not demonstrate any abnormal lymph nodes or masses. Palpation of the thyroid was soft and smooth, with no nodules or goiter appreciated.  The trachea was mobile and midline.        A/P - Arcelia Liz is a 51 year old female who presents with intermittent neck pain  that radiates to both ears.  Its equally a problem on both sides.  She has no warning symptoms such as no dysphagia, odynophagia, hoarseness, hemoptysis.  She seems to have a considerable amount of anxiety.  I worried this may be tension or muscle skeletal in nature.  She should try hot packs, massage, stretching, possibly a muscle relaxant, and maybe most importantly get her anxiety under better control.  She admits to not trying the anxiety medicine she was given.  She did discuss a lot of anxiety and depression regarding the loss of her .  Also encouraged her to consider a therapist.  If things worsen or she develops warning symptoms we could always consider CT neck.    Librado Bang MD  Otolaryngology  [facility]          Again, thank you for allowing me to participate in the care of your patient.        Sincerely,        Librado Bang MD

## 2021-08-25 NOTE — PROGRESS NOTES
I am seeing this patient in consultation for throat discomfort at the request of the provider Lucien Tang.    Chief Complaint - neck and ear pain    History of Present Illness - Arcelia Liz is a 51 year old female who presents with the bilateral neck pain, radiates to ears. It comes and goes. It does not affect the swallowing. No dysphagia or odynaphagia. No hoarseness. No cough. Some pain with neck movement. It has been going on for a few months. Came on suddenly. She hasn't tried much for it. X-ray neck was normal. CBC reviewed 5/2021, and it was normal. Strep tests were normal.     Past Medical History -   Patient Active Problem List   Diagnosis     CARDIOVASCULAR SCREENING; LDL GOAL LESS THAN 160     Epigastric pain     Mantoux: positive     Inflammatory acne     Comedonal acne     Anemia     Gallbladder sludge       Current Medications -   Current Outpatient Medications:      hydrOXYzine (VISTARIL) 25 MG capsule, Take 1 capsule (25 mg) by mouth 3 times daily as needed for anxiety, Disp: 60 capsule, Rfl: 0     acetaminophen (TYLENOL) 325 MG tablet, Take 2 tablets (650 mg) by mouth every 6 hours as needed for mild pain Alternate with ibuprofen so you are taking one or the other every three hours. For example take tylenol at 5am, then ibuprofen at 8am, then tylenol at 11am, and so on. (Patient not taking: Reported on 8/25/2021), Disp: 50 tablet, Rfl: 0     atovaquone-proguanil (MALARONE) 250-100 MG tablet, Take 1 tablet by mouth daily Start 2 days before exposure to Malaria and continue daily till  7 days after exposure. (Patient not taking: Reported on 8/25/2021), Disp: 60 tablet, Rfl: 0     Benzocaine-Menthol (CEPACOL SORE THROAT) 15-2.6 MG LOZG, Take 1 lozenge by mouth every 6 hours as needed (sore throat) (Patient not taking: Reported on 8/25/2021), Disp: 18 lozenge, Rfl: 1     clotrimazole (LOTRIMIN) 1 % external cream, Apply topically 2 times daily (Patient not taking: Reported on 8/25/2021), Disp: 30  g, Rfl: 0     clotrimazole (LOTRIMIN) 1 % external cream, Apply topically 2 times daily (Patient not taking: Reported on 8/25/2021), Disp: 30 g, Rfl: 3     cyclobenzaprine (FLEXERIL) 10 MG tablet, Take 0.5-1 tablets (5-10 mg) by mouth 3 times daily as needed for muscle spasms (Patient not taking: Reported on 8/25/2021), Disp: 25 tablet, Rfl: 0     fluticasone (FLONASE) 50 MCG/ACT nasal spray, Spray 1 spray into both nostrils daily as needed (For nasal congestion and sore throat.) (Patient not taking: Reported on 8/25/2021), Disp: 11.1 mL, Rfl: 0     triamcinolone (KENALOG) 0.1 % external cream, Apply topically 2 times daily (Patient not taking: Reported on 5/13/2021), Disp: 30 g, Rfl: 0  No current facility-administered medications for this visit.    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 No   Social History Narrative     None     Social Determinants of Health     Financial Resource Strain:      Difficulty of Paying Living Expenses:    Food Insecurity:      Worried About Running Out of Food in the Last Year:       Ran Out of Food in the Last Year:    Transportation Needs:      Lack of Transportation (Medical):      Lack of Transportation (Non-Medical):    Physical Activity:      Days of Exercise per Week:      Minutes of Exercise per Session:    Stress:      Feeling of Stress :    Social Connections:      Frequency of Communication with Friends and Family:      Frequency of Social Gatherings with Friends and Family:      Attends Protestant Services:      Active Member of Clubs or Organizations:      Attends Club or Organization Meetings:      Marital Status:    Intimate Partner Violence:      Fear of Current or Ex-Partner:      Emotionally Abused:      Physically Abused:      Sexually Abused:        Family History -   Family History   Problem Relation Age of Onset     Asthma Father      Diabetes Father      No Known Problems Mother      No Known Problems Brother      No Known Problems Sister      No Known Problems Brother      No Known Problems Brother      No Known Problems Brother      No Known Problems Brother      No Known Problems Brother      No Known Problems Brother      No Known Problems Brother      No Known Problems Brother      No Known Problems Sister      No Known Problems Sister      No Known Problems Sister      No Known Problems Sister      No Known Problems Sister      No Known Problems Sister      Glaucoma No family hx of      Macular Degeneration No family hx of      Thyroid Disease No family hx of      Cerebrovascular Disease No family hx of      Cancer No family hx of      Hypertension No family hx of      C.A.D. No family hx of      Lupus No family hx of      Thrombophilia No family hx of      Psoriasis No family hx of      Eczema No family hx of      Melanoma No family hx of      Anesthesia Reaction No family hx of      Bleeding Diathesis No family hx of        Review of Systems - As per HPI and PMHx, otherwise 7 system review is negative.    Physical Exam  BP (!) 156/102   Pulse (!) 125   Resp 18    SpO2 98%   General - The patient is in no distress.  Alert and oriented to person and place, answers questions and cooperates with examination appropriately.   Neurologic - CN II-XII are grossly intact, no focal neurologic deficits. HB 1 out of 6 bilaterally.  Voice and Breathing - The patient was breathing comfortably without the use of accessory muscles. There was no wheezing, stridor, or stertor.  The patients voice was clear and strong.  Eyes - Extraocular movements intact. Sclera were not icteric or injected, conjunctiva were pink and moist.  Ears - The tympanic membranes are normal in appearance, bony landmarks are intact.  No retraction, perforation, or masses. No fluid or purulence was seen in the external canal or the middle ear. No evidence of infection of the middle ear or external canal, cerumen was normal in appearance.  Mouth - Dentition in fair condition, no masses, ulcerations, or erosions noted on examination of mucosa. The tongue is mobile and midline, and the uvula is midline on elevation. The dental exam was notable for wear facets that were consistent with bruxism.  Palpation of the TMJs was not tender.    Throat - The walls of the oropharynx were smooth, symmetric, and had no lesions or ulcerations. The uvula was midline on elevation.    Neck - soft, non-tender. No masses. Palpation of the occipital, submental, submandibular, internal jugular chain, and supraclavicular nodes did not demonstrate any abnormal lymph nodes or masses. Palpation of the thyroid was soft and smooth, with no nodules or goiter appreciated.  The trachea was mobile and midline.        A/P - Arcelia Liz is a 51 year old female who presents with intermittent neck pain that radiates to both ears.  Its equally a problem on both sides.  She has no warning symptoms such as no dysphagia, odynophagia, hoarseness, hemoptysis.  She seems to have a considerable amount of anxiety.  I worried this may be tension or muscle skeletal in  nature.  She should try hot packs, massage, stretching, possibly a muscle relaxant, and maybe most importantly get her anxiety under better control.  She admits to not trying the anxiety medicine she was given.  She did discuss a lot of anxiety and depression regarding the loss of her .  Also encouraged her to consider a therapist.  If things worsen or she develops warning symptoms we could always consider CT neck.    Librado Bang MD  Otolaryngology  [facility]

## 2021-09-09 ENCOUNTER — VIRTUAL VISIT (OUTPATIENT)
Dept: FAMILY MEDICINE | Facility: CLINIC | Age: 51
End: 2021-09-09
Payer: COMMERCIAL

## 2021-09-09 DIAGNOSIS — R07.0 THROAT PAIN: Primary | ICD-10-CM

## 2021-09-09 PROCEDURE — 99213 OFFICE O/P EST LOW 20 MIN: CPT | Mod: 95 | Performed by: PHYSICIAN ASSISTANT

## 2021-09-09 NOTE — PROGRESS NOTES
Arcelia is a 51 year old who is being evaluated via a billable video visit.      How would you like to obtain your AVS? MyChart  If the video visit is dropped, the invitation should be resent by: Text to cell phone: [phone]  Will anyone else be joining your video visit? No    Video Start Time: 1:13 PM    Assessment & Plan   Problem List Items Addressed This Visit     None      Visit Diagnoses     Throat pain    -  Primary    Relevant Orders    CT Soft Tissue Neck w Contrast (Completed)         Likely globus sensation from anxiety, but will order ct neck to further clarify. Appears well and non-toxic and I have low suspicion for systemic illness, impending airway obstruction or respiratory distress.    DDx and Dx discussed with and explained to the pt to their satisfaction.  All questions were answered at this time. Pt expressed understanding of and agreement with this dx, tx, and plan. No further workup warranted and standard medication warnings given. I have given the patient a list of pertinent indications for re-evaluation. Will go to the Emergency Department if symptoms worsen or new concerning symptoms arise. Patient left the call in no apparent distress.     See Patient Instructions    Return in about 1 month (around 10/9/2021) for a recheck of your symptoms if not improving, or call 911/go to an ER anytime if worsening.    ENRIQUETA Ceja  [facility] YANIRA Paniagua is a 51 year old who presents for the following health issues     HPI     Anterior throat discomfort getting worse. Has tried nothing new since the last visit with provider. 4/10 pain and non-radiating. Intermittent. Not worse with swallowing. Saw ENT who recommended CT if not improving/worsening. Admits that hydroxyzine improves this and this may be secondary to anxiety since her   yeats ago.    Review of Systems   Constitutional, HEENT, cardiovascular, pulmonary, gi and gu systems are negative,  except as otherwise noted.     Objective         Vitals:  No vitals were obtained today due to virtual visit.    Physical Exam   GENERAL: Healthy, alert and no distress  EYES: Eyes grossly normal to inspection.  No discharge or erythema, or obvious scleral/conjunctival abnormalities.  HENT: no trismus. Oropharynx clear and moist. Normal ROM of neck.  RESP: No audible wheeze, cough, or visible cyanosis.  No visible retractions or increased work of breathing.    SKIN: Visible skin clear. No significant rash, abnormal pigmentation or lesions.  NEURO: Cranial nerves grossly intact.  Mentation and speech appropriate for age.  PSYCH: Mentation appears normal, affect normal/bright, judgement and insight intact, normal speech and appearance well-groomed.    CT soft tissue neck ordered w contrast     Video-Visit Details    Type of service:  Video Visit    Video End Time: 1:21 PM    Originating Location (pt. Location): Home    Distant Location (provider location):  [facility] YANIRA     Platform used for Video Visit: [email or web address]

## 2021-09-09 NOTE — PATIENT INSTRUCTIONS
Rachel Paniagua,    Thank you for allowing [facility] to manage your care.    I am unsure of the cause of your symptoms, but your exam is reassuring. We will see what our workup shows.     If you develop worsening/changing symptoms at any time, please call 911 or go to the emergency department for evaluation.    I ordered a CT, please call diagnostic imaging [phone] to schedule your test.    Please allow 1-2 business days for our office to contact you in regards to your laboratory/radiological studies.  If not done so, I encourage you to login into [employer] ([email or web address] to review your results as well.     Drink 8-10 glasses of fluid daily to stay well-hydrated.    If you have any questions or concerns, please feel free to call us at [phone]    Sincerely,    Jimmie Tang PA-C    Did you know?      You can schedule a video visit for follow-up appointments as well as future appointments for certain conditions.  Please see the below link.     [email or web address]    If you have not already done so,  I encourage you to sign up for [employer] ([email or web address]).  This will allow you to review your results, securely communicate with a provider, and schedule virtual visits as well.      Patient Education     Self-Care for Sore Throats     Sore throats happen for many reasons, such as colds, allergies, cigarette smoke, air pollution, and infections caused by viruses or bacteria. In any case, your throat becomes red and sore. Your goal for self-care is to reduce your discomfort while giving your throat a chance to heal.  Moisten and soothe your throat  Tips include the following:    Try a sip of water first thing after waking up.    Keep your throat moist by drinking 6 or more glasses of clear liquids every day.    Run a cool-air humidifier in your room overnight.    Stay away from cigarette smoke.     Check the air quality  index,if air pollution gives you a sore throat. On high pollution days, try to limit outdoor time.    Suck on throat lozenges, cough drops, hard candy, ice chips, or frozen fruit-juice bars. Use the sugar-free versions if your diet or medical condition requires them.  Gargle to ease irritation  Gargling every hour or 2 can ease irritation. Try gargling with 1 of these solutions:    1/4 teaspoon of salt in 1/2 cup of warm water    An over-the-counter anesthetic gargle  Use medicine for more relief  Over-the-counter medicine can reduce sore throat symptoms. Ask your pharmacist if you have questions about which medicine to use. To prevent possible medicine interactions, let the pharmacist know what medicines you take. To decrease symptoms:    Ease pain with anesthetic sprays. Aspirin or an aspirin substitute also helps. Remember, never give aspirin to anyone 18 or younger. Don't take aspirin if you are already taking blood thinners.     For sore throats caused by allergies, try antihistamines to block the allergic reaction.  Unless a sore throat is caused by a bacterial infection, antibiotics won t help you.  Prevent future sore throats  Prevention tips include:    Stop smoking or reduce contact with secondhand smoke. Smoke irritates the tender throat lining.    Limit contact with pets and with allergy-causing substances, such as pollen and mold.    Wash your hands often when you re around someone with a sore throat or cold. This will keep viruses or bacteria from spreading.    Limit outdoor time when air pollution is bad.    Don t strain your vocal cords.  When to call your healthcare provider  Contact your healthcare provider if you have:    Fever of 100.4 F (38.0 C) or higher, or as directed by your healthcare provider    White spots on the throat    Great Trouble swallowing    A skin rash    Recent exposure to someone else with strep bacteria    Severe hoarseness and swollen glands in the neck or jaw  Call  911  Call 911 if any of the following occur:    Trouble breathing or catching your breath    Drooling and problems swallowing    Wheezing    Unable to talk    Feeling dizzy or faint    Feeling of doom  Pamela last reviewed this educational content on 9/1/2019 2000-2021 [employer]. All rights reserved. This information is not intended as a substitute for professional medical care. Always follow your healthcare professional's instructions.

## 2021-09-10 ENCOUNTER — ANCILLARY PROCEDURE (OUTPATIENT)
Dept: CT IMAGING | Facility: CLINIC | Age: 51
End: 2021-09-10
Attending: PHYSICIAN ASSISTANT
Payer: COMMERCIAL

## 2021-09-10 DIAGNOSIS — R07.0 THROAT PAIN: ICD-10-CM

## 2021-09-10 PROCEDURE — 70491 CT SOFT TISSUE NECK W/DYE: CPT | Mod: TC | Performed by: RADIOLOGY

## 2021-09-10 RX ORDER — IOPAMIDOL 755 MG/ML
500 INJECTION, SOLUTION INTRAVASCULAR ONCE
Status: COMPLETED | OUTPATIENT
Start: 2021-09-10 | End: 2021-09-10

## 2021-09-10 RX ADMIN — IOPAMIDOL 80 ML: 755 INJECTION, SOLUTION INTRAVASCULAR at 09:45

## 2021-09-10 RX ADMIN — Medication 50 ML: at 09:46

## 2021-09-14 ENCOUNTER — TELEPHONE (OUTPATIENT)
Dept: FAMILY MEDICINE | Facility: CLINIC | Age: 51
End: 2021-09-14

## 2021-09-14 DIAGNOSIS — E04.1 THYROID NODULE: Primary | ICD-10-CM

## 2021-09-16 ENCOUNTER — ANCILLARY PROCEDURE (OUTPATIENT)
Dept: ULTRASOUND IMAGING | Facility: CLINIC | Age: 51
End: 2021-09-16
Attending: PHYSICIAN ASSISTANT
Payer: COMMERCIAL

## 2021-09-16 DIAGNOSIS — E04.1 THYROID NODULE: ICD-10-CM

## 2021-09-16 PROCEDURE — 76536 US EXAM OF HEAD AND NECK: CPT | Performed by: RADIOLOGY

## 2021-09-19 ENCOUNTER — HEALTH MAINTENANCE LETTER (OUTPATIENT)
Age: 51
End: 2021-09-19

## 2021-10-07 ENCOUNTER — VIRTUAL VISIT (OUTPATIENT)
Dept: FAMILY MEDICINE | Facility: CLINIC | Age: 51
End: 2021-10-07
Payer: COMMERCIAL

## 2021-10-07 DIAGNOSIS — R07.0 THROAT DISCOMFORT: ICD-10-CM

## 2021-10-07 DIAGNOSIS — F41.9 ANXIETY: Primary | ICD-10-CM

## 2021-10-07 PROCEDURE — 99214 OFFICE O/P EST MOD 30 MIN: CPT | Mod: 95 | Performed by: PHYSICIAN ASSISTANT

## 2021-10-07 NOTE — PROGRESS NOTES
Arcelia is a 51 year old who is being evaluated via a billable video visit.      How would you like to obtain your AVS? MyChart  If the video visit is dropped, the invitation should be resent by: Text to cell phone: [phone]  Will anyone else be joining your video visit? Yes: Anish (Daughter). How would they like to receive their invitation? Text to cell phone: [phone]     Video Start Time: 1:06 PM    Assessment & Plan   Problem List Items Addressed This Visit     None      Visit Diagnoses     Anxiety    -  Primary    Relevant Medications    sertraline (ZOLOFT) 50 MG tablet    Throat discomfort        Relevant Medications    sertraline (ZOLOFT) 50 MG tablet         Impression is intermittent throat discomfort thoroughly worked up and likely due to anxiety vs msk source. Hydroxyzine has been helpful. Will trial ramp of sertraline with hydroxyzine for breakthrough symptoms. Daughter has scheduled therapy for her. Follow up in one month to recheck.    DDx and Dx discussed with and explained to the pt to their satisfaction.  All questions were answered at this time. Pt expressed understanding of and agreement with this dx, tx, and plan. No further workup warranted and standard medication warnings given. I have given the patient a list of pertinent indications for re-evaluation. Will go to the Emergency Department if symptoms worsen or new concerning symptoms arise. Patient left in no apparent distress.     31 minutes spent on the date of the encounter doing chart review, history and exam, documentation and further activities per the note    See Patient Instructions    Return in about 1 month (around 11/7/2021) for a recheck of your symptoms if not improving, or call 911/go to an ER anytime if worsening.    ENRIQUETA Ceja  [facility] YANIRA Paniagua is a 51 year old who presents for the following health issues  accompanied by her daughter:    HPI     F/U for throat pain. Has  gotten better since last virtual appointment. Has not tried anything since last visit. Pain moves to both ears. Throat pain dull, sharp. Intermittent. Currently 3/10. Ok for student to watch virtual visit    Review of Systems   Constitutional, HEENT, cardiovascular, pulmonary, gi and gu systems are negative, except as otherwise noted.      Objective           Vitals:  No vitals were obtained today due to virtual visit.    Physical Exam   GENERAL: Healthy, alert and no distress  EYES: Eyes grossly normal to inspection.  No discharge or erythema, or obvious scleral/conjunctival abnormalities.  RESP: No audible wheeze, cough, or visible cyanosis.  No visible retractions or increased work of breathing.    SKIN: Visible skin clear. No significant rash, abnormal pigmentation or lesions.  NEURO: Cranial nerves grossly intact.  Mentation and speech appropriate for age.  PSYCH: Mentation appears normal, affect normal/bright, judgement and insight intact, normal speech and appearance well-groomed. Denies thoughts of self harm.    Video-Visit Details    Type of service:  Video Visit    Video End Time:1:30 PM    Originating Location (pt. Location): Home    Distant Location (provider location):  [facility] [employer]     Platform used for Video Visit: [employer]

## 2021-10-07 NOTE — PATIENT INSTRUCTIONS
Rachel Paniagua,    Thank you for allowing [facility] to manage your care.    If you develop worsening/changing symptoms at any time, please call 911 or go to the emergency department for evaluation. I sent your prescriptions to your pharmacy.    For worry and throat discomfort not controlled by sertraline, please use hydroxyzine as prescribed. Do not use this medication while driving, operating machinery, with other sedating medications, or while drinking alcohol as it will make you drowsy.    If you have any questions or concerns, please feel free to call us at [phone]    Sincerely,    Jimmie Tang PA-C    Did you know?      You can schedule a video visit for follow-up appointments as well as future appointments for certain conditions.  Please see the below link.     [email or web address]    If you have not already done so,  I encourage you to sign up for [employer] ([email or web address]).  This will allow you to review your results, securely communicate with a provider, and schedule virtual visits as well.      Patient Education     Treating Anxiety Disorders with Medicine  An anxiety disorder can make you feel nervous or apprehensive, even without a clear reason. In people age 65 and older, generalized anxiety disorder is one of the most commonly diagnosed anxiety disorders. Many times it occurs with depression. Certain anxiety disorders can cause intense feelings of fear or panic. You may even have physical symptoms such as a racing heartbeat, sweating, or dizziness. If you have these feelings, you don t have to suffer anymore. Treatment to help you overcome your fears will likely include therapy (also called counseling). Medicine may also be prescribed to help control your symptoms.     Medicines  Certain medicines may be prescribed to help control your symptoms. So you may feel less anxious. You may also feel able to move forward with therapy. At  first, medicines and dosages may need to be adjusted to find what works best for you. Try to be patient. Tell your healthcare provider how a medicine makes you feel. This way, you can work together to find the treatment that s best for you. Keep in mind that medicines can have side effects. Talk with your provider about any side effects that are bothering you. Changing the dose or type of medicine may help. Don t stop taking medicine on your own. That can cause symptoms to come back or cause dangerous withdrawal symptoms.     Anti-anxiety medicine. This medicine eases symptoms and helps you relax. Your healthcare provider will explain when and how to use it. It may be prescribed for use before situations that make you anxious. You may also be told to take medicine on a regular schedule. Anti-anxiety medicine may make you feel a little sleepy or  out of it.  Don t drive a car or operate machinery while on this medicine, until you know how it affects you.  Never use alcohol or other drugs with anti-anxiety medicines. This could result in loss of muscular control, sedation, coma, or death. Also, use only the amount of medicine prescribed for you. If you think you may have taken too much, get emergency care right away. Never share your medicines with others. Store these medicines in a safe place that can't be reached by children or visitors.   Keep taking medicines as prescribed  Never change your dosage, share or use another person's medicine, or stop taking your medicines without talking to your healthcare provider first. Keep the following in mind:     Some medicines must be taken on a schedule. Make this part of your daily routine. For instance, always take your pill before brushing your teeth. A pillbox can help you remember if you ve taken your medicine each day.    Medicines are often taken for 6 to 12 months. Your healthcare provider will then evaluate whether you need to stay on them. Many people who have also  had therapy may no longer need medicine to manage anxiety.    You may need to stop taking medicine slowly to give your body time to adjust. When it s time to stop, your healthcare provider will tell you more. Remember: Never stop taking your medicine without talking to your provider first.    If symptoms return, you may need to start taking medicines again.  This isn t your fault. It s just the nature of your anxiety disorder.  What to think about    Side effects. Medicines may cause side effects. Ask your healthcare provider or pharmacist what you can expect. They may have ideas for avoiding some side effects.    Sexual problems. Some antidepressants can affect your desire for sex or your ability to have an orgasm. A change in dosage or medicine often solves the problem. If you have a sexual side effect that concerns you, tell your healthcare provider.    Addiction. If you ve never had a problem with drugs or alcohol, you may not have a problem with medicines used to treat anxiety disorders. But always discuss the medicines with your healthcare provider before taking them. If you have a history of addiction, you may not be able to use certain medicines used to treat anxiety disorders.    Medicine interactions. Always check with your pharmacist before using any over-the-counter medicines (OTCs), including herbal supplements. Some OTCs may interact with your anti-anxiety medicines and increase or decrease their effectiveness.    [employer] last reviewed this educational content on 12/1/2019 2000-2021 [employer]. All rights reserved. This information is not intended as a substitute for professional medical care. Always follow your healthcare professional's instructions.           Patient Education     Anxiety Reaction  Anxiety is the feeling we all get when we think something bad might happen. It is a normal response to stress and normally causes only a mild reaction. When anxiety becomes more severe,  it can interfere with daily life. In some cases, you may not even be aware of what you re anxious about. There may also be a genetic link. Or it may be a learned behavior in the home.   Both psychological and physical triggers cause stress reaction. It's often a response to fear or emotional stress, real or imagined. This stress may come from home, family, work, or social relationships.   During an anxiety reaction, you may feel:    Helpless    Nervous    Depressed    Grouchy  Your body may show signs of anxiety in many ways. You may experience:    Dry mouth    Shakiness    Dizziness    Weakness    Trouble breathing    Breathing fast (hyperventilating)    Chest pressure    Sweating    Headache    Nausea    Diarrhea    Tiredness    Inability to sleep    Sexual problems  Home care    Try to find the sources of stress in your life. They may not be obvious. These may include:  ? Daily hassles of life (such as traffic jams, missed appointments, or car troubles)  ? Major life changes, both good (new baby or job promotion) and bad (loss of job or loss of loved one)  ? Overload (feeling that you have too many responsibilities and can't take care of all of them at once)  ? Feeling helpless or feeling that your problems can't be solved    Notice how your body reacts to stress. Learn to listen to your body signals. This will help you take action before the stress becomes severe.    When you can, do something about the source of your stress. (Avoid hassles, limit the amount of change that happens in your life at one time, and take a break when you feel overloaded).    Unfortunately, many stressful situations can't be avoided. It is necessary to learn how to better manage stress. There are many proven methods that will reduce your anxiety. These include simple things such as exercise, good nutrition, and adequate rest. Also, there are certain techniques that are helpful:  ? Relaxation  ? Breathing  exercises  ? Visualization  ? Biofeedback  ? Meditation  For more information about this, talk with your healthcare provider. Or check online or at your local library or bookstore. You'll find many books and audiobooks on this subject.   Follow-up care  If you feel your anxiety is not responding to self-help measures, call your healthcare provider or make an appointment with a counselor. You may need short-term psychological counseling or medicine to help you manage stress.   Call 911  Call 911 if any of these happen:     Trouble breathing    Confusion    Drowsiness or trouble waking up    Fainting or loss of consciousness    Rapid heart rate    Seizure    New chest pain that becomes more severe, lasts longer, or spreads into your shoulder, arm, neck, jaw, or back  When to get medical advice  Call your healthcare provider right away if any of these happen:    Your symptoms get worse    Severe headache not eased by rest and mild pain reliever  Pamela last reviewed this educational content on 4/1/2020 2000-2021 [employer]. All rights reserved. This information is not intended as a substitute for professional medical care. Always follow your healthcare professional's instructions.

## 2021-11-02 ENCOUNTER — VIRTUAL VISIT (OUTPATIENT)
Dept: FAMILY MEDICINE | Facility: CLINIC | Age: 51
End: 2021-11-02
Payer: COMMERCIAL

## 2021-11-02 DIAGNOSIS — R10.11 RUQ ABDOMINAL PAIN: Primary | ICD-10-CM

## 2021-11-02 PROCEDURE — 99214 OFFICE O/P EST MOD 30 MIN: CPT | Mod: 95 | Performed by: FAMILY MEDICINE

## 2021-11-02 RX ORDER — OMEPRAZOLE 40 MG/1
40 CAPSULE, DELAYED RELEASE ORAL DAILY
Qty: 90 CAPSULE | Refills: 1 | Status: SHIPPED | OUTPATIENT
Start: 2021-11-02 | End: 2022-01-12

## 2021-11-02 RX ORDER — AMPICILLIN TRIHYDRATE 500 MG
500 CAPSULE ORAL 4 TIMES DAILY
COMMUNITY
End: 2022-01-12

## 2021-11-02 NOTE — PROGRESS NOTES
Arcelia is a 51 year old who is being evaluated via a billable telephone visit.      What phone number would you like to be contacted at? [phone]  How would you like to obtain your AVS? MyChart    1. RUQ abdominal pain  - CBC with platelets; Future  - Helicobacter pylori Antigen Stool; Future  - Comprehensive metabolic panel (BMP + Alb, Alk Phos, ALT, AST, Total. 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  Mild fatty liver.  HIDA scan normal. Follow-up with EGD.       CARDIOVASCULAR SCREENING; LDL GOAL LESS THAN 160 10/31/2010     Priority: Medium     Current Outpatient Medications   Medication     ampicillin (PRINCIPEN) 500 MG capsule     acetaminophen (TYLENOL) 325 MG tablet     atovaquone-proguanil (MALARONE) 250-100 MG tablet     Benzocaine-Menthol (CEPACOL SORE THROAT) 15-2.6 MG LOZG     clotrimazole (LOTRIMIN) 1 % external cream     clotrimazole (LOTRIMIN) 1 % external cream     cyclobenzaprine (FLEXERIL) 10 MG tablet     fluticasone (FLONASE) 50 MCG/ACT nasal spray     hydrOXYzine (VISTARIL) 25 MG capsule     sertraline (ZOLOFT) 50 MG tablet     triamcinolone (KENALOG) 0.1 % external cream     No current facility-administered medications for this visit.     Facility-Administered Medications Ordered in Other Visits   Medication     sodium chloride (PF) 0.9% PF flush 57 mL     1. Right side stomach pain:  Ongoing for the past 10 days.  Diagnosed with H. Pylori in the past.  Concerned about kidney.  Feels better today.  Not associated with foods.  Patient returned back [place] from last August.  Intermittent.  Morning to midday and couple times in the afternoon.  No nausea or vomiting.  No associated with food.  States of heartburn symptoms.  Patient took penicillin for 7 days.  History of aga Paniagua is a 51 year old who presents for the following health issues     HPI     Right side abdominal pain that radiates to back [id number]. No other symptoms.         Review of Systems   Constitutional: Negative for chills and fever.   HENT: Negative for congestion, ear pain, hearing loss and sore throat.    Eyes: Negative for pain and visual disturbance.   Respiratory: Negative for cough and shortness of breath.    Cardiovascular: Negative for chest pain, palpitations and peripheral edema.   Gastrointestinal: Positive for abdominal pain. Negative for constipation, diarrhea, heartburn, hematochezia and nausea.   Breasts:  Negative for tenderness, breast mass and discharge.   Genitourinary: Negative for dysuria, frequency, genital sores, hematuria, pelvic pain, urgency, vaginal bleeding and vaginal discharge.   Musculoskeletal: Negative for arthralgias, joint swelling and myalgias.   Skin: Negative for rash.   Neurological: Negative for dizziness, weakness, headaches and paresthesias.   Psychiatric/Behavioral: Negative for mood changes. The patient is not nervous/anxious.          Objective           Vitals:  No vitals were obtained today due to virtual visit.    Physical Exam   healthy, alert and no distress  PSYCH: Alert and oriented times 3; coherent speech, normal   rate and volume, able to articulate logical thoughts, able   to abstract reason, no tangential thoughts, no hallucinations   or delusions  Her affect is normal  RESP: No cough, no audible wheezing, able to talk in full sentences  Remainder of exam  unable to be completed due to telephone visits    Phone call duration: 20 minutes

## 2021-11-02 NOTE — PATIENT INSTRUCTIONS
Terrell Paniagua,    Thank you for allowing [facility] to manage your care.    I ordered some blood work, please go to the laboratory to get your laboratory studies.  Please call [phone] to schedule your laboratory appointment.     If you have any questions or concerns, please feel free to call us at [phone].    Sincerely,    Dr. Webb    Did you know?      You can schedule a video visit for follow-up appointments as well as future appointments for certain conditions.  Please see the below link.     [email or web address]    If you have not already done so,  I encourage you to sign up for [employer] ([email or web address]).  This will allow you to review your results, securely communicate with a provider, and schedule virtual visits as well.

## 2021-11-03 ASSESSMENT — ENCOUNTER SYMPTOMS
EYE PAIN: 0
DIZZINESS: 0
ARTHRALGIAS: 0
DIARRHEA: 0
NERVOUS/ANXIOUS: 0
CHILLS: 0
CONSTIPATION: 0
BREAST MASS: 0
SHORTNESS OF BREATH: 0
HEMATOCHEZIA: 0
HEARTBURN: 0
WEAKNESS: 0
PARESTHESIAS: 0
ABDOMINAL PAIN: 1
JOINT SWELLING: 0
PALPITATIONS: 0
DYSURIA: 0
SORE THROAT: 0
FREQUENCY: 0
NAUSEA: 0
HEADACHES: 0
MYALGIAS: 0
FEVER: 0
HEMATURIA: 0
COUGH: 0

## 2021-11-04 ENCOUNTER — LAB (OUTPATIENT)
Dept: LAB | Facility: CLINIC | Age: 51
End: 2021-11-04
Payer: COMMERCIAL

## 2021-11-04 DIAGNOSIS — R10.11 RUQ ABDOMINAL PAIN: ICD-10-CM

## 2021-11-04 LAB
ALBUMIN SERPL-MCNC: 3.9 G/DL (ref 3.4–5)
ALP SERPL-CCNC: 121 U/L (ref 40–150)
ALT SERPL W P-5'-P-CCNC: 21 U/L (ref 0–50)
ANION GAP SERPL CALCULATED.3IONS-SCNC: 5 MMOL/L (ref 3–14)
AST SERPL W P-5'-P-CCNC: 14 U/L (ref 0–45)
BILIRUB SERPL-MCNC: 0.4 MG/DL (ref 0.2–1.3)
BUN SERPL-MCNC: 12 MG/DL (ref 7–30)
CALCIUM SERPL-MCNC: 10 MG/DL (ref 8.5–10.1)
CHLORIDE BLD-SCNC: 106 MMOL/L (ref 94–109)
CO2 SERPL-SCNC: 27 MMOL/L (ref 20–32)
CREAT SERPL-MCNC: 0.67 MG/DL (ref 0.52–1.04)
ERYTHROCYTE [DISTWIDTH] IN BLOOD BY AUTOMATED COUNT: 13.6 % (ref 10–15)
GFR SERPL CREATININE-BSD FRML MDRD: >90 ML/MIN/1.73M2
GLUCOSE BLD-MCNC: 98 MG/DL (ref 70–99)
HCT VFR BLD AUTO: 41.9 % (ref 35–47)
HGB BLD-MCNC: 13.9 G/DL (ref 11.7–15.7)
MCH RBC QN AUTO: 28.8 PG (ref 26.5–33)
MCHC RBC AUTO-ENTMCNC: 33.2 G/DL (ref 31.5–36.5)
MCV RBC AUTO: 87 FL (ref 78–100)
PLATELET # BLD AUTO: 225 10E3/UL (ref 150–450)
POTASSIUM BLD-SCNC: 4.2 MMOL/L (ref 3.4–5.3)
PROT SERPL-MCNC: 7.3 G/DL (ref 6.8–8.8)
RBC # BLD AUTO: 4.82 10E6/UL (ref 3.8–5.2)
SODIUM SERPL-SCNC: 138 MMOL/L (ref 133–144)
WBC # BLD AUTO: 4.8 10E3/UL (ref 4–11)

## 2021-11-04 PROCEDURE — 80053 COMPREHEN METABOLIC PANEL: CPT

## 2021-11-04 PROCEDURE — 36415 COLL VENOUS BLD VENIPUNCTURE: CPT

## 2021-11-04 PROCEDURE — 85027 COMPLETE CBC AUTOMATED: CPT

## 2021-11-05 ENCOUNTER — APPOINTMENT (OUTPATIENT)
Dept: LAB | Facility: CLINIC | Age: 51
End: 2021-11-05
Payer: COMMERCIAL

## 2021-11-05 PROCEDURE — 87338 HPYLORI STOOL AG IA: CPT

## 2021-11-08 LAB — H PYLORI AG STL QL IA: NEGATIVE

## 2022-01-09 ENCOUNTER — HEALTH MAINTENANCE LETTER (OUTPATIENT)
Age: 52
End: 2022-01-09

## 2022-01-11 ENCOUNTER — NURSE TRIAGE (OUTPATIENT)
Dept: NURSING | Facility: CLINIC | Age: 52
End: 2022-01-11
Payer: COMMERCIAL

## 2022-01-12 ENCOUNTER — ANCILLARY PROCEDURE (OUTPATIENT)
Dept: GENERAL RADIOLOGY | Facility: CLINIC | Age: 52
End: 2022-01-12
Attending: FAMILY MEDICINE
Payer: COMMERCIAL

## 2022-01-12 ENCOUNTER — OFFICE VISIT (OUTPATIENT)
Dept: FAMILY MEDICINE | Facility: CLINIC | Age: 52
End: 2022-01-12
Payer: COMMERCIAL

## 2022-01-12 VITALS
OXYGEN SATURATION: 97 % | HEIGHT: 64 IN | BODY MASS INDEX: 32.1 KG/M2 | SYSTOLIC BLOOD PRESSURE: 133 MMHG | HEART RATE: 91 BPM | DIASTOLIC BLOOD PRESSURE: 89 MMHG | WEIGHT: 188 LBS | TEMPERATURE: 98.4 F

## 2022-01-12 DIAGNOSIS — F41.1 GAD (GENERALIZED ANXIETY DISORDER): ICD-10-CM

## 2022-01-12 DIAGNOSIS — R07.9 CHEST PAIN, UNSPECIFIED TYPE: ICD-10-CM

## 2022-01-12 DIAGNOSIS — R07.9 CHEST PAIN, UNSPECIFIED TYPE: Primary | ICD-10-CM

## 2022-01-12 PROBLEM — K82.8 GALLBLADDER SLUDGE: Status: RESOLVED | Noted: 2020-08-07 | Resolved: 2022-01-12

## 2022-01-12 PROBLEM — E66.9 OBESITY: Status: ACTIVE | Noted: 2022-01-12

## 2022-01-12 LAB
ALBUMIN SERPL-MCNC: 3.9 G/DL (ref 3.4–5)
ALP SERPL-CCNC: 135 U/L (ref 40–150)
ALT SERPL W P-5'-P-CCNC: 26 U/L (ref 0–50)
ANION GAP SERPL CALCULATED.3IONS-SCNC: 3 MMOL/L (ref 3–14)
AST SERPL W P-5'-P-CCNC: 13 U/L (ref 0–45)
BASOPHILS # BLD AUTO: 0 10E3/UL (ref 0–0.2)
BASOPHILS NFR BLD AUTO: 0 %
BILIRUB SERPL-MCNC: 0.2 MG/DL (ref 0.2–1.3)
BUN SERPL-MCNC: 14 MG/DL (ref 7–30)
CALCIUM SERPL-MCNC: 9.5 MG/DL (ref 8.5–10.1)
CHLORIDE BLD-SCNC: 107 MMOL/L (ref 94–109)
CO2 SERPL-SCNC: 29 MMOL/L (ref 20–32)
CREAT SERPL-MCNC: 0.6 MG/DL (ref 0.52–1.04)
EOSINOPHIL # BLD AUTO: 0 10E3/UL (ref 0–0.7)
EOSINOPHIL NFR BLD AUTO: 1 %
ERYTHROCYTE [DISTWIDTH] IN BLOOD BY AUTOMATED COUNT: 13.4 % (ref 10–15)
GFR SERPL CREATININE-BSD FRML MDRD: >90 ML/MIN/1.73M2
GLUCOSE BLD-MCNC: 130 MG/DL (ref 70–99)
HCT VFR BLD AUTO: 42.6 % (ref 35–47)
HGB BLD-MCNC: 13.9 G/DL (ref 11.7–15.7)
LYMPHOCYTES # BLD AUTO: 1.6 10E3/UL (ref 0.8–5.3)
LYMPHOCYTES NFR BLD AUTO: 35 %
MCH RBC QN AUTO: 29.1 PG (ref 26.5–33)
MCHC RBC AUTO-ENTMCNC: 32.6 G/DL (ref 31.5–36.5)
MCV RBC AUTO: 89 FL (ref 78–100)
MONOCYTES # BLD AUTO: 0.4 10E3/UL (ref 0–1.3)
MONOCYTES NFR BLD AUTO: 9 %
NEUTROPHILS # BLD AUTO: 2.5 10E3/UL (ref 1.6–8.3)
NEUTROPHILS NFR BLD AUTO: 55 %
PLATELET # BLD AUTO: 240 10E3/UL (ref 150–450)
POTASSIUM BLD-SCNC: 4.5 MMOL/L (ref 3.4–5.3)
PROT SERPL-MCNC: 7.5 G/DL (ref 6.8–8.8)
RBC # BLD AUTO: 4.78 10E6/UL (ref 3.8–5.2)
SODIUM SERPL-SCNC: 139 MMOL/L (ref 133–144)
WBC # BLD AUTO: 4.6 10E3/UL (ref 4–11)

## 2022-01-12 PROCEDURE — 85025 COMPLETE CBC W/AUTO DIFF WBC: CPT | Performed by: FAMILY MEDICINE

## 2022-01-12 PROCEDURE — 71046 X-RAY EXAM CHEST 2 VIEWS: CPT | Performed by: RADIOLOGY

## 2022-01-12 PROCEDURE — 36415 COLL VENOUS BLD VENIPUNCTURE: CPT | Performed by: FAMILY MEDICINE

## 2022-01-12 PROCEDURE — 99214 OFFICE O/P EST MOD 30 MIN: CPT | Performed by: FAMILY MEDICINE

## 2022-01-12 PROCEDURE — 80053 COMPREHEN METABOLIC PANEL: CPT | Performed by: FAMILY MEDICINE

## 2022-01-12 RX ORDER — HYDROXYZINE PAMOATE 25 MG/1
25 CAPSULE ORAL 3 TIMES DAILY PRN
Qty: 60 CAPSULE | Refills: 0 | Status: SHIPPED | OUTPATIENT
Start: 2022-01-12 | End: 2022-12-21

## 2022-01-12 ASSESSMENT — ANXIETY QUESTIONNAIRES
7. FEELING AFRAID AS IF SOMETHING AWFUL MIGHT HAPPEN: NEARLY EVERY DAY
3. WORRYING TOO MUCH ABOUT DIFFERENT THINGS: NEARLY EVERY DAY
IF YOU CHECKED OFF ANY PROBLEMS ON THIS QUESTIONNAIRE, HOW DIFFICULT HAVE THESE PROBLEMS MADE IT FOR YOU TO DO YOUR WORK, TAKE CARE OF THINGS AT HOME, OR GET ALONG WITH OTHER PEOPLE: SOMEWHAT DIFFICULT
1. FEELING NERVOUS, ANXIOUS, OR ON EDGE: NEARLY EVERY DAY
5. BEING SO RESTLESS THAT IT IS HARD TO SIT STILL: MORE THAN HALF THE DAYS
GAD7 TOTAL SCORE: 17
6. BECOMING EASILY ANNOYED OR IRRITABLE: SEVERAL DAYS
2. NOT BEING ABLE TO STOP OR CONTROL WORRYING: MORE THAN HALF THE DAYS

## 2022-01-12 ASSESSMENT — MIFFLIN-ST. JEOR: SCORE: 1450.51

## 2022-01-12 ASSESSMENT — PATIENT HEALTH QUESTIONNAIRE - PHQ9: 5. POOR APPETITE OR OVEREATING: NEARLY EVERY DAY

## 2022-01-12 NOTE — TELEPHONE ENCOUNTER
Patient calling with     Right in center of chest feels like bone pain all the way to her neck and radiates to her upper back between her shoulder blades (5/10)  Intermittent - lasts for 2-3 hours for the last 5-6 days  Has dizziness during these episodes  Took tylenol only once since this pain began  She has not stopped doing her usual activities  98% O2  States that she thinks it could be anxiety but when she had covid about 1 hear ago she had this type of discomfort and was told she had pneumonia.  Patient does not want to go to ED or  tonight.  She does not believe that is necessary and that she only called to make appointment to be seen in clinic.     Patient Denies:  Difficulty breathing/shortness of breath  Fever  Cough  Headache  Nausea or vomiting or sweating    According to the protocol, patient should go to ED now.  Care advice given. Patient verbalizes understanding and states she does not want to go to ED or  tonight but only wants to make an appointment to be seen in clinic tomorrow.  Transferred to scheduling.     Debora Patel RN, Nurse Advisor 7:47 PM 1/11/2022    COVID 19 Nurse Triage Plan/Patient Instructions    Please be aware that novel coronavirus (COVID-19) may be circulating in the community. If you develop symptoms such as fever, cough, or SOB or if you have concerns about the presence of another infection including coronavirus (COVID-19), please contact your health care provider or visit [email or web address].     Disposition/Instructions    ED Visit recommended. Follow protocol based instructions.     Bring Your Own Device:  Please also bring your smart device(s) (smart phones, tablets, laptops) and their charging cables for your personal use and to communicate with your care team during your visit.    Thank you for taking steps to prevent the spread of this virus.  o Limit your contact with others.  o Wear a simple mask to cover your cough.  o Wash your hands well and  "often.    Resources    [facility]: About COVID-19: [email or web address]    CDC: What to Do If You're Sick: www.cdc.gov/coronavirus/2019-ncov/about/steps-when-sick.html    CDC: Ending Home Isolation: www.cdc.gov/coronavirus/2019-ncov/hcp/disposition-in-home-patients.html     CDC: Caring for Someone: www.cdc.gov/coronavirus/2019-ncov/if-you-are-sick/care-for-someone.html     [facility]: Interim Guidance for Hospital Discharge to Home: [email or web address]/[email or web address]/hcp/hospdischarge.pdf    [facility] clinical trials (COVID-19 research studies): [email or web address]     Below are the COVID-19 hotlines at the [place] [facility] ([facility]). Interpreters are available.   o For health questions: Call 165-484-4501 or 1-994.271.8216 (7 a.m. to 7 p.m.)  o For questions about schools and childcare: Call 428-417-9896 or 1-750.148.2891 (7 a.m. to 7 p.m.)       Reason for Disposition    [1] Intermittent  chest pain or \"angina\" AND [2] increasing in severity or frequency  (Exception: pains lasting a few seconds)    Additional Information    Negative: SEVERE chest pain    Negative: Followed a chest injury    Negative: Severe difficulty breathing (e.g., struggling for each breath, speaks in single words)    Negative: Difficult to awaken or acting confused (e.g., disoriented, slurred speech)    Negative: Shock suspected (e.g., cold/pale/clammy skin, too weak to stand, low BP, rapid pulse)    Negative: [1] Chest pain lasts > 5 minutes AND [2] history of heart disease  (i.e., heart attack, bypass surgery, angina, angioplasty, CHF; not just a heart murmur)    Negative: [1] Chest pain lasts > 5 minutes AND [2] described as crushing, pressure-like, or heavy    Negative: [1] Chest pain lasts > 5 minutes AND [2] age > 50    Negative: [1] Chest pain lasts > 5 minutes AND [2] age > 30 AND [3] at least one cardiac risk factor (i.e., hypertension, diabetes, obesity, smoker or " strong family history of heart disease)    Negative: [1] Chest pain lasts > 5 minutes AND [2] not relieved with nitroglycerin    Negative: Passed out (i.e., lost consciousness, collapsed and was not responding)    Negative: Heart beating < 50 beats per minute OR > 140 beats per minute    Negative: Visible sweat on face or sweat dripping down face    Negative: Sounds like a life-threatening emergency to the triager    Protocols used: CHEST PAIN-A-AH

## 2022-01-12 NOTE — PATIENT INSTRUCTIONS
Patient Education     Your Body s Response to Anxiety  Normal anxiety is part of the body s natural defense system. It's an alert to a threat that is unknown, vague, or comes from your own internal fears. While you re in this state, your feelings can range from a vague sense of worry to physical sensations such as a pounding heartbeat. These feelings make you want to react to the threat. An anxiety response is normal in many situations. But when you have an anxiety disorder, the same response can occur at the wrong times.   Anxiety can be helpful  Normal anxiety is a signal from your brain. It warns you of a threat. It's a normal response to help you prevent something. Or to decrease the bad effects of something you can't control. For example, anxiety is a normal response to situations that might harm your body, separate you from a loved one, or lose your job. The symptoms of anxiety can be physical and mental.   How does it feel?  People with anxiety may have:    Dizziness    Muscle tension or pain    Restlessness    Sleeplessness    Trouble focusing    Racing heartbeat    Fast breathing    Shaking or trembling    Stomachache    Diarrhea    Loss of energy    Sweating    Cold, clammy hands    Chest pain    Dry mouth  Anxiety can also be a problem  Anxiety can become a problem when it is hard to control, occurs for months, and interferes with important parts of your life. With an anxiety disorder, your body has the response described above, but in inappropriate ways. The response a person has depends on the anxiety disorder he or she has. With some disorders, the anxiety is way out of proportion to the threat that triggers it. With others, anxiety may occur even when there isn t a clear threat or trigger.   Who does it affect?  Some people are more likely to have lasting anxiety than others. It tends to run in families. And it affects more younger people than older people, and more women than men. But no age, race,  or gender is immune to anxiety problems.   Anxiety can be treated  The good news is that the anxiety that s disrupting your life can be treated. Check with your healthcare provider and rule out any physical problems that may be causing the anxiety symptoms. If an anxiety disorder is diagnosed, seek mental healthcare. This is an illness and it can respond to treatment. Most types of anxiety disorders will respond to talk therapy (counseling) and medicines. Working with your doctor or other healthcare provider, you can develop skills to help you cope with anxiety. You can also gain the perspective you need to overcome your fears. Good sources of support or guidance can be found at your local hospital, mental health clinic, or an employee assistance program.     How to cope with anxiety  Here are some things you can do to cope:    Do what you can.  Keep in mind that you can t control everything. Change what you can. And let the rest take its course.    Exercise. This is a great way to ease tension and help your body feel relaxed.    Stay away from caffeine and nicotine.  These can make anxiety symptoms worse.    Stay sober.  Don't use alcohol or unprescribed medicines. They only make things worse in the long run.    Learn more about anxiety disorders.  Keep track of helpful online resources and books you can use during stressful periods.    Try stress management. Try methods such as meditation.    Talk with others. Think about joining online or in-person support groups.    Get help. Find professional mental health services if your symptoms can't be managed or reduced with the above methods.  [employer] last reviewed this educational content on 4/1/2020 2000-2021 [employer]. All rights reserved. This information is not intended as a substitute for professional medical care. Always follow your healthcare professional's instructions.

## 2022-01-12 NOTE — PROGRESS NOTES
"  Assessment & Plan     (R07.9) Chest pain, unspecified type  (primary encounter diagnosis)  Comment: Differential diagnoses would include: GERD, anxiety, chest wall pain, mass, angina less likely; no evidence of infection, fluid overload, or risk factors for PE   Plan: CBC with platelets and differential,         Comprehensive metabolic panel (BMP + Alb, Alk         Phos, ALT, AST, Total. Bili, TP), XR Chest 2         Views, omeprazole (PRILOSEC) 20 MG DR capsule        Discussed risks and benefits of this medication. Follow up in clinic in 1 week(s) if symptoms persist or sooner for worsening to consider further cardiac evaluation.     (F41.1) JORDYN (generalized anxiety disorder)  Plan: sertraline (ZOLOFT) 50 MG tablet, hydrOXYzine         (VISTARIL) 25 MG capsule        Discussed risks and benefits of this medication as well as other treatment options including therapy and healthy strategies. Follow-up recommended.                BMI:   Estimated body mass index is 32.1 kg/m  as calculated from the following:    Height as of this encounter: 1.63 m (5' 4.17\").    Weight as of this encounter: 85.3 kg (188 lb).   Weight management plan: Discussed healthy diet and exercise guidelines    See Patient Instructions    Return in about 1 week (around 1/19/2022) for chest pain.    Bethany Warner MD  [facility] ALEXIS Paniagua is a 51 year old who presents for the following health issues     HPI     Chest Pain  Onset/Duration: 1 week ago  Description:   Location: entire chest  Character: burning, sharp  Radiation: to her back  Duration: intermittent   Intensity: mild  Progression of Symptoms: same  Accompanying Signs & Symptoms:  Shortness of breath: no  Sweating: no  Nausea/vomiting: no  Lightheadedness: YES  Palpitations: YES  Fever/Chills: no  Cough: no           Heartburn: no  History:   Family history of heart disease: YES  Tobacco use: no  Previous similar symptoms: YES  Precipitating " "factors:   Worse with exertion: YES  Worse with deep breaths: YES           Related to eating: YES- Burning after she eats           Better with burping: no  Alleviating factors:   Therapies tried and outcome: Tylenol    Seen in urgent care yesterday. EKG and records reviewed.         Review of Systems   CONSTITUTIONAL: NEGATIVE for fever, chills, change in weight  INTEGUMENTARY/SKIN: NEGATIVE for worrisome rashes, moles or lesions  EYES: NEGATIVE for vision changes or irritation  ENT/MOUTH: NEGATIVE for ear, mouth and throat problems  RESP: NEGATIVE for significant cough or SOB  BREAST: NEGATIVE for masses, tenderness or discharge  CV: burning generalized chest pain, positional, post prandial, off and on for 1 week   GI: NEGATIVE for nausea, abdominal pain, heartburn, or change in bowel habits  MUSCULOSKELETAL: NEGATIVE for significant arthralgias or myalgia  NEURO: NEGATIVE for weakness, dizziness or paresthesias  ENDOCRINE: NEGATIVE for temperature intolerance, skin/hair changes  HEME: NEGATIVE for bleeding problems  PSYCHIATRIC: anxiety      Objective    /89 (BP Location: Left arm, Patient Position: Sitting, Cuff Size: Adult Large)   Pulse 91   Temp 98.4  F (36.9  C) (Oral)   Ht 1.63 m (5' 4.17\")   Wt 85.3 kg (188 lb)   SpO2 97%   BMI 32.10 kg/m    Body mass index is 32.1 kg/m .  Physical Exam   GENERAL: alert, no distress and obese  EYES: Eyes grossly normal to inspection, PERRL and conjunctivae and sclerae normal  HENT: ear canals and TM's normal, nose and mouth without ulcers or lesions  NECK: no adenopathy, no asymmetry, masses, or scars and thyroid normal to palpation  RESP: lungs clear to auscultation - no rales, rhonchi or wheezes  CV: regular rate and rhythm, normal S1 S2, no S3 or S4, no murmur, click or rub, no peripheral edema    ABDOMEN: soft, nontender, no hepatosplenomegaly, no masses and bowel sounds normal  MS: no gross musculoskeletal defects noted, no edema  NEURO: Normal strength " and tone, mentation intact and speech normal  PSYCH: mentation appears normal, affect flat, anxious, judgement and insight intact and appearance well groomed    Results for orders placed or performed in visit on 01/12/22 (from the past 24 hour(s))   CBC with platelets and differential    Narrative    The following orders were created for panel order CBC with platelets and differential.  Procedure                               Abnormality         Status                     ---------                               -----------         ------                     CBC with platelets and d...[[id number]]                      Final result                 Please view results for these tests on the individual orders.   CBC with platelets and differential   Result Value Ref Range    WBC Count 4.6 4.0 - 11.0 10e3/uL    RBC Count 4.78 3.80 - 5.20 10e6/uL    Hemoglobin 13.9 11.7 - 15.7 g/dL    Hematocrit 42.6 35.0 - 47.0 %    MCV 89 78 - 100 fL    MCH 29.1 26.5 - 33.0 pg    MCHC 32.6 31.5 - 36.5 g/dL    RDW 13.4 10.0 - 15.0 %    Platelet Count 240 150 - 450 10e3/uL    % Neutrophils 55 %    % Lymphocytes 35 %    % Monocytes 9 %    % Eosinophils 1 %    % Basophils 0 %    Absolute Neutrophils 2.5 1.6 - 8.3 10e3/uL    Absolute Lymphocytes 1.6 0.8 - 5.3 10e3/uL    Absolute Monocytes 0.4 0.0 - 1.3 10e3/uL    Absolute Eosinophils 0.0 0.0 - 0.7 10e3/uL    Absolute Basophils 0.0 0.0 - 0.2 10e3/uL

## 2022-01-12 NOTE — TELEPHONE ENCOUNTER
Pt noticed missed call without a message, and called and asked if one of us had called. No notes in the chart and no documentation from schedulers. Pt asked to be connected with scheduling again, which she was.   SCARLET GARCIA RN on 1/11/2022 at 9:14 PM       Reason for Disposition    Caller has already spoken with another triager and has no further questions.    Protocols used: NO CONTACT OR DUPLICATE CONTACT CALL-A-

## 2022-01-13 ASSESSMENT — ANXIETY QUESTIONNAIRES: GAD7 TOTAL SCORE: 17

## 2022-02-14 ENCOUNTER — IMMUNIZATION (OUTPATIENT)
Dept: NURSING | Facility: CLINIC | Age: 52
End: 2022-02-14
Payer: COMMERCIAL

## 2022-02-14 VITALS — OXYGEN SATURATION: 100 % | SYSTOLIC BLOOD PRESSURE: 118 MMHG | DIASTOLIC BLOOD PRESSURE: 83 MMHG | HEART RATE: 72 BPM

## 2022-02-14 DIAGNOSIS — Z23 HIGH PRIORITY FOR 2019-NCOV VACCINE: Primary | ICD-10-CM

## 2022-02-14 PROCEDURE — 0054A COVID-19,PF,PFIZER (12+ YRS): CPT

## 2022-02-14 PROCEDURE — 99207 PR NO CHARGE LOS: CPT

## 2022-02-14 PROCEDURE — 91305 COVID-19,PF,PFIZER (12+ YRS): CPT

## 2022-02-14 NOTE — PROGRESS NOTES
MA reached out to me to assess patient, feeling dizzy after Pfizer booster.   Patient is in chair in exam room.   Says she feels dizzy but easily stood on her own and got up on exam table.   Vitals checked, normal.   She says she did not have breakfast this am.   Provided her with can of apple juice which she drank.   Says she feels better.   States she felt warm down her arm and into her head as soon as the injection was given (left deltoid).   That feeling has resolved now as well.      Denies dizziness or shortness of breath, left clinic ambulating without difficulty.   Says she feels fine and can drive herself home.     Advised she eat something and stay hydrated today.    Patient verbalized understanding of and agreement with plan.    Janelle Serna RN  [facility]

## 2022-04-20 ENCOUNTER — OFFICE VISIT (OUTPATIENT)
Dept: FAMILY MEDICINE | Facility: CLINIC | Age: 52
End: 2022-04-20
Payer: COMMERCIAL

## 2022-04-20 ENCOUNTER — TRANSFERRED RECORDS (OUTPATIENT)
Dept: HEALTH INFORMATION MANAGEMENT | Facility: CLINIC | Age: 52
End: 2022-04-20

## 2022-04-20 VITALS
HEART RATE: 103 BPM | SYSTOLIC BLOOD PRESSURE: 125 MMHG | BODY MASS INDEX: 31.33 KG/M2 | DIASTOLIC BLOOD PRESSURE: 84 MMHG | WEIGHT: 183.5 LBS | TEMPERATURE: 97.9 F

## 2022-04-20 DIAGNOSIS — R22.2 MASS OF SUBCUTANEOUS TISSUE OF BACK: Primary | ICD-10-CM

## 2022-04-20 DIAGNOSIS — M54.9 LEFT-SIDED BACK PAIN, UNSPECIFIED BACK LOCATION, UNSPECIFIED CHRONICITY: ICD-10-CM

## 2022-04-20 PROCEDURE — 99213 OFFICE O/P EST LOW 20 MIN: CPT | Performed by: FAMILY MEDICINE

## 2022-04-20 ASSESSMENT — PAIN SCALES - GENERAL: PAINLEVEL: MILD PAIN (3)

## 2022-04-20 NOTE — PATIENT INSTRUCTIONS
Stretching / range of motion for back     If lump not resolving, see general surgery for consult

## 2022-04-20 NOTE — PROGRESS NOTES
Scott Paniagua is a 52 year old who presents for the following health issues     History of Present Illness       Back Pain:  She presents for follow up of back pain. Patient's back pain is a chronic problem.  Location of back pain:  Left middle of back  Description of back pain: dull ache  Back pain spreads: nowhere    Since patient first noticed back pain, pain is: gradually worsening  Does back pain interfere with her job:  No      She eats 0-1 servings of fruits and vegetables daily.She consumes 0 sweetened beverage(s) daily.She exercises with enough effort to increase her heart rate 9 or less minutes per day.  She exercises with enough effort to increase her heart rate 3 or less days per week.   She is taking medications regularly.        Review of Systems   Pain is on a bump  No trauma  No change in size of bump      No arm or leg pain    Just on left side    No meds used    Sleeps well    Mostly seated work    Housework at home    No exercise          Objective    /84 (BP Location: Left arm, Patient Position: Chair, Cuff Size: Adult Regular)   Pulse 103   Temp 97.9  F (36.6  C) (Temporal)   Wt 83.2 kg (183 lb 8 oz)   Breastfeeding No   BMI 31.33 kg/m    Body mass index is 31.33 kg/m .  Physical Exam    Full physical not done     Mentation and affect are fine    No tremor of speech or extremity    Patient has a small palpable lump on right mid back area  This is tender on firm palpation  Only mild subj soreness on palpation around the area    Range of motion of back fine  No spinal/ rib/ pelvis tenderness    Sensation and strength are normal in both lower extremities.  Negative straight leg raising test bilaterally.  Able get up on heels and toes normally.  No pain on axial loading.          ASSESSMENT / PLAN:  (R22.2) Mass of subcutaneous tissue of back  (primary encounter diagnosis)  Comment: the pain does seem to be centered around the mass even though it is quite small.  Prudent to  get gen surg opinion.  Patient will schedule.   Plan: Adult General Surg Referral             (M54.9) Left-sided back pain, unspecified back location, unspecified chronicity  Comment: stressed an active approach to back pain.  Stretching/ range of motion.  Printed multiple exercises for patient   Plan: as above     Follow up here prn       I reviewed the patient's medications, allergies, medical history, family history, and social history.    Jonny Paz MD

## 2022-05-05 ENCOUNTER — OFFICE VISIT (OUTPATIENT)
Dept: SURGERY | Facility: CLINIC | Age: 52
End: 2022-05-05
Attending: FAMILY MEDICINE
Payer: COMMERCIAL

## 2022-05-05 VITALS
WEIGHT: 183 LBS | SYSTOLIC BLOOD PRESSURE: 140 MMHG | DIASTOLIC BLOOD PRESSURE: 74 MMHG | BODY MASS INDEX: 31.24 KG/M2 | HEART RATE: 74 BPM

## 2022-05-05 DIAGNOSIS — R22.2 MASS OF SUBCUTANEOUS TISSUE OF BACK: ICD-10-CM

## 2022-05-05 PROCEDURE — 99213 OFFICE O/P EST LOW 20 MIN: CPT | Performed by: SURGERY

## 2022-05-05 NOTE — LETTER
5/5/2022         RE: Arcelia Liz  [address]  [place] [address]        Dear Colleague,    Thank you for referring your patient, Arcelia Liz, to the [facility]. Please see a copy of my visit note below.    I was asked to see Arcelia Liz regarding lipoma by Simona Aviles MD    CC: Mass    HPI:  Patient is a 52 year old female with complaints of occasion discomfort associated with her back mass. She hasn't noticed that anything makes it better or worse. The pain seems to come and go. She has no current pain. The patient noticed the symptoms about 2 years ago. The mass has not grown recently.     Patient does not have a personal history of skin problems  Patient does not have a family history of skin cancer        Review Of Systems  10 point ROS neg other than the symptoms noted above in the HPI.      Past Medical History:   Diagnosis Date     Anemia 08/11/2015     Eczema      Fertility problem      JORDYN (generalized anxiety disorder)      H. 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 1990s, completed 9 mo tx inh 11/2010     Obesity        Past Surgical History:   Procedure Laterality Date     COLONOSCOPY  9/6/2012    Procedure: COLONOSCOPY;  COLONOSCOPY, CHRONIC LOWER ABD PAIN;  Surgeon: Jordan Mitchell MD;  Location: [facility] OR     DILATION AND CURETTAGE  2005    MAB, twin gestation     LAPAROSCOPIC CHOLECYSTECTOMY N/A 9/8/2020    Procedure: LAPAROSCOPIC CHOLECYSTECTOMY;  Surgeon: Javier Figueroa DO;  Location: [facility] OR       Social History     Socioeconomic History     Marital status:      Spouse name: Not on file     Number of children: 3     Years of education: Not on file     Highest education level: Not on file   Occupational History     Occupation: [employer]     Employer: [employer]     Comment: filing     Occupation: Nursing Assistant      Occupation: -former   Tobacco Use     Smoking status: Never Smoker     Smokeless tobacco: Never Used     Tobacco comment: Lives in smoke free household   Vaping Use     Vaping Use: Never used   Substance and Sexual Activity     Alcohol use: No     Drug use: No     Sexual activity: Not Currently     Partners: Male     Birth control/protection: Post-menopausal   Other Topics Concern     Parent/sibling w/ CABG, MI or angioplasty before 65F 55M? No   Social History Narrative     Not on file     Social Determinants of Health     Financial Resource Strain: Not on file   Food Insecurity: Not on file   Transportation Needs: Not on file   Physical Activity: Not on file   Stress: Not on file   Social Connections: Not on file   Intimate Partner Violence: Not on file   Housing Stability: Not on file       Current Outpatient Medications   Medication Sig Dispense Refill     acetaminophen (TYLENOL) 325 MG tablet Take 2 tablets (650 mg) by mouth every 6 hours as needed for mild pain Alternate with ibuprofen so you are taking one or the other every three hours. For example take tylenol at 5am, then ibuprofen at 8am, then tylenol at 11am, and so on. 50 tablet 0     hydrOXYzine (VISTARIL) 25 MG capsule Take 1 capsule (25 mg) by mouth 3 times daily as needed for anxiety 60 capsule 0     omeprazole (PRILOSEC) 20 MG DR capsule Take 1 capsule (20 mg) by mouth daily 30 capsule 0     sertraline (ZOLOFT) 50 MG tablet Take 0.5 tablets (25 mg) by mouth daily for 8 days, THEN 1 tablet (50 mg) daily.         Physical exam:   BP (!) 140/74   Pulse 74   Wt 83 kg (183 lb)   BMI 31.24 kg/m      Exam:  Constitutional: healthy, alert and no distress  Eyes: Pupils round and equal, no icterus   ENT: Mucous membranes moist  Respiratory:  Non-labored respiration  Musculoskeletal: No gross deformity  Neurologic: No gross focal deficits  Psychiatric: mentation appears normal and affect normal/bright  Hematologic/Lymphatic/Immunologic: No  bruising noted  Skin: No lesions, rashes, erythema or jaundice noted      Skin:  Lesion at left mid back and is consistent with lipoma. Mobile deep and slightly tender    Assessment: Lipoma   Plan to excise in the office under local.  Patient feels comfortable with this.      The risks benefits and alternatives of removing the mass (either in the office or the surgery center) were discussed with the patient including but not limited to pain, bleeding, infection, risks of general anesthesia (i.e. MI, CVA, PE, and death), and cosmetic deformity. Additionally we discussed the risk of recurrence.    Javier Figueroa, DO        Again, thank you for allowing me to participate in the care of your patient.        Sincerely,        Javier Figueroa, DO

## 2022-05-05 NOTE — PROGRESS NOTES
I was asked to see Arcelia MIGUEL Liz regarding lipoma by Simona Aviles MD    CC: Mass    HPI:  Patient is a 52 year old female with complaints of occasion discomfort associated with her back mass. She hasn't noticed that anything makes it better or worse. The pain seems to come and go. She has no current pain. The patient noticed the symptoms about 2 years ago. The mass has not grown recently.     Patient does not have a personal history of skin problems  Patient does not have a family history of skin cancer        Review Of Systems  10 point ROS neg other than the symptoms noted above in the HPI.      Past Medical History:   Diagnosis Date     Anemia 08/11/2015     Eczema      Fertility problem      JORDYN (generalized anxiety disorder)      H. 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 1990s, completed 9 mo tx inh 11/2010     Obesity        Past Surgical History:   Procedure Laterality Date     COLONOSCOPY  9/6/2012    Procedure: COLONOSCOPY;  COLONOSCOPY, CHRONIC LOWER ABD PAIN;  Surgeon: Jordan Mitchell MD;  Location: [facility] OR     DILATION AND CURETTAGE  2005    MAB, twin gestation     LAPAROSCOPIC CHOLECYSTECTOMY N/A 9/8/2020    Procedure: LAPAROSCOPIC CHOLECYSTECTOMY;  Surgeon: Javier Figueroa DO;  Location: [facility] OR       Social History     Socioeconomic History     Marital status:      Spouse name: Not on file     Number of children: 3     Years of education: Not on file     Highest education level: Not on file   Occupational History     Occupation: [employer]     Employer: [employer]     Comment: filing     Occupation: Nursing Assistant     Occupation: -former   Tobacco Use     Smoking status: Never Smoker     Smokeless tobacco: Never Used     Tobacco comment: Lives in smoke free household   Vaping Use     Vaping Use: Never used   Substance and Sexual Activity     Alcohol  use: No     Drug use: No     Sexual activity: Not Currently     Partners: Male     Birth control/protection: Post-menopausal   Other Topics Concern     Parent/sibling w/ CABG, MI or angioplasty before 65F 55M? No   Social History Narrative     Not on file     Social Determinants of Health     Financial Resource Strain: Not on file   Food Insecurity: Not on file   Transportation Needs: Not on file   Physical Activity: Not on file   Stress: Not on file   Social Connections: Not on file   Intimate Partner Violence: Not on file   Housing Stability: Not on file       Current Outpatient Medications   Medication Sig Dispense Refill     acetaminophen (TYLENOL) 325 MG tablet Take 2 tablets (650 mg) by mouth every 6 hours as needed for mild pain Alternate with ibuprofen so you are taking one or the other every three hours. For example take tylenol at 5am, then ibuprofen at 8am, then tylenol at 11am, and so on. 50 tablet 0     hydrOXYzine (VISTARIL) 25 MG capsule Take 1 capsule (25 mg) by mouth 3 times daily as needed for anxiety 60 capsule 0     omeprazole (PRILOSEC) 20 MG DR capsule Take 1 capsule (20 mg) by mouth daily 30 capsule 0     sertraline (ZOLOFT) 50 MG tablet Take 0.5 tablets (25 mg) by mouth daily for 8 days, THEN 1 tablet (50 mg) daily.         Physical exam:   BP (!) 140/74   Pulse 74   Wt 83 kg (183 lb)   BMI 31.24 kg/m      Exam:  Constitutional: healthy, alert and no distress  Eyes: Pupils round and equal, no icterus   ENT: Mucous membranes moist  Respiratory:  Non-labored respiration  Musculoskeletal: No gross deformity  Neurologic: No gross focal deficits  Psychiatric: mentation appears normal and affect normal/bright  Hematologic/Lymphatic/Immunologic: No bruising noted  Skin: No lesions, rashes, erythema or jaundice noted      Skin:  Lesion at left mid back and is consistent with lipoma. Mobile deep and slightly tender    Assessment: Lipoma   Plan to excise in the office under local.  Patient feels  comfortable with this.      The risks benefits and alternatives of removing the mass (either in the office or the surgery center) were discussed with the patient including but not limited to pain, bleeding, infection, risks of general anesthesia (i.e. MI, CVA, PE, and death), and cosmetic deformity. Additionally we discussed the risk of recurrence.    Javier Figueroa, DO

## 2022-05-17 ENCOUNTER — ANCILLARY PROCEDURE (OUTPATIENT)
Dept: GENERAL RADIOLOGY | Facility: CLINIC | Age: 52
End: 2022-05-17
Attending: PHYSICIAN ASSISTANT
Payer: COMMERCIAL

## 2022-05-17 ENCOUNTER — OFFICE VISIT (OUTPATIENT)
Dept: FAMILY MEDICINE | Facility: CLINIC | Age: 52
End: 2022-05-17
Payer: COMMERCIAL

## 2022-05-17 VITALS
HEIGHT: 64 IN | DIASTOLIC BLOOD PRESSURE: 80 MMHG | OXYGEN SATURATION: 92 % | WEIGHT: 184.2 LBS | SYSTOLIC BLOOD PRESSURE: 118 MMHG | TEMPERATURE: 97.7 F | HEART RATE: 92 BPM | RESPIRATION RATE: 20 BRPM | BODY MASS INDEX: 31.45 KG/M2

## 2022-05-17 DIAGNOSIS — M54.9 LEFT-SIDED BACK PAIN, UNSPECIFIED BACK LOCATION, UNSPECIFIED CHRONICITY: Primary | ICD-10-CM

## 2022-05-17 DIAGNOSIS — R10.2 PELVIC PRESSURE IN FEMALE: ICD-10-CM

## 2022-05-17 DIAGNOSIS — Z12.31 VISIT FOR SCREENING MAMMOGRAM: ICD-10-CM

## 2022-05-17 DIAGNOSIS — R30.0 DYSURIA: ICD-10-CM

## 2022-05-17 DIAGNOSIS — M54.9 LEFT-SIDED BACK PAIN, UNSPECIFIED BACK LOCATION, UNSPECIFIED CHRONICITY: ICD-10-CM

## 2022-05-17 LAB
ALBUMIN SERPL-MCNC: 4 G/DL (ref 3.4–5)
ALBUMIN UR-MCNC: NEGATIVE MG/DL
ALP SERPL-CCNC: 112 U/L (ref 40–150)
ALT SERPL W P-5'-P-CCNC: 23 U/L (ref 0–50)
ANION GAP SERPL CALCULATED.3IONS-SCNC: 6 MMOL/L (ref 3–14)
APPEARANCE UR: CLEAR
AST SERPL W P-5'-P-CCNC: 12 U/L (ref 0–45)
BASOPHILS # BLD AUTO: 0 10E3/UL (ref 0–0.2)
BASOPHILS NFR BLD AUTO: 1 %
BILIRUB SERPL-MCNC: 0.4 MG/DL (ref 0.2–1.3)
BILIRUB UR QL STRIP: NEGATIVE
BUN SERPL-MCNC: 17 MG/DL (ref 7–30)
CALCIUM SERPL-MCNC: 9.7 MG/DL (ref 8.5–10.1)
CHLORIDE BLD-SCNC: 108 MMOL/L (ref 94–109)
CO2 SERPL-SCNC: 27 MMOL/L (ref 20–32)
COLOR UR AUTO: YELLOW
CREAT SERPL-MCNC: 0.59 MG/DL (ref 0.52–1.04)
EOSINOPHIL # BLD AUTO: 0 10E3/UL (ref 0–0.7)
EOSINOPHIL NFR BLD AUTO: 1 %
ERYTHROCYTE [DISTWIDTH] IN BLOOD BY AUTOMATED COUNT: 13.1 % (ref 10–15)
GFR SERPL CREATININE-BSD FRML MDRD: >90 ML/MIN/1.73M2
GLUCOSE BLD-MCNC: 110 MG/DL (ref 70–99)
GLUCOSE UR STRIP-MCNC: NEGATIVE MG/DL
HCT VFR BLD AUTO: 43.1 % (ref 35–47)
HGB BLD-MCNC: 14.3 G/DL (ref 11.7–15.7)
HGB UR QL STRIP: NEGATIVE
KETONES UR STRIP-MCNC: NEGATIVE MG/DL
LEUKOCYTE ESTERASE UR QL STRIP: NEGATIVE
LYMPHOCYTES # BLD AUTO: 1.3 10E3/UL (ref 0.8–5.3)
LYMPHOCYTES NFR BLD AUTO: 31 %
MCH RBC QN AUTO: 29.4 PG (ref 26.5–33)
MCHC RBC AUTO-ENTMCNC: 33.2 G/DL (ref 31.5–36.5)
MCV RBC AUTO: 89 FL (ref 78–100)
MONOCYTES # BLD AUTO: 0.3 10E3/UL (ref 0–1.3)
MONOCYTES NFR BLD AUTO: 8 %
NEUTROPHILS # BLD AUTO: 2.5 10E3/UL (ref 1.6–8.3)
NEUTROPHILS NFR BLD AUTO: 59 %
NITRATE UR QL: NEGATIVE
PH UR STRIP: 5.5 [PH] (ref 5–7)
PLATELET # BLD AUTO: 259 10E3/UL (ref 150–450)
POTASSIUM BLD-SCNC: 4 MMOL/L (ref 3.4–5.3)
PROT SERPL-MCNC: 7.4 G/DL (ref 6.8–8.8)
RBC # BLD AUTO: 4.86 10E6/UL (ref 3.8–5.2)
SODIUM SERPL-SCNC: 141 MMOL/L (ref 133–144)
SP GR UR STRIP: 1.01 (ref 1–1.03)
UROBILINOGEN UR STRIP-ACNC: 0.2 E.U./DL
WBC # BLD AUTO: 4.3 10E3/UL (ref 4–11)

## 2022-05-17 PROCEDURE — 85025 COMPLETE CBC W/AUTO DIFF WBC: CPT | Performed by: PHYSICIAN ASSISTANT

## 2022-05-17 PROCEDURE — 80053 COMPREHEN METABOLIC PANEL: CPT | Performed by: PHYSICIAN ASSISTANT

## 2022-05-17 PROCEDURE — 36415 COLL VENOUS BLD VENIPUNCTURE: CPT | Performed by: PHYSICIAN ASSISTANT

## 2022-05-17 PROCEDURE — 72100 X-RAY EXAM L-S SPINE 2/3 VWS: CPT | Mod: TC | Performed by: RADIOLOGY

## 2022-05-17 PROCEDURE — 99214 OFFICE O/P EST MOD 30 MIN: CPT | Performed by: PHYSICIAN ASSISTANT

## 2022-05-17 PROCEDURE — 81003 URINALYSIS AUTO W/O SCOPE: CPT | Performed by: PHYSICIAN ASSISTANT

## 2022-05-17 ASSESSMENT — PAIN SCALES - GENERAL: PAINLEVEL: MODERATE PAIN (5)

## 2022-05-17 NOTE — PROGRESS NOTES
"      Subjective   Arcelia is a 52 year old who presents for the following health issues     History of Present Illness       Reason for visit:  UTI  Symptom onset:  3-7 days ago    She eats 0-1 servings of fruits and vegetables daily.She consumes 1 sweetened beverage(s) daily.She exercises with enough effort to increase her heart rate 9 or less minutes per day.    She is taking medications regularly.     Some constipation.  No diarrhea.   No vaginal discharge.   No blood in urine or stools.   Some suprapubic pressure/pain    Review of Systems   Constitutional, HEENT, cardiovascular, pulmonary, GI, , musculoskeletal, neuro, skin, endocrine and psych systems are negative, except as otherwise noted.      Objective    /80   Pulse 92   Temp 97.7  F (36.5  C) (Tympanic)   Resp 20   Ht 1.63 m (5' 4.17\")   Wt 83.6 kg (184 lb 3.2 oz)   SpO2 92%   Breastfeeding No   BMI 31.45 kg/m    Body mass index is 31.45 kg/m .  Physical Exam   BACK: Lumbosacral spine area reveals local tenderness.  Painful and reduced LS ROM noted. Straight leg raise is negative DTR's, motor strength and sensation normal, including heel and toe gait.  Perifpheral pulses are palpable.  Hipes and knees have full range of motion without pain.  No abdominal tenderness, mass or organomegaly.  ABDOMEN: mild tenderness in the lower abdomen area, without rebound, guarding, mass or organomegaly. Abdomen is soft and bowel sounds are normal.  CHEST:chest clear to IPPA, no tachypnea, retractions or cyanosis and S1, S2 normal, no murmur, no gallop, rate regular.  Eye exam - right eye normal lid, conjunctiva, cornea, pupil and fundus, left eye normal lid, conjunctiva, cornea, pupil and fundus.  Thyroid not palpable, not enlarged, no nodules detected.  Left low back tenderness with palpation. Small probable lipoma at this location as well.    Arcelia was seen today for urinary problem.    Diagnoses and all orders for this visit:    Left-sided back pain, " unspecified back location, unspecified chronicity  -     XR Lumbar Spine 2/3 Views; Future  -     CBC with platelets and differential; Future  -     Comprehensive metabolic panel (BMP + Alb, Alk Phos, ALT, AST, Total. Bili, TP); Future  -     Comprehensive metabolic panel (BMP + Alb, Alk Phos, ALT, AST, Total. Bili, TP)  -     CBC with platelets and differential    Visit for screening mammogram  -     MA SCREENING DIGITAL BILAT - Future  (s+30); Future    Dysuria  -     UA Macro with Reflex to Micro and Culture - lab collect; Future  -     UA Macro with Reflex to Micro and Culture - lab collect    Pelvic pressure in female  -     US Pelvic Complete with Transvaginal; Future    Other orders  -     REVIEW OF HEALTH MAINTENANCE PROTOCOL ORDERS      Advised supportive and symptomatic treatment.  Follow up with Provider - if condition persists or worsens.

## 2022-05-19 ENCOUNTER — ANCILLARY PROCEDURE (OUTPATIENT)
Dept: ULTRASOUND IMAGING | Facility: CLINIC | Age: 52
End: 2022-05-19
Attending: PHYSICIAN ASSISTANT
Payer: COMMERCIAL

## 2022-05-19 DIAGNOSIS — R10.2 PELVIC PRESSURE IN FEMALE: ICD-10-CM

## 2022-05-19 PROCEDURE — 76856 US EXAM PELVIC COMPLETE: CPT | Mod: GC | Performed by: RADIOLOGY

## 2022-05-19 PROCEDURE — 76830 TRANSVAGINAL US NON-OB: CPT | Mod: GC | Performed by: RADIOLOGY

## 2022-05-20 NOTE — TELEPHONE ENCOUNTER
Milagros Gates is a 67 y.o. male    Chief Complaint   Patient presents with    Follow-up     6 month f/u    Coronary Artery Disease    Hypertension    Cholesterol Problem       Chest pain No    SOB No    Dizziness No    Swelling No    Refills No    Visit Vitals  /64 (BP 1 Location: Left upper arm, BP Patient Position: Sitting)   Pulse 80   Ht 5' 11\" (1.803 m)   Wt 205 lb (93 kg)   SpO2 96%   BMI 28.59 kg/m²       1. Have you been to the ER, urgent care clinic since your last visit? Hospitalized since your last visit? No    2. Have you seen or consulted any other health care providers outside of the [address] [place] [address] since your last visit? Include any pap smears or colon screening.   No Patient is calling for results. Please call back. Thank you.

## 2022-06-17 ENCOUNTER — TELEPHONE (OUTPATIENT)
Dept: FAMILY MEDICINE | Facility: CLINIC | Age: 52
End: 2022-06-17
Payer: COMMERCIAL

## 2022-06-17 DIAGNOSIS — R10.2 PELVIC PAIN IN FEMALE: Primary | ICD-10-CM

## 2022-06-17 RX ORDER — DICLOFENAC SODIUM 75 MG/1
75 TABLET, DELAYED RELEASE ORAL 2 TIMES DAILY PRN
Qty: 30 TABLET | Refills: 0 | Status: SHIPPED | OUTPATIENT
Start: 2022-06-17 | End: 2022-12-20

## 2022-06-17 NOTE — TELEPHONE ENCOUNTER
See note in confidential notes:    Simona Aviles MDPhysician  4:03 PM    Edit                 Noted.   Analgesic Rx sent in the interim.                                I called and spoke to patient and advised her of diclofenac Rx sent, advised on directions.  Patient verbalized understanding of and agreement with plan.    Janelle Serna RN  [facility]

## 2022-06-17 NOTE — TELEPHONE ENCOUNTER
"Patient calling, had US for pelvic pain 5/19/22; I see Adalberto Ramirez advised result was normal.    She says her pain went away after the ultrasound but now has returned for the past 4 days, constant, moderate, denies vaginal bleeding or abdominal pain/distention.    She would like Dr Aviles to review the ultrasound result to see if there might be any issues.   I see ovaries were \"not identified\".    Routed to PCP to address recurrence of pelvic pain, ongoing issue.    Janelle Serna RN  [facility]      "

## 2022-06-17 NOTE — CONFIDENTIAL NOTE
Noted.   Pelvic Ultrasound reviewed- unremarkable.  If patient's symptoms persist/worsen, I recommend to schedule a follow up F2F visit in clinic.

## 2022-06-17 NOTE — TELEPHONE ENCOUNTER
Patient notified of provider's message as written. Patient verbalized understanding. Follow-up appointment has been scheduled for 6/24/22 as patient is having continued pain. States that Tylenol has not been effective in relieving pain and is wondering if there is something Dr. Aviles can prescribe for her pain until patient's appointment. Please advise.     Carolyn Quezada RN   [facility]

## 2022-08-29 ENCOUNTER — NURSE TRIAGE (OUTPATIENT)
Dept: FAMILY MEDICINE | Facility: CLINIC | Age: 52
End: 2022-08-29

## 2022-08-29 ENCOUNTER — OFFICE VISIT (OUTPATIENT)
Dept: FAMILY MEDICINE | Facility: CLINIC | Age: 52
End: 2022-08-29
Payer: COMMERCIAL

## 2022-08-29 VITALS
HEART RATE: 86 BPM | DIASTOLIC BLOOD PRESSURE: 88 MMHG | SYSTOLIC BLOOD PRESSURE: 133 MMHG | HEIGHT: 64 IN | BODY MASS INDEX: 31.38 KG/M2 | TEMPERATURE: 98.5 F | RESPIRATION RATE: 18 BRPM | OXYGEN SATURATION: 99 % | WEIGHT: 183.8 LBS

## 2022-08-29 DIAGNOSIS — R10.11 RUQ ABDOMINAL PAIN: Primary | ICD-10-CM

## 2022-08-29 DIAGNOSIS — R10.13 EPIGASTRIC PAIN: ICD-10-CM

## 2022-08-29 DIAGNOSIS — K59.00 CONSTIPATION, UNSPECIFIED CONSTIPATION TYPE: ICD-10-CM

## 2022-08-29 LAB
ALBUMIN SERPL-MCNC: 4 G/DL (ref 3.4–5)
ALP SERPL-CCNC: 112 U/L (ref 40–150)
ALT SERPL W P-5'-P-CCNC: 20 U/L (ref 0–50)
ANION GAP SERPL CALCULATED.3IONS-SCNC: 8 MMOL/L (ref 3–14)
AST SERPL W P-5'-P-CCNC: 16 U/L (ref 0–45)
BILIRUB SERPL-MCNC: 0.4 MG/DL (ref 0.2–1.3)
BUN SERPL-MCNC: 13 MG/DL (ref 7–30)
CALCIUM SERPL-MCNC: 9.8 MG/DL (ref 8.5–10.1)
CHLORIDE BLD-SCNC: 107 MMOL/L (ref 94–109)
CO2 SERPL-SCNC: 24 MMOL/L (ref 20–32)
CREAT SERPL-MCNC: 0.58 MG/DL (ref 0.52–1.04)
ERYTHROCYTE [DISTWIDTH] IN BLOOD BY AUTOMATED COUNT: 12.5 % (ref 10–15)
GFR SERPL CREATININE-BSD FRML MDRD: >90 ML/MIN/1.73M2
GLUCOSE BLD-MCNC: 89 MG/DL (ref 70–99)
HCT VFR BLD AUTO: 41.2 % (ref 35–47)
HGB BLD-MCNC: 13.6 G/DL (ref 11.7–15.7)
LIPASE SERPL-CCNC: 65 U/L (ref 73–393)
MCH RBC QN AUTO: 29 PG (ref 26.5–33)
MCHC RBC AUTO-ENTMCNC: 33 G/DL (ref 31.5–36.5)
MCV RBC AUTO: 88 FL (ref 78–100)
PLATELET # BLD AUTO: 225 10E3/UL (ref 150–450)
POTASSIUM BLD-SCNC: 4.1 MMOL/L (ref 3.4–5.3)
PROT SERPL-MCNC: 7.3 G/DL (ref 6.8–8.8)
RBC # BLD AUTO: 4.69 10E6/UL (ref 3.8–5.2)
SODIUM SERPL-SCNC: 139 MMOL/L (ref 133–144)
WBC # BLD AUTO: 4.4 10E3/UL (ref 4–11)

## 2022-08-29 PROCEDURE — 85027 COMPLETE CBC AUTOMATED: CPT | Performed by: PHYSICIAN ASSISTANT

## 2022-08-29 PROCEDURE — 80053 COMPREHEN METABOLIC PANEL: CPT | Performed by: PHYSICIAN ASSISTANT

## 2022-08-29 PROCEDURE — 83690 ASSAY OF LIPASE: CPT | Performed by: PHYSICIAN ASSISTANT

## 2022-08-29 PROCEDURE — 36415 COLL VENOUS BLD VENIPUNCTURE: CPT | Performed by: PHYSICIAN ASSISTANT

## 2022-08-29 PROCEDURE — 99214 OFFICE O/P EST MOD 30 MIN: CPT | Performed by: PHYSICIAN ASSISTANT

## 2022-08-29 ASSESSMENT — PAIN SCALES - GENERAL: PAINLEVEL: MILD PAIN (3)

## 2022-08-29 NOTE — TELEPHONE ENCOUNTER
I called and spoke to patient, she notes this is an ongoing issue and not urgent as pain is bearable.   Advised she can opt to go to  if she cannot reliably make scheduled visits.    She says she worked last night and overslept until 7:50 am so not enough time to make it to the visit.    There is another opening with same day provider this afternoon, patient says she will be sure to make it to the 2:20 pm visit with Diamond Calles.   She wants to see if perhaps she needs an MRI ordered to evaluate her ongoing issues.  Scheduled.    Janelle Serna RN  [facility]

## 2022-08-29 NOTE — PROGRESS NOTES
Assessment & Plan     RUQ abdominal pain  Epigastric pain  Constipation, unspecified constipation type  Patient is a 52-year-old female who presents to clinic due to right upper quadrant pain ongoing for the last few weeks.  Patient has been evaluated for this in the past and was told it was related to constipation.  Patient does admit to significant constipation is not currently on medication for management.  Vital signs normal.  Low suspicion for acute abdomen given timeline, normal vital signs, and no rebound or guarding on exam.  Patient does have mild right upper quadrant and right lower quadrant tenderness to palpation.  Will complete labs to ensure no underlying abdominal pathology.  As symptoms improve with bowel movements, they are most likely related to constipation.  Discussed the importance of managing constipation long-term and recommended use of MiraLAX.  Return precautions provided.  - CBC with platelets; Future  - Comprehensive metabolic panel (BMP + Alb, Alk Phos, ALT, AST, Total. Bili, TP); Future  - Lipase; Future  - CBC with platelets  - Comprehensive metabolic panel (BMP + Alb, Alk Phos, ALT, AST, Total. Bili, TP)  - Lipase    See Patient Instructions    Return in about 1 week (around 9/5/2022), or if symptoms worsen or fail to improve.    Diamond Calles PA-C  [facility] YANIRA Paniagua is a 52 year old, presenting for the following health issues:  Abdominal Pain (Ongoing issue)      HPI     Pain History:  When did you first notice your pain? - Acute Pain located to RUQ for the past few weeks. Pain is improving over the last 3 days. She was told this was related to constipation in the past. She was taking medication she ordered online which are vitamins and notes pain started after taking these medications. She is not taking any medication for constipation and notes stools are hard. BM 2 times per week. No history of heart burn.   Have you seen anyone else for your  "pain? Yes -   Where in your body do you have pain? Abdominal/Flank Pain  Onset/Duration: 2 weeks  Description:   Character: Sharp and Dull ache  Location: right upper quadrant left upper quadrant  Radiation:  Back  Intensity: mild  Progression of Symptoms:  same  Accompanying Signs & Symptoms:  Fever/Chills: No  Gas/Bloating: No  Nausea: No  Vomitting: No  Diarrhea: No  Constipation: YES  Dysuria or Hematuria: No  History:   Trauma: NO  Previous similar pain: YES  Previous tests done: none  Precipitating factors:   Does the pain change with:     Food: No    Bowel Movement: YES    Urination: No   Other factors:  No  Therapies tried and outcome: None  No LMP recorded.        Objective    /88   Pulse 86   Temp 98.5  F (36.9  C) (Tympanic)   Resp 18   Ht 1.63 m (5' 4.17\")   Wt 83.4 kg (183 lb 12.8 oz)   SpO2 99%   BMI 31.38 kg/m    Body mass index is 31.38 kg/m .  Physical Exam  Vitals and nursing note reviewed.   Constitutional:       General: She is not in acute distress.     Appearance: Normal appearance.   HENT:      Head: Normocephalic and atraumatic.      Mouth/Throat:      Mouth: Mucous membranes are moist.      Pharynx: Oropharynx is clear.   Eyes:      Extraocular Movements: Extraocular movements intact.      Pupils: Pupils are equal, round, and reactive to light.   Cardiovascular:      Rate and Rhythm: Normal rate and regular rhythm.      Heart sounds: Normal heart sounds.   Pulmonary:      Effort: Pulmonary effort is normal.      Breath sounds: Normal breath sounds.   Abdominal:      General: Bowel sounds are normal.      Palpations: Abdomen is soft.      Tenderness: There is abdominal tenderness (Epigastric>RUQ, RLQ). There is no guarding or rebound.   Musculoskeletal:         General: Normal range of motion.      Cervical back: Normal range of motion.   Skin:     General: Skin is warm and dry.   Neurological:      General: No focal deficit present.      Mental Status: She is alert. "   Psychiatric:         Mood and Affect: Mood normal.         Behavior: Behavior normal.                          .  ..

## 2022-08-29 NOTE — TELEPHONE ENCOUNTER
"Protocol recommends Go To ED/UCC Now (Or To Office With PCP Approval)  Second level triage by Primary Care Provider required.    S: Abdominal Pain    B: Patient did not present for scheduled appointment today.    A: Right side \"under my ribs\" radiates from down and then it comes up.  \"Always present\". Dull but can be sharp.    9/8/2020 had same pain and was told it was her gall bladder. Also told Told she has an enlarged liver.  Had cholecystectomy at that time. Still has appendix.    Had this same pain again one year ago. Did not seek medical attention.    Last 3 days pain is getting better. 3/10 today. Pain gets better after bowel movement.    Last bowel movement: 8/27/2022. \"Very hard\". Been constipated for the past 4-5 years. Treats it by increasing fruits, vegetables, and fluids which helps.    Denies: fever, emesis, GERD, urinary/vaginal issues, abdomen tender to palpation, blood in stool, black stool,    Reason for Disposition    Constant abdominal pain lasting > 2 hours    Additional Information    Negative: Passed out (i.e., fainted, collapsed and was not responding)    Negative: Shock suspected (e.g., cold/pale/clammy skin, too weak to stand, low BP, rapid pulse)    Negative: Sounds like a life-threatening emergency to the triager    Negative: Chest pain    Negative: Pain is mainly in upper abdomen (if needed ask: 'is it mainly above the belly button?')    Negative: Abdominal pain and pregnant < 20 weeks    Negative: Abdominal pain and pregnant 20 or more weeks    Negative: SEVERE abdominal pain (e.g., excruciating)    Negative: Vomiting red blood or black (coffee ground) material    Negative: Bloody, black, or tarry bowel movements  (Exception: Chronic-unchanged black-grey bowel movements and is taking iron pills or Pepto-Bismol.)    Protocols used: ABDOMINAL PAIN - FEMALE-A-OH    R: Protocol recommends Go To ED/UCC Now (Or To Office With PCP Approval)  Second level triage by Primary Care Provider " required.    Merna Shi RN

## 2022-08-29 NOTE — TELEPHONE ENCOUNTER
Noted.   Abdominal pain, 3/10.   Recent no-show noted for appt on 8/2.   Ok to go to UC for evaluation

## 2022-08-29 NOTE — PATIENT INSTRUCTIONS
For further evaluation of your abdominal pain symptoms we are completing lab work today.  We will send results and next steps if needed to [facility].    Your symptoms may be due to constipation.  Please purchase over-the-counter MiraLAX (the generic one is great) and use as recommended on the bottle.  You can use this on and off as needed in the future to manage constipation.    If you develop any severe symptoms such as fevers, vomiting, bloody stools, or severe abdominal pain, please go to the emergency department.  Reach out with questions or concerns.

## 2022-10-29 ENCOUNTER — NURSE TRIAGE (OUTPATIENT)
Dept: NURSING | Facility: CLINIC | Age: 52
End: 2022-10-29

## 2022-10-29 NOTE — TELEPHONE ENCOUNTER
Nausea for one week. No vomiting. Doesn't feel like eating. Burping or gas helps but it occurs off and on. She stated her son  of cancer and it has been stressful since then. She noticed the nausea worsened then. She tried an herbal supplement at that time and found out there are side effects of stomach issues and nausea with that. She stopped taking the supplement but the nausea continues for three days. She will go to urgent care for evaluation.  Tracy Burch RN  [facility]      Reason for Disposition    Nausea lasts > 1 week    Additional Information    Negative: Shock suspected (e.g., cold/pale/clammy skin, too weak to stand, low BP, rapid pulse)    Negative: Sounds like a life-threatening emergency to the triager    Negative: [1] Nausea or vomiting AND [2] pregnancy < 20 weeks    Negative: Menstrual Period - Missed or Late (i.e., pregnancy suspected)    Negative: Heat exhaustion suspected (i.e., dehydration from heat exposure)    Negative: Motion sickness suspected (i.e., nausea with car, plane, boat, or train travel)    Negative: Anxiety or stress suspected (i.e., nausea with anxiety attacks or stressful situations)    Negative: Traumatic Brain Injury (TBI) suspected    Negative: Nausea (or Vomiting) in a cancer patient who is currently (or recently) receiving chemotherapy or radiation therapy, or cancer patient who has metastatic or end-stage cancer and is receiving palliative care    Negative: Vomiting occurs    Negative: Other symptom is present, see that guideline.  (e.g., chest pain, headache, dizziness, abdominal pain, colds, sore throat, etc.).    Negative: Unable to walk, or can only walk with assistance (e.g., requires support)    Negative: Difficulty breathing    Negative: [1] Insulin-dependent diabetes (Type I) AND [2] glucose > 400 mg/dl (22 mmol/l)    Negative: [1] Drinking very little AND [2] dehydration suspected (e.g., no urine > 12 hours, very dry mouth, very  lightheaded)    Negative: Patient sounds very sick or weak to the triager    Negative: Fever > 104 F (40 C)    Negative: [1] Fever > 101 F (38.3 C) AND [2] age > 60 years    Negative: [1] Fever > 100.0 F (37.8 C) AND [2] bedridden (e.g., nursing home patient, CVA, chronic illness, recovering from surgery)    Negative: [1] Fever > 100.0 F (37.8 C) AND [2] diabetes mellitus or weak immune system (e.g., HIV positive, cancer chemo, splenectomy, organ transplant, chronic steroids)    Negative: Taking any of the following medications: digoxin (Lanoxin), lithium, theophylline, phenytoin (Dilantin)    Negative: Yellowish color of the skin or white of the eye (i.e., jaundice)    Negative: Fever present > 3 days (72 hours)    Negative: Receiving cancer chemotherapy medication    Negative: Taking prescription medication that could cause nausea (e.g., narcotics/opiates, antibiotics, OCPs, many others)     On herbal medication, after she took one pill she noticed it. They said some people report stomach discomfort and nausea. Stopped it three days ago. Only took two pills.    Protocols used: NAUSEA-A-AH

## 2022-11-20 ENCOUNTER — HEALTH MAINTENANCE LETTER (OUTPATIENT)
Age: 52
End: 2022-11-20

## 2022-11-30 ENCOUNTER — NURSE TRIAGE (OUTPATIENT)
Dept: FAMILY MEDICINE | Facility: CLINIC | Age: 52
End: 2022-11-30

## 2022-11-30 NOTE — TELEPHONE ENCOUNTER
Headache x 2 weeks off and on. Not worsening.   No obvious cause and no recent illness. Denies pregnancy.   Protocol is to be seen today or tomorrow.  RN advised urgent care today and gave locations and hours.  Patient agrees and will come into urgent care.     Reason for Disposition    Unexplained headache that is present > 24 hours    Additional Information    Negative: Difficult to awaken or acting confused (e.g., disoriented, slurred speech)    Negative: Weakness of the face, arm or leg on one side of the body and new-onset    Negative: Numbness of the face, arm or leg on one side of the body and new-onset    Negative: Loss of speech or garbled speech and new-onset    Negative: Passed out (i.e., fainted, collapsed and was not responding)    Negative: Sounds like a life-threatening emergency to the triager    Negative: Followed a head injury within last 3 days    Negative: Traumatic Brain Injury (TBI) is suspected    Negative: Sinus pain of forehead and yellow or green nasal discharge    Negative: Pregnant    Negative: Unable to walk without falling    Negative: Stiff neck (can't touch chin to chest)    Negative: Possibility of carbon monoxide exposure    Negative: SEVERE headache, states 'worst headache' of life    Negative: SEVERE headache, sudden-onset (i.e., reaching maximum intensity within seconds to 1 hour)    Negative: Severe pain in one eye    Negative: Loss of vision or double vision  (Exception: Same as prior migraines.)    Negative: Patient sounds very sick or weak to the triager    Negative: Fever > 103 F (39.4 C)    Negative: Fever > 100.0 F (37.8 C) and has diabetes mellitus or a weak immune system (e.g., HIV positive, cancer chemotherapy, organ transplant, splenectomy, chronic steroids)    Negative: SEVERE headache (e.g., excruciating) and has had severe headaches before    Negative: SEVERE headache and not relieved by pain meds    Negative: SEVERE headache and vomiting    Negative: SEVERE  "headache and fever    Negative: New headache and weak immune system (e.g., HIV positive, cancer chemo, splenectomy, organ transplant, chronic steroids)    Negative: Fever present > 3 days (72 hours)    Negative: Patient wants to be seen    Answer Assessment - Initial Assessment Questions  1. LOCATION: \"Where does it hurt?\"       Middle on top of head  2. ONSET: \"When did the headache start?\" (Minutes, hours or days)       2 weeks ago  3. PATTERN: \"Does the pain come and go, or has it been constant since it started?\"      Off and on x 2 weeks  4. SEVERITY: \"How bad is the pain?\" and \"What does it keep you from doing?\"  (e.g., Scale 1-10; mild, moderate, or severe)    - MILD (1-3): doesn't interfere with normal activities     - MODERATE (4-7): interferes with normal activities or awakens from sleep     - SEVERE (8-10): excruciating pain, unable to do any normal activities         4/10 currently  5. RECURRENT SYMPTOM: \"Have you ever had headaches before?\" If Yes, ask: \"When was the last time?\" and \"What happened that time?\"       Previous HA 2 weeks ago  6. CAUSE: \"What do you think is causing the headache?\"      Not sure cause  7. MIGRAINE: \"Have you been diagnosed with migraine headaches?\" If Yes, ask: \"Is this headache similar?\"       Migraine history unsure as a provider in the past hinted to her that she might have had a migraine  8. HEAD INJURY: \"Has there been any recent injury to the head?\"       No injury recently  9. OTHER SYMPTOMS: \"Do you have any other symptoms?\" (fever, stiff neck, eye pain, sore throat, cold symptoms)      No other symptoms  10. PREGNANCY: \"Is there any chance you are pregnant?\" \"When was your last menstrual period?\"        no    Protocols used: HEADACHE-A-OH    Carly Morales BSN, RN    "

## 2022-12-01 ENCOUNTER — OFFICE VISIT (OUTPATIENT)
Dept: URGENT CARE | Facility: URGENT CARE | Age: 52
End: 2022-12-01
Payer: COMMERCIAL

## 2022-12-01 ENCOUNTER — ANCILLARY PROCEDURE (OUTPATIENT)
Dept: CT IMAGING | Facility: CLINIC | Age: 52
End: 2022-12-01
Attending: FAMILY MEDICINE
Payer: COMMERCIAL

## 2022-12-01 ENCOUNTER — ANCILLARY PROCEDURE (OUTPATIENT)
Dept: GENERAL RADIOLOGY | Facility: CLINIC | Age: 52
End: 2022-12-01
Attending: FAMILY MEDICINE
Payer: COMMERCIAL

## 2022-12-01 ENCOUNTER — OFFICE VISIT (OUTPATIENT)
Dept: PEDIATRICS | Facility: CLINIC | Age: 52
End: 2022-12-01
Payer: COMMERCIAL

## 2022-12-01 VITALS
SYSTOLIC BLOOD PRESSURE: 123 MMHG | TEMPERATURE: 98.3 F | BODY MASS INDEX: 30.73 KG/M2 | DIASTOLIC BLOOD PRESSURE: 80 MMHG | RESPIRATION RATE: 16 BRPM | HEART RATE: 107 BPM | OXYGEN SATURATION: 100 % | WEIGHT: 180 LBS

## 2022-12-01 VITALS
RESPIRATION RATE: 18 BRPM | SYSTOLIC BLOOD PRESSURE: 145 MMHG | OXYGEN SATURATION: 99 % | DIASTOLIC BLOOD PRESSURE: 99 MMHG | TEMPERATURE: 98.4 F | HEART RATE: 110 BPM

## 2022-12-01 DIAGNOSIS — F41.9 ANXIETY: ICD-10-CM

## 2022-12-01 DIAGNOSIS — G44.219 EPISODIC TENSION-TYPE HEADACHE, NOT INTRACTABLE: Primary | ICD-10-CM

## 2022-12-01 DIAGNOSIS — G44.219 EPISODIC TENSION-TYPE HEADACHE, NOT INTRACTABLE: ICD-10-CM

## 2022-12-01 DIAGNOSIS — R51.9 ACUTE INTRACTABLE HEADACHE, UNSPECIFIED HEADACHE TYPE: Primary | ICD-10-CM

## 2022-12-01 DIAGNOSIS — G62.9 NEUROPATHY: ICD-10-CM

## 2022-12-01 DIAGNOSIS — Z87.828 HISTORY OF TRAUMATIC INJURY OF HEAD: ICD-10-CM

## 2022-12-01 DIAGNOSIS — R42 DIZZINESS: ICD-10-CM

## 2022-12-01 LAB
ANION GAP SERPL CALCULATED.3IONS-SCNC: 7 MMOL/L (ref 3–14)
BASOPHILS # BLD AUTO: 0 10E3/UL (ref 0–0.2)
BASOPHILS NFR BLD AUTO: 1 %
BUN SERPL-MCNC: 11 MG/DL (ref 7–30)
CALCIUM SERPL-MCNC: 9.8 MG/DL (ref 8.5–10.1)
CHLORIDE BLD-SCNC: 109 MMOL/L (ref 94–109)
CO2 SERPL-SCNC: 27 MMOL/L (ref 20–32)
CREAT SERPL-MCNC: 0.66 MG/DL (ref 0.52–1.04)
EOSINOPHIL # BLD AUTO: 0 10E3/UL (ref 0–0.7)
EOSINOPHIL NFR BLD AUTO: 1 %
ERYTHROCYTE [DISTWIDTH] IN BLOOD BY AUTOMATED COUNT: 13.4 % (ref 10–15)
ERYTHROCYTE [SEDIMENTATION RATE] IN BLOOD BY WESTERGREN METHOD: 9 MM/HR (ref 0–30)
FERRITIN SERPL-MCNC: 78 NG/ML (ref 8–252)
FOLATE SERPL-MCNC: 16 NG/ML (ref 4.6–34.8)
GFR SERPL CREATININE-BSD FRML MDRD: >90 ML/MIN/1.73M2
GLUCOSE BLD-MCNC: 113 MG/DL (ref 70–99)
HCT VFR BLD AUTO: 43.6 % (ref 35–47)
HGB BLD-MCNC: 14.4 G/DL (ref 11.7–15.7)
IMM GRANULOCYTES # BLD: 0 10E3/UL
IMM GRANULOCYTES NFR BLD: 0 %
LYMPHOCYTES # BLD AUTO: 1.4 10E3/UL (ref 0.8–5.3)
LYMPHOCYTES NFR BLD AUTO: 29 %
MCH RBC QN AUTO: 28.5 PG (ref 26.5–33)
MCHC RBC AUTO-ENTMCNC: 33 G/DL (ref 31.5–36.5)
MCV RBC AUTO: 86 FL (ref 78–100)
MONOCYTES # BLD AUTO: 0.4 10E3/UL (ref 0–1.3)
MONOCYTES NFR BLD AUTO: 8 %
NEUTROPHILS # BLD AUTO: 2.9 10E3/UL (ref 1.6–8.3)
NEUTROPHILS NFR BLD AUTO: 61 %
NRBC # BLD AUTO: 0 10E3/UL
NRBC BLD AUTO-RTO: 0 /100
PLATELET # BLD AUTO: 297 10E3/UL (ref 150–450)
POTASSIUM BLD-SCNC: 4.5 MMOL/L (ref 3.4–5.3)
RBC # BLD AUTO: 5.05 10E6/UL (ref 3.8–5.2)
SODIUM SERPL-SCNC: 143 MMOL/L (ref 133–144)
WBC # BLD AUTO: 4.7 10E3/UL (ref 4–11)

## 2022-12-01 PROCEDURE — 82746 ASSAY OF FOLIC ACID SERUM: CPT | Performed by: FAMILY MEDICINE

## 2022-12-01 PROCEDURE — 36415 COLL VENOUS BLD VENIPUNCTURE: CPT | Performed by: FAMILY MEDICINE

## 2022-12-01 PROCEDURE — 82607 VITAMIN B-12: CPT | Performed by: FAMILY MEDICINE

## 2022-12-01 PROCEDURE — 85025 COMPLETE CBC W/AUTO DIFF WBC: CPT | Performed by: FAMILY MEDICINE

## 2022-12-01 PROCEDURE — 82728 ASSAY OF FERRITIN: CPT | Performed by: FAMILY MEDICINE

## 2022-12-01 PROCEDURE — 99214 OFFICE O/P EST MOD 30 MIN: CPT | Performed by: FAMILY MEDICINE

## 2022-12-01 PROCEDURE — 80048 BASIC METABOLIC PNL TOTAL CA: CPT | Performed by: FAMILY MEDICINE

## 2022-12-01 PROCEDURE — 72040 X-RAY EXAM NECK SPINE 2-3 VW: CPT | Performed by: RADIOLOGY

## 2022-12-01 PROCEDURE — 70450 CT HEAD/BRAIN W/O DYE: CPT | Performed by: RADIOLOGY

## 2022-12-01 PROCEDURE — 85652 RBC SED RATE AUTOMATED: CPT | Performed by: FAMILY MEDICINE

## 2022-12-01 RX ORDER — DIPHENHYDRAMINE HCL 25 MG
25 CAPSULE ORAL ONCE
Status: COMPLETED | OUTPATIENT
Start: 2022-12-01 | End: 2022-12-01

## 2022-12-01 RX ADMIN — Medication 25 MG: at 12:30

## 2022-12-01 ASSESSMENT — PAIN SCALES - GENERAL
PAINLEVEL: MODERATE PAIN (4)
PAINLEVEL: MODERATE PAIN (4)

## 2022-12-01 NOTE — PROGRESS NOTES
Assessment & Plan     Neuropathy  - ESR: Erythrocyte sedimentation rate  - Ferritin  - Vitamin B12  - Folate  - CBC with platelets and differential  - Basic metabolic panel  (Ca, Cl, CO2, Creat, Gluc, K, Na, BUN)  - XR Cervical Spine 2/3 Views  - XR Cervical Spine 2/3 Views  - Basic metabolic panel  (Ca, Cl, CO2, Creat, Gluc, K, Na, BUN)  - CBC with platelets and differential  - Folate  - Vitamin B12  - Ferritin  - ESR: Erythrocyte sedimentation rate    Episodic tension-type headache, not intractable  - CT Head w/o Contrast     After initial interview and examination patient reports that she has anxiety of being in the healthcare system given her 's history of cancer 5 years ago where he passed away he she does not want to be informed of her results she was very specific that she also would not want a phone call and only wants a very abbreviated [employer] message about what is the next step to be done. Based on my initial impression/exam and history taking I feel she is safe for discharge to home after her test are performed  We discussed should any of her symptoms worsen she needs to be seen immediately, she verbally agrees/acknowledges;  at this time can continue over-the-counter analgesics for headaches, this appear to be in my opinion to be a tension headache based on pattern/distribution.     Initial blood work does not reveal any causes of this intermittent neuropathy - Hx of low/normal ferritin levels although Hgb in the normal range --  consider iron supplementation at f/u appointment.      Xray of the neck does not reveal degenerative disc disease or malalignment    No neuro deficits appreciated on exam or review of history.    Hx, exam and lab work suggests against temporal arteritis.       Anxiety  Patient requested a medication prior to imaging given she will be driving herself home a mild dose of 25mg benadryl was provided to her -- she understands that mild sedation can occur with this medication,  would like to proceed.       Jaspal Schmitt MD   [facility]    Scott Paniagua is a 52 year old female who presents to clinic today for the following health issues:  Chief Complaint   Patient presents with     Headache     Dizziness     HPI  1)  She recalls an event 2020 where a heavy vase/jar fell onto of her head and from this event she did not go in for evaluation-- has discomfort at the apex of her head that still comes and goes every 3-4 days.     She reports recent illness which was respiratory happened 2 weeks ago. She did not get tested for COVID. Her last COVID infection which was confirmed was 2020.   No shortness of breath or chest pain. No ongoing cough. She has no ongoing nasal discharge. Has not had facial pressure.   No ongoing fever    She has a headache of her bilateral temple area in addition to the apex      Normal vision. No body numbness. No early hx of stroke or prior hx of stroke.      #dizziness -- resolved not currently present for 5 minutes at a time. Started 2 months ago. No visual difficulties.       2)   Patient then mentions she has bilateral hand numbness which has never been assessed. This started about a year ago .   No hx of neck injuries.   Numbness of bilateral arms with heaviness/numbness she reports. Has not seen a physician for this issue.   No FH of neuro issues.   A day or two of numbness    She has no known hx of vitamin deficiencies. She is not diabetic.       Patient Active Problem List    Diagnosis Date Noted     Obesity 01/12/2022     Priority: Medium     JORDYN (generalized anxiety disorder) 01/12/2022     Priority: Medium       Current Outpatient Medications   Medication     acetaminophen (TYLENOL) 325 MG tablet     diclofenac (VOLTAREN) 75 MG EC tablet     hydrOXYzine (VISTARIL) 25 MG capsule     omeprazole (PRILOSEC) 20 MG DR capsule     sertraline (ZOLOFT) 50 MG tablet     Current Facility-Administered Medications   Medication     diphenhydrAMINE  (BENADRYL) capsule 25 mg     Facility-Administered Medications Ordered in Other Visits   Medication     sodium chloride (PF) 0.9% PF flush 57 mL           Objective    BP (!) 145/99 (BP Location: Left arm, Patient Position: Sitting, Cuff Size: Adult Regular)   Pulse 110   Temp 98.4  F (36.9  C) (Oral)   Resp 18   SpO2 99%   Physical Exam   GEN: NAD,   CV: HDS  Pulm: non-labored  Neuro: rombergs negative, no ataxia, normal coordination, EOMI  MSK: normal shoulder ROM bilaterally, 5/5 biceps flexion    Results for orders placed or performed in visit on 12/01/22   CT Head w/o Contrast     Status: None    Narrative    EXAM: CT HEAD W/O CONTRAST  12/1/2022 1:44 PM     HISTORY:  chronic headaches, also reports at vertex of scalp prior  injury where she has recurrent intermittent headaches. normal neuro  exam; Episodic tension-type headache, not intractable       COMPARISON:  No prior similar studies    TECHNIQUE: Using multidetector thin collimation helical acquisition  technique, axial, coronal and sagittal CT images from the skull base  to the vertex were obtained without intravenous contrast.   (topogram) image(s) also obtained and reviewed.    FINDINGS:  No intracranial hemorrhage, mass effect, or midline shift. No acute  loss of gray-white matter differentiation in the cerebral hemispheres.  Ventricles are proportionate to the cerebral sulci. Clear basal  cisterns.    The bony calvaria and the bones of the skull base are normal. The  visualized portions of the paranasal sinuses and mastoid air cells are  clear. Grossly normal orbits.       Impression    IMPRESSION: No acute intracranial pathology.     MEDARDO HENLEY MD         SYSTEM ID:  [id number]   Results for orders placed or performed in visit on 12/01/22   XR Cervical Spine 2/3 Views     Status: None    Narrative    Two-view cervical spine.    COMPARISON: 9/1/2005.    HISTORY: Neuropathy    FINDINGS: C1-T2 are visualized on the lateral image. The  alignment is  maintained. The disc heights and vertebral body heights are  maintained. There is mild facet arthropathy, left greater than right.  The prevertebral soft tissues are unremarkable.      Impression    IMPRESSION: Mild cervical spondylosis. No fracture or subluxation  visualized.    CARLOS MONGE MD         SYSTEM ID:  [id number]   Basic metabolic panel  (Ca, Cl, CO2, Creat, Gluc, K, Na, BUN)     Status: Abnormal   Result Value Ref Range    Sodium 143 133 - 144 mmol/L    Potassium 4.5 3.4 - 5.3 mmol/L    Chloride 109 94 - 109 mmol/L    Carbon Dioxide (CO2) 27 20 - 32 mmol/L    Anion Gap 7 3 - 14 mmol/L    Urea Nitrogen 11 7 - 30 mg/dL    Creatinine 0.66 0.52 - 1.04 mg/dL    Calcium 9.8 8.5 - 10.1 mg/dL    Glucose 113 (H) 70 - 99 mg/dL    GFR Estimate >90 >60 mL/min/1.73m2   Ferritin     Status: Normal   Result Value Ref Range    Ferritin 78 8 - 252 ng/mL   ESR: Erythrocyte sedimentation rate     Status: Normal   Result Value Ref Range    Erythrocyte Sedimentation Rate 9 0 - 30 mm/hr   CBC with platelets and differential     Status: None   Result Value Ref Range    WBC Count 4.7 4.0 - 11.0 10e3/uL    RBC Count 5.05 3.80 - 5.20 10e6/uL    Hemoglobin 14.4 11.7 - 15.7 g/dL    Hematocrit 43.6 35.0 - 47.0 %    MCV 86 78 - 100 fL    MCH 28.5 26.5 - 33.0 pg    MCHC 33.0 31.5 - 36.5 g/dL    RDW 13.4 10.0 - 15.0 %    Platelet Count 297 150 - 450 10e3/uL    % Neutrophils 61 %    % Lymphocytes 29 %    % Monocytes 8 %    % Eosinophils 1 %    % Basophils 1 %    % Immature Granulocytes 0 %    NRBCs per 100 WBC 0 <1 /100    Absolute Neutrophils 2.9 1.6 - 8.3 10e3/uL    Absolute Lymphocytes 1.4 0.8 - 5.3 10e3/uL    Absolute Monocytes 0.4 0.0 - 1.3 10e3/uL    Absolute Eosinophils 0.0 0.0 - 0.7 10e3/uL    Absolute Basophils 0.0 0.0 - 0.2 10e3/uL    Absolute Immature Granulocytes 0.0 <=0.4 10e3/uL    Absolute NRBCs 0.0 10e3/uL   CBC with platelets and differential     Status: None    Narrative    The following orders were  created for panel order CBC with platelets and differential.  Procedure                               Abnormality         Status                     ---------                               -----------         ------                     CBC with platelets and d...[[id number]]                      Final result                 Please view results for these tests on the individual orders.                     The use of Dragon/Tagent services may have been used to construct the content in this note; any grammatical or spelling errors are non-intentional. Please contact the author of this note directly if you are in need of any clarification.

## 2022-12-01 NOTE — PROGRESS NOTES
"    Scott Paniagua is a 52 year old presenting for the following health issues:  Headache and Dizziness      HPI Patient states that she has had a frontal HA and dizziness intermediatley for the past 2 weeks. She reports that she has had this before. She explains that she has has HA all her life and never has been DX with migraines. HX of head trauma 2020 but no LOC.     Pain History:  When did you first notice your pain? - Acute Pain   Have you seen anyone else for your pain? Yes -  Where in your body do you have pain? Headache  Onset/Duration: 2 weeks   Description  Location: unilateral in the bilateral frontal area   Character: dull pain  Frequency:  Intermediate   Duration:  Day   Wake with headaches: YES  Able to do daily activities when headache present: YES  Intensity:  moderate  Progression of Symptoms:  same and intermittent  Accompanying signs and symptoms:  Stiff neck: No  Neck or upper back pain: YES \"sometimes yes\"   Sinus or URI symptoms YES \"for the past 2 weeks\"   Fever: No  Nausea or vomiting: No  Dizziness: YES \"I feel unsteady, like I'm falling to the sides unbalanced\" No LOC   Numbness/tingling: No \"but 3-4 months ago I had numbness to both arms\"   Weakness: No  Visual changes: none  History  Head trauma: YES \"something fell on my head 2020\"   Family history of migraines: N/A  Daily pain medication use: YES \"when I get the HA I use Tylenol Ibuprofen\"   Previous tests for headaches: No  Neurologist evaluation: No  Precipitating or Alleviating factors (light/sound/sleep/caffeine): None   Therapies tried and outcome: Ibuprofen (Advil, Motrin) and Tylenol              Outcome - effective  Frequent/daily pain medication use: No          "

## 2022-12-01 NOTE — PROGRESS NOTES
"Addendum 1230- Patient reports that she is feeling very anxious due to her  dying of [place] 2017. Her HR was elevated upon intal assessment, RN requested Benadryl 25 mg for patient. MD placed order. Patient reports that the Benadryl helped relieve her \"anxiety.\"    Naomi MARTINEZ RN, BSN   [facility]     "

## 2022-12-01 NOTE — PROGRESS NOTES
Chief complaint: headache and dizziness    Was grabbing something from the cabinet 2020 there was a glass jar very heavy and full of spice and went straight on the vertex of her skull    Since then would have pain here and there on the top of her head    Also has had history of dizziness mild around the same time when she got hit in the head and that went away    But the past 2 weeks dizziness has started and eprsistent  Patient having earache  And also pain in the vertex of her skull   Pain is rated about 4/10    Dizziness- when she gets up feels like she would fall on the side and feels unbalanced  Comes and goes - would have dizziness would be triggered when she gets out of bed - would take an hour or two then settle down   Chest pain or palpitation: No  Syncope or presyncope: No  Motor,sensory weakness, or slurring of speech: No  Headache: Yes  Blurring of vision : No  Nausea: YES  Vomiting: No  Abdominal pain: No     Had cold since the past week   No tylenol or ibuprofen     Tried supportive treatment  At home no relief  Additional Information: above    Problem list and histories reviewed & adjusted, as indicated.  Additional history: as documented    Problem list, Medication list, Allergies, and Medical/Social/Surgical histories reviewed in [facility] and updated as appropriate.    ROS:  Constitutional, HEENT, cardiovascular, pulmonary, gi and gu systems are negative, except as otherwise noted.    OBJECTIVE:                                                    /80   Pulse 107   Temp 98.3  F (36.8  C) (Tympanic)   Resp 16   Wt 81.6 kg (180 lb)   SpO2 100%   BMI 30.73 kg/m    Body mass index is 30.73 kg/m .  GENERAL: healthy, alert and no distress  EYES: Eyes grossly normal to inspection, PERRL and conjunctivae and sclerae normal  HENT: ear canals and TM's normal, nose and mouth without ulcers or lesions  NECK: no adenopathy, no asymmetry, masses, or scars and thyroid normal to palpation  RESP: lungs clear  to auscultation - no rales, rhonchi or wheezes  CV: regular rate and rhythm, normal S1 S2, no S3 or S4, no murmur, click or rub, no peripheral edema and peripheral pulses strong  ABDOMEN: soft, nontender, no hepatosplenomegaly, no masses and bowel sounds normal  MS: no gross musculoskeletal defects noted, no edema  SKIN: no suspicious lesions or rashes  NEURO: Normal strength and tone, mentation intact and speech normal  PSYCH: mentation appears normal, affect normal/bright    Diagnostic Test Results:  none      ASSESSMENT/PLAN:                                                        ICD-10-CM    1. Acute intractable headache, unspecified headache type  R51.9       2. Dizziness  R42       3. History of traumatic injury of head  Z87.828         Normal neuro exam  Dizziness possible vertigo  However given history of traumatic injury when headaches started and acute worsening recommend [facility] evaluation today  Dr. Schmitt accepted transfer  MD Jessi Taylor MD  [facility] [place]

## 2022-12-02 LAB — VIT B12 SERPL-MCNC: 960 PG/ML (ref 232–1245)

## 2022-12-20 ENCOUNTER — OFFICE VISIT (OUTPATIENT)
Dept: FAMILY MEDICINE | Facility: CLINIC | Age: 52
End: 2022-12-20
Payer: COMMERCIAL

## 2022-12-20 VITALS
BODY MASS INDEX: 31.31 KG/M2 | DIASTOLIC BLOOD PRESSURE: 80 MMHG | WEIGHT: 183.4 LBS | OXYGEN SATURATION: 100 % | HEART RATE: 121 BPM | SYSTOLIC BLOOD PRESSURE: 132 MMHG | TEMPERATURE: 97.4 F

## 2022-12-20 DIAGNOSIS — R07.89 CHEST TIGHTNESS: Primary | ICD-10-CM

## 2022-12-20 DIAGNOSIS — F41.1 GAD (GENERALIZED ANXIETY DISORDER): ICD-10-CM

## 2022-12-20 DIAGNOSIS — F41.9 ANXIETY: ICD-10-CM

## 2022-12-20 PROCEDURE — 99214 OFFICE O/P EST MOD 30 MIN: CPT | Performed by: PHYSICIAN ASSISTANT

## 2022-12-20 PROCEDURE — 36415 COLL VENOUS BLD VENIPUNCTURE: CPT | Performed by: PHYSICIAN ASSISTANT

## 2022-12-20 PROCEDURE — 84484 ASSAY OF TROPONIN QUANT: CPT | Performed by: PHYSICIAN ASSISTANT

## 2022-12-20 RX ORDER — ESCITALOPRAM OXALATE 10 MG/1
10 TABLET ORAL DAILY
Qty: 60 TABLET | Refills: 0 | Status: SHIPPED | OUTPATIENT
Start: 2022-12-20 | End: 2023-01-03

## 2022-12-20 ASSESSMENT — ENCOUNTER SYMPTOMS
WHEEZING: 0
CHILLS: 0
COUGH: 0
FEVER: 0
LIGHT-HEADEDNESS: 0
CHEST TIGHTNESS: 1
NERVOUS/ANXIOUS: 0
SHORTNESS OF BREATH: 0
DIZZINESS: 1

## 2022-12-20 ASSESSMENT — PAIN SCALES - GENERAL: PAINLEVEL: NO PAIN (0)

## 2022-12-20 NOTE — PATIENT INSTRUCTIONS
For further evaluation of your chest pain as we are completing a troponin.  As discussed, this cannot be done rapidly in clinic and completing this in clinic can delay your care.  If you have any worsening symptoms such as worsening chest pain, you need to go to the emergency department for a more rapid evaluation.    Your symptoms may be related to anxiety.  To help with anxiety you have been prescribed Lexapro.  It can take 4 to 6 weeks for this medication to have full effect.  You can also use prescribed hydroxyzine for anxiety.  We have placed a referral for counseling.  The scheduling team will reach out to you to set up your appointment.  Please reach out with any questions or concerns.  Make sure to schedule a follow-up visit with Dr. Aviles in approximately 4 weeks for a recheck.

## 2022-12-20 NOTE — PROGRESS NOTES
Assessment & Plan     Chest tightness  Anxiety  Patient is a 52-year-old female who presents to clinic for follow-up on 2 recent urgent care visits.  The first 1 she was evaluated for headache and dizziness.  Lab work and CT did not show any worrisome findings.  During second visit, she was evaluated for chest tightness and anxiety.  EKG showed sinus tachycardia.  Patient notes 3 weeks of intermittent chest tightness as well as an increase in anxiety.  She notes anxiety has been present since the death of her  in 2017, but has increased significantly recently.  She also notes that presenting to medical facilities causes an increase in anxiety.  Vital signs significant for tachycardia.  Physical exam without acute abnormalities.    Shared decision making completed.  Patient would like troponin completed to ensure no underlying cardiac pathology.  Discussed that completing this in clinic could result in a delay in patient's care.  Patient verbalized understanding and wishes to proceed with testing.  She notes that she is not having chest pain at this moment, but did have it yesterday.     Discussed role of anxiety and symptoms.  Patient is agreeable to starting daily medication to help with anxiety/depression and start therapy.  Lexapro prescribed and referral placed.  Discussed intermittent use of hydroxyzine if needed.  Discussed symptoms are not urgent/emergent follow-up.  Recommended follow-up with PCP in 4 weeks for reevaluation.      - Troponin T, High Sensitivity; Future  - escitalopram (LEXAPRO) 10 MG tablet; Take 1 tablet (10 mg) by mouth daily  - Adult Mental Health  Referral; Future    See Patient Instructions    Return in about 4 weeks (around 1/17/2023) for Routine Visit with primary care provider .    Diamond Calles PA-C  [facility] YANIRA Paniagua is a 52 year old, presenting for the following health issues:  Hospital F/U      [facility]     ED/UC  Followup:    Facility:  [facility]  Date of visit: 12/19  Reason for visit: chest tightness  Current Status: no change    Patient notes around 3 weeks of chest tightness, dizziness, sharp pain at lateral aspect of left ribs, and anxiety that lasts about 30 minutes. Patient gets this to go away with prayer. She notes significant anxiety since  passed away in 2017, She notes anxiety has worsened in the last 3 weeks. Patient notes that when she starts becoming anxious her heart rate will go up. She is worried about death when she goes to the hospital or clinic.  Patient notes no chest pain at the moment and would like to have troponin completed.    Per chart review, patient evaluated in urgent care 1 day ago due to chest tightness ongoing x2 weeks.  Patient had history of anxiety and noted feeling very anxious as well as experiencing rapid heart rate.  Pain located to sternal area and stable x2 weeks.  Intermittent dizziness x2 weeks.  No severe headache.  EKG completed and showing sinus tachycardia.  ER visit for complete cardiac evaluation was discussed with patient and patient declined.  Clinic follow-up recommended.    Patient was also evaluated 12/1/22 at [facility] due to headache and dizziness. CT head completed:  IMPRESSION: No acute intracranial pathology.       Review of Systems   Constitutional: Negative for chills and fever.   Respiratory: Positive for chest tightness. Negative for cough, shortness of breath and wheezing.         No NICOLE   Cardiovascular: Negative for chest pain.   Skin: Negative for rash.   Neurological: Positive for dizziness. Negative for light-headedness.   Psychiatric/Behavioral: The patient is not nervous/anxious.             Objective    /80 (BP Location: Right arm, Cuff Size: Adult Large)   Pulse (!) 121   Temp 97.4  F (36.3  C) (Tympanic)   Wt 83.2 kg (183 lb 6.4 oz)   SpO2 100%   BMI 31.31 kg/m    Body mass index is 31.31 kg/m .  Physical Exam  Vitals and  nursing note reviewed.   Constitutional:       General: She is not in acute distress.     Appearance: Normal appearance.   HENT:      Head: Normocephalic and atraumatic.   Eyes:      Extraocular Movements: Extraocular movements intact.      Pupils: Pupils are equal, round, and reactive to light.   Cardiovascular:      Rate and Rhythm: Normal rate and regular rhythm.      Heart sounds: Normal heart sounds. No murmur heard.  Pulmonary:      Effort: Pulmonary effort is normal. No respiratory distress.      Breath sounds: Normal breath sounds. No wheezing, rhonchi or rales.   Musculoskeletal:         General: Normal range of motion.      Cervical back: Normal range of motion.   Skin:     General: Skin is warm and dry.   Neurological:      General: No focal deficit present.      Mental Status: She is alert.      Motor: No weakness.      Coordination: Coordination normal.      Gait: Gait normal.   Psychiatric:         Mood and Affect: Mood normal.         Behavior: Behavior normal.         Thought Content: Thought content normal.         Judgment: Judgment normal.

## 2022-12-21 LAB — TROPONIN T SERPL HS-MCNC: 7 NG/L

## 2022-12-21 RX ORDER — HYDROXYZINE PAMOATE 25 MG/1
CAPSULE ORAL
Qty: 60 CAPSULE | Refills: 0 | Status: SHIPPED | OUTPATIENT
Start: 2022-12-21

## 2022-12-23 DIAGNOSIS — F41.1 GAD (GENERALIZED ANXIETY DISORDER): ICD-10-CM

## 2022-12-26 ENCOUNTER — TELEPHONE (OUTPATIENT)
Dept: FAMILY MEDICINE | Facility: CLINIC | Age: 52
End: 2022-12-26

## 2022-12-26 NOTE — TELEPHONE ENCOUNTER
Patient reports being seen for chest tightness (appears patient was seen 12/20/22 with Diamond Calles)  And left hip pain that she is relating to stomach pains. Patient reports that she is usually constipated but does not believe this is the problem right now.   Last BM was formed, mildly hard to push. She again states she does not believe she is constipated because she has been eating fruits. Associates this pain to having more issues with chest tightness.     She reports she was prescribed anxiety medication hydrOXYzine (VISTARIL) 25 MG capsule and escitalopram (LEXAPRO) 10 MG tablet.   Reports this is not working. She is aware the Lexapro can take several weeks to feel effects.     She is concerned and wants to have an MRI to rule anything out.     She was also seen on 12/19/22 in urgent care for same situation.     Diamond Calles out of office today, patient notified. Routing to PCP and Diamond Calles.  RN urged stabbing/sever chest pain to use ER. Patient verbalized acknowledgment.       Minerva Perez RN on 12/26/2022 at 12:12 PM

## 2022-12-27 NOTE — TELEPHONE ENCOUNTER
Haven't seen this patient in over 2 years.   Last scheduled visit in 8/22 was a No-show.   Please have her schedule an office visit to address concerns below at her earliest convenience. Thanks

## 2022-12-27 NOTE — TELEPHONE ENCOUNTER
Patient returned call and RN assisted with scheduling with Dr. Aviles on 1/3/22 at 10:40 pm. (follow up regarding chest tightness related to anxiety)     RN encouraged ER with Sharp/stabbing chest pain or SOB prior to scheduled appointment.   Patient verbalized acknowledgment    ....      RN followed up with hip pain with provider recommendations. Patient declines pain being in hip. She reports pain between upper hip and lower ribs. (Not hip itself) RN asked if she would like to schedule for a follow up for this. She declined and stated she can wait until her appointment next week.       Minerva Perez RN on 12/27/2022 at 10:47 AM

## 2022-12-27 NOTE — TELEPHONE ENCOUNTER
Patient was not evaluated for hip pain at visit. She needs PCP follow up for ongoing care. Otherwise, same day or urgent care if symptoms are worsening rapidly.     Diamond Calles PA-C on 12/27/2022 at 7:48 AM

## 2022-12-27 NOTE — TELEPHONE ENCOUNTER
Haven't seen patient in 2 years. Please clarify what medications she's on. Is she on Sertraline or Lexapro? Please confirm dosages as well.   Recommend a follow up appointment.

## 2022-12-27 NOTE — TELEPHONE ENCOUNTER
Called [phone] (home) and    [phone] (Cell Phone)     Did they answer the phone: No, left a message on voicemail to return call to the [facility] at [phone].    Margret RN,BSN  Triage Nurse  [facility]: [facility]

## 2022-12-27 NOTE — TELEPHONE ENCOUNTER
Called [phone] (home) and    [phone] (Cell Phone)     Did they answer the phone: No, left a message on voicemail to return call to the [facility] at [phone].    Margret RN,BSN  Triage Nurse  [facility]: [facility]

## 2023-01-03 ENCOUNTER — ANCILLARY PROCEDURE (OUTPATIENT)
Dept: GENERAL RADIOLOGY | Facility: CLINIC | Age: 53
End: 2023-01-03
Attending: FAMILY MEDICINE
Payer: COMMERCIAL

## 2023-01-03 ENCOUNTER — OFFICE VISIT (OUTPATIENT)
Dept: FAMILY MEDICINE | Facility: CLINIC | Age: 53
End: 2023-01-03
Payer: COMMERCIAL

## 2023-01-03 VITALS
TEMPERATURE: 97 F | SYSTOLIC BLOOD PRESSURE: 134 MMHG | BODY MASS INDEX: 30.7 KG/M2 | RESPIRATION RATE: 18 BRPM | OXYGEN SATURATION: 98 % | HEART RATE: 87 BPM | DIASTOLIC BLOOD PRESSURE: 86 MMHG | WEIGHT: 179.8 LBS

## 2023-01-03 DIAGNOSIS — F41.1 GENERALIZED ANXIETY DISORDER WITH PANIC ATTACKS: Primary | ICD-10-CM

## 2023-01-03 DIAGNOSIS — Z12.11 SCREEN FOR COLON CANCER: ICD-10-CM

## 2023-01-03 DIAGNOSIS — Z11.59 NEED FOR HEPATITIS C SCREENING TEST: ICD-10-CM

## 2023-01-03 DIAGNOSIS — R07.89 CHEST TIGHTNESS: ICD-10-CM

## 2023-01-03 DIAGNOSIS — F41.0 GENERALIZED ANXIETY DISORDER WITH PANIC ATTACKS: Primary | ICD-10-CM

## 2023-01-03 PROCEDURE — 99214 OFFICE O/P EST MOD 30 MIN: CPT | Performed by: FAMILY MEDICINE

## 2023-01-03 PROCEDURE — 86803 HEPATITIS C AB TEST: CPT | Performed by: FAMILY MEDICINE

## 2023-01-03 PROCEDURE — 71046 X-RAY EXAM CHEST 2 VIEWS: CPT | Mod: TC | Performed by: RADIOLOGY

## 2023-01-03 PROCEDURE — 36415 COLL VENOUS BLD VENIPUNCTURE: CPT | Performed by: FAMILY MEDICINE

## 2023-01-03 RX ORDER — ESCITALOPRAM OXALATE 10 MG/1
10 TABLET ORAL DAILY
Qty: 60 TABLET | Refills: 0 | Status: CANCELLED | OUTPATIENT
Start: 2023-01-03

## 2023-01-03 ASSESSMENT — ANXIETY QUESTIONNAIRES
GAD7 TOTAL SCORE: 17
IF YOU CHECKED OFF ANY PROBLEMS ON THIS QUESTIONNAIRE, HOW DIFFICULT HAVE THESE PROBLEMS MADE IT FOR YOU TO DO YOUR WORK, TAKE CARE OF THINGS AT HOME, OR GET ALONG WITH OTHER PEOPLE: SOMEWHAT DIFFICULT
GAD7 TOTAL SCORE: 17
7. FEELING AFRAID AS IF SOMETHING AWFUL MIGHT HAPPEN: NEARLY EVERY DAY
3. WORRYING TOO MUCH ABOUT DIFFERENT THINGS: NEARLY EVERY DAY
1. FEELING NERVOUS, ANXIOUS, OR ON EDGE: NEARLY EVERY DAY
2. NOT BEING ABLE TO STOP OR CONTROL WORRYING: NEARLY EVERY DAY
5. BEING SO RESTLESS THAT IT IS HARD TO SIT STILL: MORE THAN HALF THE DAYS
6. BECOMING EASILY ANNOYED OR IRRITABLE: NOT AT ALL

## 2023-01-03 ASSESSMENT — PAIN SCALES - GENERAL: PAINLEVEL: NO PAIN (0)

## 2023-01-03 ASSESSMENT — PATIENT HEALTH QUESTIONNAIRE - PHQ9
5. POOR APPETITE OR OVEREATING: NEARLY EVERY DAY
SUM OF ALL RESPONSES TO PHQ QUESTIONS 1-9: 13

## 2023-01-03 NOTE — PROGRESS NOTES
Assessment & Plan     Generalized anxiety disorder with panic attacks, uncontrolled.  - PHQ-9/JORDYN 7 completed, see below/Epic for details    - Adult Mental Health  Referral  - Start: sertraline (ZOLOFT) 50 MG tablet  Dispense: 30 tablet; Refill: 1    Chest tightness- suspect due to anxiety reaction, costochondritis  - XR Chest 2 Views  - Recent EKG done at an outside facility ([facility])      Need for hepatitis C screening test  - Hepatitis C Screen Reflex to HCV RNA Quant and Genotype    Screen for colon cancer  - Fecal colorectal cancer screen (FIT)        Depression Screening Follow Up    PHQ 1/3/2023   PHQ-9 Total Score 13   Q9: Thoughts of better off dead/self-harm past 2 weeks Not at all     Last PHQ-9 1/3/2023   1.  Little interest or pleasure in doing things 3   2.  Feeling down, depressed, or hopeless 3   3.  Trouble falling or staying asleep, or sleeping too much 0   4.  Feeling tired or having little energy 2   5.  Poor appetite or overeating 2   6.  Feeling bad about yourself 0   7.  Trouble concentrating 2   8.  Moving slowly or restless 1   Q9: Thoughts of better off dead/self-harm past 2 weeks 0   PHQ-9 Total Score 13   Difficulty at work, home, or with people -       Follow Up Actions Taken  Crisis resource information provided in After Visit Summary  Mental Health Referral placed     Patient education and Handout with home care instructions given. See AVS for details.      Return in about 1 month (around 2/3/2023) for Medication check, Virtual Visit..    Simona Aviles MD  [facility] YANIRA Paniagua is a 52 year old, presenting for the following health issues:  Anxiety and Depression      HPI     Depression and Anxiety Follow-Up  Was previously on Sertraline a year ago- took it for a about a month, it helped and she noticed improvement in the symptoms but then discontinued it. States that the symptoms got worse again about 3 weeks ago- with pain  attacks every other day with physical symptoms too include chest tightness with tenderness to palpation over the chest wall area, dizziness, shakiness.   Records reveal that she was seen at [facility] ER on 12/19/2022 with same symptoms, EKG done was unremarkable.   Had an  follow up visit here in clinic on 12/20/2022 seen by Diamond Calles with chest tightness and anxiety. Started on Lexapro but states that she never picked it up or took it.     How are you doing with your depression since your last visit? No change    How are you doing with your anxiety since your last visit?  No change    Are you having other symptoms that might be associated with depression or anxiety? Yes:  chest tightness, numbness in hands and dizziness when she is having anxiety     Have you had a significant life event? No     Do you have any concerns with your use of alcohol or other drugs? No    Social History     Tobacco Use     Smoking status: Never     Smokeless tobacco: Never     Tobacco comments:     Lives in smoke free household   Vaping Use     Vaping Use: Never used   Substance Use Topics     Alcohol use: No     Drug use: No     PHQ 12/8/2017 5/10/2019 1/3/2023   PHQ-9 Total Score 2 1 13   Q9: Thoughts of better off dead/self-harm past 2 weeks Not at all Not at all Not at all     JORDYN-7 SCORE 5/10/2019 1/12/2022 1/3/2023   Total Score 4 17 17     Last PHQ-9 1/3/2023   1.  Little interest or pleasure in doing things 3   2.  Feeling down, depressed, or hopeless 3   3.  Trouble falling or staying asleep, or sleeping too much 0   4.  Feeling tired or having little energy 2   5.  Poor appetite or overeating 2   6.  Feeling bad about yourself 0   7.  Trouble concentrating 2   8.  Moving slowly or restless 1   Q9: Thoughts of better off dead/self-harm past 2 weeks 0   PHQ-9 Total Score 13   Difficulty at work, home, or with people -     JORDYN-7  1/3/2023   1. Feeling nervous, anxious, or on edge 3   2. Not being able to stop or control  worrying 3   3. Worrying too much about different things 3   4. Trouble relaxing 3   5. Being so restless that it is hard to sit still 2   6. Becoming easily annoyed or irritable 0   7. Feeling afraid, as if something awful might happen 3   JORDYN-7 Total Score 17   If you checked any problems, how difficult have they made it for you to do your work, take care of things at home, or get along with other people? Somewhat difficult       Suicide Assessment Five-step Evaluation and Treatment (SAFE-T)        Review of Systems   Constitutional, HEENT, cardiovascular, pulmonary, gi and gu systems are negative, except as otherwise noted.      Objective    /86   Pulse 87   Temp 97  F (36.1  C) (Tympanic)   Resp 18   Wt 81.6 kg (179 lb 12.8 oz)   SpO2 98%   BMI 30.70 kg/m    Body mass index is 30.7 kg/m .  Physical Exam   GENERAL: healthy, alert and no distress  RESP: lungs clear to auscultation - no rales, rhonchi or wheezes  CV: regular rate and rhythm, normal S1 S2, no S3 or S4, no murmur, click or rub, no peripheral edema and peripheral pulses strong  PSYCH: mentation appears normal, anxious, judgement and insight intact and appearance well groomed    DATA   Recent labs reviewed.

## 2023-01-04 LAB — HCV AB SERPL QL IA: NONREACTIVE

## 2023-02-09 ENCOUNTER — VIRTUAL VISIT (OUTPATIENT)
Dept: FAMILY MEDICINE | Facility: CLINIC | Age: 53
End: 2023-02-09
Payer: COMMERCIAL

## 2023-02-09 DIAGNOSIS — F41.1 GENERALIZED ANXIETY DISORDER WITH PANIC ATTACKS: ICD-10-CM

## 2023-02-09 DIAGNOSIS — F41.0 GENERALIZED ANXIETY DISORDER WITH PANIC ATTACKS: ICD-10-CM

## 2023-02-09 LAB — HEMOCCULT STL QL IA: NEGATIVE

## 2023-02-09 PROCEDURE — 99213 OFFICE O/P EST LOW 20 MIN: CPT | Mod: VID | Performed by: FAMILY MEDICINE

## 2023-02-09 PROCEDURE — 82274 ASSAY TEST FOR BLOOD FECAL: CPT | Performed by: FAMILY MEDICINE

## 2023-02-09 ASSESSMENT — ANXIETY QUESTIONNAIRES
GAD7 TOTAL SCORE: 4
7. FEELING AFRAID AS IF SOMETHING AWFUL MIGHT HAPPEN: SEVERAL DAYS
5. BEING SO RESTLESS THAT IT IS HARD TO SIT STILL: NOT AT ALL
GAD7 TOTAL SCORE: 4
3. WORRYING TOO MUCH ABOUT DIFFERENT THINGS: SEVERAL DAYS
1. FEELING NERVOUS, ANXIOUS, OR ON EDGE: SEVERAL DAYS
2. NOT BEING ABLE TO STOP OR CONTROL WORRYING: SEVERAL DAYS
IF YOU CHECKED OFF ANY PROBLEMS ON THIS QUESTIONNAIRE, HOW DIFFICULT HAVE THESE PROBLEMS MADE IT FOR YOU TO DO YOUR WORK, TAKE CARE OF THINGS AT HOME, OR GET ALONG WITH OTHER PEOPLE: SOMEWHAT DIFFICULT
6. BECOMING EASILY ANNOYED OR IRRITABLE: NOT AT ALL

## 2023-02-09 ASSESSMENT — PATIENT HEALTH QUESTIONNAIRE - PHQ9
5. POOR APPETITE OR OVEREATING: NOT AT ALL
SUM OF ALL RESPONSES TO PHQ QUESTIONS 1-9: 2

## 2023-02-09 NOTE — PROGRESS NOTES
Arcelia is a 52 year old who is being evaluated via a billable video visit.      How would you like to obtain your AVS? MyChart  If the video visit is dropped, the invitation should be resent by: Text to cell phone: [phone]  Will anyone else be joining your video visit? No      Assessment & Plan     Generalized anxiety disorder with panic attacks, much improved  -  PHQ-9/JORDYN 7 completed, see below/Epic for details    - Refill: sertraline (ZOLOFT) 50 MG tablet  Dispense: 90 tablet; Refill: 1  - Recommended to discontinue the additional Escitalopram that was prescribed in the past.   - Re-evaluate in a month      Patient reporting pelvic pain with discharge- recommended to schedule an in-person visit for evaluation as soon as poosible         Return in about 1 month (around 3/9/2023) for Medication check, Virtual Visit..    Simona Aviles MD  [facility] YANIRA Paniagua is a 52 year old, presenting for the following health issues:  Depression      HPI     Depression and Anxiety Follow-Up  Recently prescribed Sertraline 50 mg/day.   States that she also decided to take left over Escitalopram from a previous provider- not currently on med list.     How are you doing with your depression since your last visit? Improved     How are you doing with your anxiety since your last visit?  Improved     Are you having other symptoms that might be associated with depression or anxiety? No    Have you had a significant life event? No     Do you have any concerns with your use of alcohol or other drugs? No    Social History     Tobacco Use     Smoking status: Never     Smokeless tobacco: Never     Tobacco comments:     Lives in smoke free household   Vaping Use     Vaping Use: Never used   Substance Use Topics     Alcohol use: No     Drug use: No     PHQ 5/10/2019 1/3/2023 2/9/2023   PHQ-9 Total Score 1 13 2   Q9: Thoughts of better off dead/self-harm past 2 weeks Not at all Not at all Not at all      JORDYN-7 SCORE 1/12/2022 1/3/2023 2/9/2023   Total Score 17 17 4     Last PHQ-9 2/9/2023   1.  Little interest or pleasure in doing things 0   2.  Feeling down, depressed, or hopeless 0   3.  Trouble falling or staying asleep, or sleeping too much 0   4.  Feeling tired or having little energy 1   5.  Poor appetite or overeating 1   6.  Feeling bad about yourself 0   7.  Trouble concentrating 0   8.  Moving slowly or restless 0   Q9: Thoughts of better off dead/self-harm past 2 weeks 0   PHQ-9 Total Score 2   Difficulty at work, home, or with people Somewhat difficult     JORDYN-7  2/9/2023   1. Feeling nervous, anxious, or on edge 1   2. Not being able to stop or control worrying 1   3. Worrying too much about different things 1   4. Trouble relaxing 0   5. Being so restless that it is hard to sit still 0   6. Becoming easily annoyed or irritable 0   7. Feeling afraid, as if something awful might happen 1   JORDYN-7 Total Score 4   If you checked any problems, how difficult have they made it for you to do your work, take care of things at home, or get along with other people? Somewhat difficult       Suicide Assessment Five-step Evaluation and Treatment (SAFE-T)      How many servings of fruits and vegetables do you eat daily?  0-1    On average, how many sweetened beverages do you drink each day (Examples: soda, juice, sweet tea, etc.  Do NOT count diet or artificially sweetened beverages)?   0    How many days per week do you exercise enough to make your heart beat faster? 3 or less    How many minutes a day do you exercise enough to make your heart beat faster? 9 or less    How many days per week do you miss taking your medication? 0        Review of Systems   Constitutional, HEENT, cardiovascular, pulmonary, gi and gu systems are negative, except as otherwise noted.      Objective           Vitals:  No vitals were obtained today due to virtual visit.    Physical Exam   GENERAL: Healthy, alert and no distress  EYES:  Eyes grossly normal to inspection.  No discharge or erythema, or obvious scleral/conjunctival abnormalities.  RESP: No audible wheeze, cough, or visible cyanosis.  No visible retractions or increased work of breathing.    SKIN: Visible skin clear. No significant rash, abnormal pigmentation or lesions.  NEURO: Cranial nerves grossly intact.  Mentation and speech appropriate for age.  PSYCH: Mentation appears normal, affect normal/bright, judgement and insight intact, normal speech and appearance well-groomed.          Video-Visit Details    Type of service:  Video Visit   Video Start Time: 11:20 am   Video End Time:11:35 am     Originating Location (pt. Location): Home  Distant Location (provider location):  On-site  Platform used for Video Visit: Dogi

## 2023-03-29 NOTE — TELEPHONE ENCOUNTER
Please call pt.  First titer was inconclusive for mumps immunity, second test showed pt is not immune.  She can either have another MMR vaccine, or she can go to her school to sign her refusal for the vaccine.  Some people do not convert to immunity as expected or require further dosing or never will convert to immunity.  NAKUL Joseph, FNP-BC     no concerns

## 2023-04-18 ENCOUNTER — OFFICE VISIT (OUTPATIENT)
Dept: FAMILY MEDICINE | Facility: CLINIC | Age: 53
End: 2023-04-18
Payer: COMMERCIAL

## 2023-04-18 VITALS
WEIGHT: 176 LBS | TEMPERATURE: 97.3 F | SYSTOLIC BLOOD PRESSURE: 149 MMHG | OXYGEN SATURATION: 99 % | BODY MASS INDEX: 30.05 KG/M2 | HEIGHT: 64 IN | HEART RATE: 90 BPM | RESPIRATION RATE: 16 BRPM | DIASTOLIC BLOOD PRESSURE: 98 MMHG

## 2023-04-18 DIAGNOSIS — R39.89 BLADDER PAIN: ICD-10-CM

## 2023-04-18 DIAGNOSIS — R10.30 LOWER ABDOMINAL PAIN: Primary | ICD-10-CM

## 2023-04-18 LAB
ALBUMIN UR-MCNC: NEGATIVE MG/DL
APPEARANCE UR: CLEAR
BACTERIA #/AREA URNS HPF: ABNORMAL /HPF
BILIRUB UR QL STRIP: NEGATIVE
CLUE CELLS: ABNORMAL
COLOR UR AUTO: YELLOW
GLUCOSE UR STRIP-MCNC: NEGATIVE MG/DL
HGB UR QL STRIP: NEGATIVE
KETONES UR STRIP-MCNC: NEGATIVE MG/DL
LEUKOCYTE ESTERASE UR QL STRIP: ABNORMAL
MUCOUS THREADS #/AREA URNS LPF: PRESENT /LPF
NITRATE UR QL: NEGATIVE
PH UR STRIP: 7.5 [PH] (ref 5–7)
RBC #/AREA URNS AUTO: ABNORMAL /HPF
SP GR UR STRIP: 1.01 (ref 1–1.03)
SQUAMOUS #/AREA URNS AUTO: ABNORMAL /LPF
TRICHOMONAS, WET PREP: ABNORMAL
UROBILINOGEN UR STRIP-ACNC: 0.2 E.U./DL
WBC #/AREA URNS AUTO: ABNORMAL /HPF
WBC'S/HIGH POWER FIELD, WET PREP: ABNORMAL
YEAST, WET PREP: ABNORMAL

## 2023-04-18 PROCEDURE — 99213 OFFICE O/P EST LOW 20 MIN: CPT | Mod: 25 | Performed by: NURSE PRACTITIONER

## 2023-04-18 PROCEDURE — 90472 IMMUNIZATION ADMIN EACH ADD: CPT | Performed by: NURSE PRACTITIONER

## 2023-04-18 PROCEDURE — 81001 URINALYSIS AUTO W/SCOPE: CPT | Performed by: NURSE PRACTITIONER

## 2023-04-18 PROCEDURE — 87086 URINE CULTURE/COLONY COUNT: CPT | Performed by: NURSE PRACTITIONER

## 2023-04-18 PROCEDURE — 90750 HZV VACC RECOMBINANT IM: CPT | Performed by: NURSE PRACTITIONER

## 2023-04-18 PROCEDURE — 90471 IMMUNIZATION ADMIN: CPT | Performed by: NURSE PRACTITIONER

## 2023-04-18 PROCEDURE — 90715 TDAP VACCINE 7 YRS/> IM: CPT | Performed by: NURSE PRACTITIONER

## 2023-04-18 PROCEDURE — 87210 SMEAR WET MOUNT SALINE/INK: CPT | Performed by: NURSE PRACTITIONER

## 2023-04-18 ASSESSMENT — PAIN SCALES - GENERAL: PAINLEVEL: NO PAIN (0)

## 2023-04-18 NOTE — NURSING NOTE
Prior to immunization administration, verified patients identity using patient s name and date of birth. Please see Immunization Activity for additional information.     Screening Questionnaire for Adult Immunization    Are you sick today?   No   Do you have allergies to medications, food, a vaccine component or latex?   No   Have you ever had a serious reaction after receiving a vaccination?   No   Do you have a long-term health problem with heart, lung, kidney, or metabolic disease (e.g., diabetes), asthma, a blood disorder, no spleen, complement component deficiency, a cochlear implant, or a spinal fluid leak?  Are you on long-term aspirin therapy?   No   Do you have cancer, leukemia, HIV/AIDS, or any other immune system problem?   No   Do you have a parent, brother, or sister with an immune system problem?   No   In the past 3 months, have you taken medications that affect  your immune system, such as prednisone, other steroids, or anticancer drugs; drugs for the treatment of rheumatoid arthritis, Crohn s disease, or psoriasis; or have you had radiation treatments?   No   Have you had a seizure, or a brain or other nervous system problem?   No   During the past year, have you received a transfusion of blood or blood    products, or been given immune (gamma) globulin or antiviral drug?   No   For women: Are you pregnant or is there a chance you could become       pregnant during the next month?   No   Have you received any vaccinations in the past 4 weeks?   No     Immunization questionnaire answers were all negative.      Injection of tdap & shingles  given by Cat Marvin CMA. Patient instructed to remain in clinic for 15 minutes afterwards, and to report any adverse reactions.     Screening performed by Cat Marvin CMA on 4/18/2023 at 10:34 AM.

## 2023-04-18 NOTE — PROGRESS NOTES
"  Assessment & Plan     Lower abdominal pain  Believe her pain is bladder pain- reports pain worse with full bladder, alleviated by urinating.  Does not always have symptoms, none today.  This has been 2 separate instances in the last year, both lasting a few weeks.    Pelvic ultrasound normal in May 2022 with first episode of pain.    Today wet prep negative. UA with some mild abnormalities, will wait for culture given no symptoms today.    Discussed avoiding holding her bladder- states she frequently does not have time to urinate at her job.  Discussed avoiding bladder irritants, has been drinking acidic beverages.   Further plan pending lab results.  Referral placed to urology in the event she develops symptoms again.    - UA Microscopic with Reflex to Culture  - Urine Culture    Bladder pain  See above.     - Adult Urology  Referral; Future       BMI:   Estimated body mass index is 30.05 kg/m  as calculated from the following:    Height as of this encounter: 1.63 m (5' 4.17\").    Weight as of this encounter: 79.8 kg (176 lb).   Weight management plan: not addressed today        Valorie Hall [facility]   Arcelia is a 53 year old, presenting for the following health issues:  Pelvic Pain      History of Present Illness       Reason for visit:  Pelvic pain    She eats 0-1 servings of fruits and vegetables daily.She consumes 1 sweetened beverage(s) daily.She exercises with enough effort to increase her heart rate 9 or less minutes per day.  She exercises with enough effort to increase her heart rate 3 or less days per week.   She is taking medications regularly.       Having some low midline abdominal pain.  No pain today.   Has had 2 episodes.  First time was May 2022- this lasted about 3 weeks.  No treatment, eventually just went away. Was seen at that time, had normal UA and pelvic ultrasound.   Had another episode about 3 weeks ago, again lasted several " "weeks. Pain gone now.    Pain is when her bladder is full.  Pain is alleviated by urinating.  Admits has to go long periods without urinating at work.  Remembers she was drinking grapefruit drinks around the time she developed pain in both instances.  No fever.     No concern for STDs.  She states she does have a history of genital herpes many years ago.  No vaginal pain.  Sometimes some clear vaginal discharge.    No menstrual period for the last 8 months.    Some intermittent constipation.        Review of Systems   Constitutional, HEENT, cardiovascular, pulmonary, gi and gu systems are negative, except as otherwise noted.      Objective    BP (!) 149/98   Pulse 90   Temp 97.3  F (36.3  C) (Tympanic)   Resp 16   Ht 1.63 m (5' 4.17\")   Wt 79.8 kg (176 lb)   LMP  (LMP Unknown)   SpO2 99%   BMI 30.05 kg/m    Body mass index is 30.05 kg/m .  Physical Exam   GENERAL: healthy, alert and no distress  RESP: lungs clear to auscultation - no rales, rhonchi or wheezes  CV: regular rate and rhythm, normal S1 S2, no S3 or S4, no murmur, click or rub, no peripheral edema and peripheral pulses strong  ABDOMEN: soft, nontender, no hepatosplenomegaly, no masses and bowel sounds normal  MS: no gross musculoskeletal defects noted, no edema  SKIN: no suspicious lesions or rashes    UA RESULTS:  Recent Labs   Lab Test 04/18/23  1038   COLOR Yellow   APPEARANCE Clear   URINEGLC Negative   URINEBILI Negative   URINEKETONE Negative   SG 1.015   UBLD Negative   URINEPH 7.5*   PROTEIN Negative   UROBILINOGEN 0.2   NITRITE Negative   LEUKEST Trace*   RBCU 0-2   WBCU 10-25*     Wet prep - Clinic Collect  Order: [id number]   Status: Final result      Visible to patient: Yes (not seen)      Dx: Lower abdominal pain     Specimen Information: Vagina; Swab    0 Result Notes          Component Ref Range & Units 10:24 AM    Trichomonas Absent Absent     Yeast Absent Absent     Clue Cells Absent Absent     WBCs/high power field None " 3+ Abnormal

## 2023-04-18 NOTE — PATIENT INSTRUCTIONS
I will be in touch with lab results.     Avoid holding your bladder for long periods of time.     Avoid bladder irritants such as citrus/acidic, caffeine, carbonated beverages and spicy foods.      Referral to urology.

## 2023-04-20 LAB — BACTERIA UR CULT: NO GROWTH

## 2023-04-30 ENCOUNTER — NURSE TRIAGE (OUTPATIENT)
Dept: NURSING | Facility: CLINIC | Age: 53
End: 2023-04-30
Payer: COMMERCIAL

## 2023-04-30 NOTE — TELEPHONE ENCOUNTER
Nurse Triage SBAR    Situation:   -ongoing pelvic pain    Background:   -Patient calling, It is okay to leave a detailed message at this number.     Assessment:   -have pelvic pain - seen 2-3 weeks ago (has resolved for one week and the reoccurred)  -5/10  -now has possible blood in stool  -drank some beet juse on friday   -no CP or SOB  -stool has turned red in color    Recommendation:   Go to ED Now  -Warm transferred to central scheduling to make an appointment at patient request  -patient informed that [facility] do not have US    CASANDRA VALVERDE, RN on 4/30/2023 at 2:24 PM       Reason for Disposition    [1] Constant abdominal pain AND [2] present > 2 hours    Additional Information    Negative: Shock suspected (e.g., cold/pale/clammy skin, too weak to stand, low BP, rapid pulse)    Negative: Passed out (i.e., lost consciousness, collapsed and was not responding)    Negative: Sounds like a life-threatening emergency to the triager    Negative: Menstrual cramps is main concern    Negative: Abdominal (stomach) pain is main concern    Negative: Vulvar (external genital area) pain or symptoms (e.g., dryness, sores, redness, blisters, bumps) is main concern    Negative: Rectal pain is main concern    Negative: Hip pain is main concern    Negative: Urination pain is main concern (pain with passing urine)    Negative: [1] Pelvic pain AND [2] pregnant < 20 weeks    Negative: [1] Pelvic pain AND [2] pregnant 20 or more weeks    Negative: Postpartum (from 0 to 6 weeks after delivery)    Negative: Pain associated with known hernia or new-onset hernia suspected (reducible bulge in groin/abdomen)    Negative: Followed an abdomen (stomach) injury    Negative: Followed a genital area injury (e.g., vagina, vulva)    Negative: [1] SEVERE pelvic pain (e.g., excruciating) AND [2] vomiting    Negative: [1] SEVERE pelvic pain AND [2] present > 1 hour    Negative: SEVERE vaginal bleeding (e.g., soaking 2 pads or tampons per hour  "and present 2 or more hours)    Negative: Patient sounds very sick or weak to the triager    Negative: Shock suspected (e.g., cold/pale/clammy skin, too weak to stand, low BP, rapid pulse)    Negative: Difficult to awaken or acting confused (e.g., disoriented, slurred speech)    Negative: Passed out (i.e., lost consciousness, collapsed and was not responding)    Negative: [1] Vomiting AND [2] contains red blood or black (\"coffee ground\") material  (Exception: few red streaks in vomit that only happened once)    Negative: Sounds like a life-threatening emergency to the triager    Negative: Diarrhea is main symptom    Negative: Stool color other than brown or tan is main concern  (no bleeding and no melena)    Negative: SEVERE dizziness (e.g., unable to stand, requires support to walk, feels like passing out now)    Negative: [1] MODERATE rectal bleeding (small blood clots, passing blood without stool, or toilet water turns red) AND [2] more than once a day    Negative: Pale skin (pallor) of new-onset or worsening    Negative: Black or tarry bowel movements (Exception: chronic-unchanged black-grey bowel movements AND is taking iron pills or Pepto-bismol)    Negative: SEVERE rectal bleeding (large blood clots; constant or on and off bleeding)    Protocols used: RECTAL BLEEDING-A-AH, PELVIC PAIN - FEMALE-A-AH      "

## 2023-05-19 ENCOUNTER — TELEPHONE (OUTPATIENT)
Dept: FAMILY MEDICINE | Facility: CLINIC | Age: 53
End: 2023-05-19
Payer: COMMERCIAL

## 2023-05-19 NOTE — TELEPHONE ENCOUNTER
Pt calling to state that they were seen in the [facility] for chest pain and was directed to have troponin labs drawn. [facility] lab location is full and pt said she will go to [facility] in [place] for these labs to be done.    Megan Workman RN

## 2023-06-01 ENCOUNTER — OFFICE VISIT (OUTPATIENT)
Dept: FAMILY MEDICINE | Facility: CLINIC | Age: 53
End: 2023-06-01
Payer: COMMERCIAL

## 2023-06-01 VITALS
TEMPERATURE: 97.8 F | OXYGEN SATURATION: 100 % | WEIGHT: 176.4 LBS | SYSTOLIC BLOOD PRESSURE: 136 MMHG | BODY MASS INDEX: 30.12 KG/M2 | DIASTOLIC BLOOD PRESSURE: 87 MMHG | HEART RATE: 83 BPM

## 2023-06-01 DIAGNOSIS — R10.84 ABDOMINAL PAIN, GENERALIZED: Primary | ICD-10-CM

## 2023-06-01 DIAGNOSIS — K59.09 CHRONIC CONSTIPATION: ICD-10-CM

## 2023-06-01 PROCEDURE — 99214 OFFICE O/P EST MOD 30 MIN: CPT | Performed by: FAMILY MEDICINE

## 2023-06-01 RX ORDER — ASPIRIN 81 MG
100 TABLET, DELAYED RELEASE (ENTERIC COATED) ORAL DAILY
Qty: 60 TABLET | Refills: 3 | Status: SHIPPED | OUTPATIENT
Start: 2023-06-01

## 2023-06-01 ASSESSMENT — ENCOUNTER SYMPTOMS: ABDOMINAL PAIN: 1

## 2023-06-01 NOTE — PROGRESS NOTES
Assessment & Plan     Abdominal pain, generalized  Possible due to constipation   Better Today     Chronic constipation  Advised   Increase Fiber in diet  - docusate sodium (COLACE) 100 MG tablet; Take 1 tablet (100 mg) by mouth daily  Anxiety  Discussed about increase in zoloft  She does not want  She will follow up PCP in 1 month-sooner if worse  Mili Ospina MD  [facility] ALEXIS Paniagua is a 53 year old, presenting for the following health issues:  Abdominal Pain        6/1/2023     8:37 AM   Additional Questions   Roomed by Bel     Abdominal Pain   This is a recurrent problem. The current episode started more than 1 week ago. The problem has been gradually improving.   History of Present Illness       Reason for visit:  Stomack pain    She eats 0-1 servings of fruits and vegetables daily.She consumes 1 sweetened beverage(s) daily.She exercises with enough effort to increase her heart rate 9 or less minutes per day.  She exercises with enough effort to increase her heart rate 3 or less days per week.   She is taking medications regularly.       Pain History:  When did you first notice your pain? 1 month   Have you seen anyone else for your pain? Yes - ER/UC  How has your pain affected your ability to work? 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 Abdominal/Flank Pain  Onset/Duration: 1 month  Description:   Character: Sharp and Dull ache  Location: right upper quadrant left upper quadrant  Radiation: Back  Intensity: mild  Progression of Symptoms:  improving  Accompanying Signs & Symptoms:  Fever/Chills: No  Gas/Bloating: YES- bloating  Nausea: No  Vomitting: No  Diarrhea: No  Constipation: YES  Dysuria or Hematuria: No  History:   Trauma: No  Previous similar pain: No  Previous tests done: none  Precipitating factors:   Does the pain change with:     Food: YES    Bowel Movement: No    Urination:  No   Other factors:  No  Therapies tried and outcome: Tylenol  Has a BM every 2 days   No GERD  Postmenopause 8 months  She also has anxiety   Her  passed away from gall Bladder Ca  Pt says abdominal pain is better Today  She has had Imaging done Before   No LMP recorded (lmp unknown). Patient is postmenopausal.  ER notes reviewed   Her chest Tightness has Resolved   says it comes on when she is anxious  None now      Review of Systems   Gastrointestinal: Positive for abdominal pain.      CONSTITUTIONAL: NEGATIVE for fever, chills, change in weight  ENT/MOUTH: NEGATIVE for ear, mouth and throat problems  RESP: NEGATIVE for significant cough or SOB  CV: NEGATIVE for chest pain, palpitations or peripheral edema  GI: as above  -none  Psych -anxiety  Rest of the ROS is Negative except see above and Problem list [stable]    Labs reviewed   ROS otherwise negative      Objective    /87   Pulse 83   Temp 97.8  F (36.6  C) (Oral)   Wt 80 kg (176 lb 6.4 oz)   LMP  (LMP Unknown)   SpO2 100%   BMI 30.12 kg/m    Body mass index is 30.12 kg/m .  Physical Exam   GENERAL: healthy, alert and no distress  NECK: no adenopathy, no asymmetry, masses, or scars and thyroid normal to palpation  RESP: lungs clear to auscultation - no rales, rhonchi or wheezes  CV: regular rate and rhythm, normal S1 S2, no S3 or S4, no murmur, click or rub, no peripheral edema and peripheral pulses strong  ABDOMEN: soft, nontender, no hepatosplenomegaly, no masses and bowel sounds normal  MS: no gross musculoskeletal defects noted, no edema  PSYCH: mentation appears normal    Office Visit on 04/18/2023   Component Date Value Ref Range Status     Trichomonas 04/18/2023 Absent  Absent Final     Yeast 04/18/2023 Absent  Absent Final     Clue Cells 04/18/2023 Absent  Absent Final     WBCs/high power field 04/18/2023 3+ (A)  None Final     Color Urine 04/18/2023 Yellow  Colorless, Straw, Light Yellow, Yellow Final     Appearance Urine  04/18/2023 Clear  Clear Final     Glucose Urine 04/18/2023 Negative  Negative mg/dL Final     Bilirubin Urine 04/18/2023 Negative  Negative Final     Ketones Urine 04/18/2023 Negative  Negative mg/dL Final     Specific Gravity Urine 04/18/2023 1.015  1.003 - 1.035 Final     Blood Urine 04/18/2023 Negative  Negative Final     pH Urine 04/18/2023 7.5 (H)  5.0 - 7.0 Final     Protein Albumin Urine 04/18/2023 Negative  Negative mg/dL Final     Urobilinogen Urine 04/18/2023 0.2  0.2, 1.0 E.U./dL Final     Nitrite Urine 04/18/2023 Negative  Negative Final     Leukocyte Esterase Urine 04/18/2023 Trace (A)  Negative Final     Bacteria Urine 04/18/2023 Few (A)  None Seen /HPF Final     RBC Urine 04/18/2023 0-2  0-2 /HPF /HPF Final     WBC Urine 04/18/2023 10-25 (A)  0-5 /HPF /HPF Final     Squamous Epithelials Urine 04/18/2023 Moderate (A)  None Seen /LPF Final     Mucus Urine 04/18/2023 Present (A)  None Seen /LPF Final     Culture 04/18/2023 No Growth   Final   Labs reviewed

## 2023-07-08 ENCOUNTER — HEALTH MAINTENANCE LETTER (OUTPATIENT)
Age: 53
End: 2023-07-08

## 2023-11-20 ENCOUNTER — ANCILLARY PROCEDURE (OUTPATIENT)
Dept: GENERAL RADIOLOGY | Facility: CLINIC | Age: 53
End: 2023-11-20
Attending: PHYSICIAN ASSISTANT
Payer: COMMERCIAL

## 2023-11-20 ENCOUNTER — OFFICE VISIT (OUTPATIENT)
Dept: FAMILY MEDICINE | Facility: CLINIC | Age: 53
End: 2023-11-20
Payer: COMMERCIAL

## 2023-11-20 VITALS
OXYGEN SATURATION: 98 % | BODY MASS INDEX: 28.42 KG/M2 | SYSTOLIC BLOOD PRESSURE: 132 MMHG | HEART RATE: 102 BPM | WEIGHT: 170.6 LBS | DIASTOLIC BLOOD PRESSURE: 82 MMHG | TEMPERATURE: 98.9 F | RESPIRATION RATE: 20 BRPM | HEIGHT: 65 IN

## 2023-11-20 DIAGNOSIS — F41.1 GENERALIZED ANXIETY DISORDER WITH PANIC ATTACKS: ICD-10-CM

## 2023-11-20 DIAGNOSIS — F41.0 GENERALIZED ANXIETY DISORDER WITH PANIC ATTACKS: ICD-10-CM

## 2023-11-20 DIAGNOSIS — R07.9 CHEST PAIN, UNSPECIFIED TYPE: Primary | ICD-10-CM

## 2023-11-20 DIAGNOSIS — F41.1 GAD (GENERALIZED ANXIETY DISORDER): ICD-10-CM

## 2023-11-20 DIAGNOSIS — R07.9 CHEST PAIN, UNSPECIFIED TYPE: ICD-10-CM

## 2023-11-20 LAB — D DIMER PPP FEU-MCNC: <0.27 UG/ML FEU (ref 0–0.5)

## 2023-11-20 PROCEDURE — 99214 OFFICE O/P EST MOD 30 MIN: CPT | Mod: 25 | Performed by: PHYSICIAN ASSISTANT

## 2023-11-20 PROCEDURE — 36415 COLL VENOUS BLD VENIPUNCTURE: CPT | Performed by: PHYSICIAN ASSISTANT

## 2023-11-20 PROCEDURE — 93000 ELECTROCARDIOGRAM COMPLETE: CPT | Performed by: PHYSICIAN ASSISTANT

## 2023-11-20 PROCEDURE — 85379 FIBRIN DEGRADATION QUANT: CPT | Performed by: PHYSICIAN ASSISTANT

## 2023-11-20 PROCEDURE — 71046 X-RAY EXAM CHEST 2 VIEWS: CPT | Mod: TC | Performed by: RADIOLOGY

## 2023-11-20 RX ORDER — CYCLOBENZAPRINE HCL 10 MG
5-10 TABLET ORAL 3 TIMES DAILY PRN
Qty: 40 TABLET | Refills: 0 | Status: SHIPPED | OUTPATIENT
Start: 2023-11-20

## 2023-11-20 RX ORDER — LORAZEPAM 1 MG/1
.5-1 TABLET ORAL 2 TIMES DAILY PRN
Qty: 25 TABLET | Refills: 0 | Status: SHIPPED | OUTPATIENT
Start: 2023-11-20

## 2023-11-20 ASSESSMENT — ENCOUNTER SYMPTOMS: NERVOUS/ANXIOUS: 1

## 2023-11-20 ASSESSMENT — ANXIETY QUESTIONNAIRES
3. WORRYING TOO MUCH ABOUT DIFFERENT THINGS: NEARLY EVERY DAY
2. NOT BEING ABLE TO STOP OR CONTROL WORRYING: NEARLY EVERY DAY
GAD7 TOTAL SCORE: 18
7. FEELING AFRAID AS IF SOMETHING AWFUL MIGHT HAPPEN: NEARLY EVERY DAY
1. FEELING NERVOUS, ANXIOUS, OR ON EDGE: NEARLY EVERY DAY
4. TROUBLE RELAXING: NEARLY EVERY DAY
5. BEING SO RESTLESS THAT IT IS HARD TO SIT STILL: NEARLY EVERY DAY
6. BECOMING EASILY ANNOYED OR IRRITABLE: NOT AT ALL
IF YOU CHECKED OFF ANY PROBLEMS ON THIS QUESTIONNAIRE, HOW DIFFICULT HAVE THESE PROBLEMS MADE IT FOR YOU TO DO YOUR WORK, TAKE CARE OF THINGS AT HOME, OR GET ALONG WITH OTHER PEOPLE: SOMEWHAT DIFFICULT
GAD7 TOTAL SCORE: 18

## 2023-11-20 ASSESSMENT — PAIN SCALES - GENERAL: PAINLEVEL: NO PAIN (0)

## 2023-11-20 NOTE — PROGRESS NOTES
Assessment & Plan   Problem List Items Addressed This Visit          Behavioral    JORDYN (generalized anxiety disorder)    Relevant Medications    LORazepam (ATIVAN) 1 MG tablet    sertraline (ZOLOFT) 50 MG tablet    Other Relevant Orders    Adult Mental Health  Referral     Other Visit Diagnoses       Chest pain, unspecified type    -  Primary    Relevant Medications    cyclobenzaprine (FLEXERIL) 10 MG tablet    Other Relevant Orders    Echocardiogram Exercise Stress    EKG 12-lead complete w/read - Clinics (Completed)    XR Chest 2 Views (Completed)    D dimer, quantitative (Completed)    Generalized anxiety disorder with panic attacks        Relevant Medications    LORazepam (ATIVAN) 1 MG tablet    sertraline (ZOLOFT) 50 MG tablet           Chest pain from unknown etiology, but anxiety could be contributing. She tells me her symptoms are identical to previous that have had negative cardiac workups in the ER. EKG nonischemic and without worrisome dysrhythmia. CXR shows no pneumonia, pneumothorax, pleural effusion, edema, or other worrisome process. Low risk for PE and has a negative d-dimer. Unlikely PE. Given intermittent, this is unlikely to be aortic pathology. Will obtain cardiac stress test as she has not had one before. Encouraged her to continue sertraline regularly and will trial lorazepam for breakthrough anxiety. Follow up with us in next 1-2 weeks if not improving.    Complete history and physical exam as below. Afebrile with normal vital signs  aside from tachycardia which resolved by the time I examined him.    DDx and Dx discussed with and explained to the pt to their satisfaction.  All questions were answered at this time. Pt expressed understanding of and agreement with this dx, tx, and plan. No further workup warranted and standard medication warnings given. I have given the patient a list of pertinent indications for re-evaluation. Will go to the Emergency Department if symptoms worsen  "or new concerning symptoms arise. Patient left in no apparent distress.         Ordering of each unique test  Prescription drug management  32 minutes spent by me on the date of the encounter doing chart review, history and exam, documentation and further activities per the note     BMI:   Estimated body mass index is 28.83 kg/m  as calculated from the following:    Height as of this encounter: 1.638 m (5' 4.5\").    Weight as of this encounter: 77.4 kg (170 lb 9.6 oz).     See Patient Instructions    ENRIQUETA Ceja  [facility] YANIRA Paniagua is a 53 year old, presenting for the following health issues:  Anxiety (recheck), Chest Pain (1 wk/Off an on /Mostly on left side), and Health Maintenance (Patient declines influenza and covid vaccines)        11/20/2023     9:26 AM   Additional Questions   Roomed by Kaleigh Rojas CMA   Accompanied by daughterYusuf         11/20/2023     9:26 AM   Patient Reported Additional Medications   Patient reports taking the following new medications none       Anxiety    History of Present Illness       Back Pain:  She presents for follow up of back pain. Patient's back pain is a chronic problem.  Location of back pain:  Right upper back, left upper back and left shoulder  Description of back pain: dull ache  Back pain spreads: nowhere    Since patient first noticed back pain, pain is: unchanged  Does back pain interfere with her job:  No       Mental Health Follow-up:  Patient presents to follow-up on Depression & Anxiety.Patient's depression since last visit has been:  No change  The patient is having other symptoms associated with depression.  Patient's anxiety since last visit has been:  No change  The patient is not having other symptoms associated with anxiety.  Any significant life events: grief or loss  Patient is feeling anxious or having panic attacks.  Patient has no concerns about alcohol or drug use.    She eats 0-1 servings of fruits " "and vegetables daily.She consumes 1 sweetened beverage(s) daily.She exercises with enough effort to increase her heart rate 9 or less minutes per day.  She exercises with enough effort to increase her heart rate 3 or less days per week.   She is not taking prescribed medications regularly due to side effects.   Chest pressure for 2 weeks intermittently. Worse with watching the news, particularly stressful stories about the conflict in [place]. Present currently and 5/10. No sob, dizziness, fevers, or other symptoms. Intermittent cold sensation in the right arm. Scapular pain. Feels that anxiety is causing worse pain.     Review of Systems   Psychiatric/Behavioral:  The patient is nervous/anxious.       Constitutional, HEENT, cardiovascular, pulmonary, gi and gu systems are negative, except as otherwise noted.      Objective    /82   Pulse 102   Temp 98.9  F (37.2  C) (Temporal)   Resp 20   Ht 1.638 m (5' 4.5\")   Wt 77.4 kg (170 lb 9.6 oz)   LMP  (LMP Unknown)   SpO2 98%   BMI 28.83 kg/m    Body mass index is 28.83 kg/m .  Physical Exam  Vitals and nursing note reviewed.   Constitutional:       General: She is not in acute distress.     Appearance: She is not ill-appearing or diaphoretic.   HENT:      Head: Normocephalic and atraumatic.      Mouth/Throat:      Mouth: Mucous membranes are moist.   Eyes:      Conjunctiva/sclera: Conjunctivae normal.   Cardiovascular:      Rate and Rhythm: Normal rate and regular rhythm.      Heart sounds: Normal heart sounds. No murmur heard.     No friction rub. No gallop.      Comments: 2+ symmetric radial/PT pulses. No LE edema or tenderness.  Pulmonary:      Effort: Pulmonary effort is normal. No respiratory distress.      Breath sounds: Normal breath sounds. No stridor. No wheezing, rhonchi or rales.   Abdominal:      General: Bowel sounds are normal. There is no distension.      Palpations: Abdomen is soft. There is no mass.      Tenderness: There is no " abdominal tenderness. There is no guarding or rebound.      Hernia: No hernia is present.   Skin:     General: Skin is warm and dry.   Neurological:      General: No focal deficit present.      Mental Status: She is alert. Mental status is at baseline.   Psychiatric:         Mood and Affect: Mood normal.         Behavior: Behavior normal.          EKG - Reviewed and interpreted by me appears normal, NSR, normal axis, normal intervals, no acute ST/T changes c/w ischemia, no LVH by voltage criteria, unchanged from previous tracings  Results for orders placed or performed in visit on 11/20/23   XR Chest 2 Views     Status: None    Narrative    CHEST TWO VIEWS  11/20/2023 10:34 AM     HISTORY:  Chest pain, unspecified type.    COMPARISON: 1/3/2023.      Impression    IMPRESSION: Chest is negative and unchanged. Lungs clear.    ARIADNA VELAZQUEZ MD         SYSTEM ID:  [id number]   Results for orders placed or performed in visit on 11/20/23   D dimer, quantitative     Status: Normal   Result Value Ref Range    D-Dimer Quantitative <0.27 0.00 - 0.50 ug/mL FEU    Narrative    This D-dimer assay is intended for use in conjunction with a clinical pretest probability assessment model to exclude pulmonary embolism (PE) and deep venous thrombosis (DVT) in outpatients suspected of PE or DVT. The cut-off value is 0.50 ug/mL FEU.    For patients 50 years of age or older, the application of age-adjusted cut-off values for D-Dimer may increase the specificity without significant effect on sensitivity. The literature suggested calculation age adjusted cut-off in ug/L = age in years x 10 ug/L. The results in this laboratory are reported as ug/mL rather than ug/L. The calculation for age adjusted cut off in ug/mL= age in years x 0.01 ug/mL. For example, the cut off for a 76 year old male is 76 x 0.01 ug/mL = 0.76 ug/mL (760 ug/L).    YAO Guerra et al. Age adjusted D-dimer cut-off levels to rule out pulmonary embolism: The ADJUST-PE Study.  GLORIA 2014;311:6341-8073.; HJ Felisha et al. Diagnostic accuracy of conventional or age adjusted D-dimer cutoff values in older patients with suspected venous thromboembolism. Systemic review and meta-analysis. BMJ 2013:346:f2492.

## 2023-11-20 NOTE — PATIENT INSTRUCTIONS
Rachel Paniagua,    Thank you for allowing [facility] to manage your care.    I am unsure of the cause of your symptoms, but your exam is reassuring. We will see what our workup shows.     If your d dimer test returns abnormal/elevated, you develop worsening/changing symptoms at any time or you change your mind about my recommendation for an ER visit, please be seen in clinic/urgent care or call 911/go to the emergency department for evaluation as we discussed.    I ordered some lab work. Please go to the laboratory to get your studies.    I ordered some xrays. Please go to our radiology department to get your xrays.    I sent your prescriptions to your pharmacy.    For anxiety and back pain not controlled by over the counter medications, please use lorazepam and cyclobenzaprine as prescribed, respectively. Do not use this medication while driving, operating machinery, with other sedating medications, or while drinking alcohol as it will make you drowsy.    I ordered a echocardiogram stress test of your heart. Please call diagnostic imaging [phone] to schedule your test.    I made a referral to mental health therapy. They will be calling in approximately 1 week to set up your appointment.  If you do not hear from them, please call the specialty number on your after visit summary.     Please allow 1-2 business days for our office to contact you in regards to your laboratory/radiological studies.  If not done so, I encourage you to login into [employer] ([email or web address] to review your results as well.     If you have any questions or concerns, please feel free to call us at [phone]    Sincerely,    Jimmie Tang PA-C    Did you know?      You can schedule a video visit for follow-up appointments as well as future appointments for certain conditions.  Please see the below link.     [email or web address]    If you have not already done so,  I  encourage you to sign up for [employer] ([email or web address]).  This will allow you to review your results, securely communicate with a provider, and schedule virtual visits as well.

## 2023-11-25 ENCOUNTER — HEALTH MAINTENANCE LETTER (OUTPATIENT)
Age: 53
End: 2023-11-25

## 2024-02-08 ENCOUNTER — HOSPITAL ENCOUNTER (OUTPATIENT)
Dept: CARDIOLOGY | Facility: CLINIC | Age: 54
Discharge: HOME | End: 2024-02-08
Attending: PHYSICIAN ASSISTANT | Admitting: PHYSICIAN ASSISTANT
Payer: COMMERCIAL

## 2024-02-08 DIAGNOSIS — R07.9 CHEST PAIN, UNSPECIFIED TYPE: ICD-10-CM

## 2024-02-08 PROCEDURE — 93016 CV STRESS TEST SUPVJ ONLY: CPT | Performed by: INTERNAL MEDICINE

## 2024-02-08 PROCEDURE — 255N000002 HC RX 255 OP 636: Performed by: INTERNAL MEDICINE

## 2024-02-08 PROCEDURE — 93321 DOPPLER ECHO F-UP/LMTD STD: CPT | Mod: 26 | Performed by: INTERNAL MEDICINE

## 2024-02-08 PROCEDURE — 93018 CV STRESS TEST I&R ONLY: CPT | Performed by: INTERNAL MEDICINE

## 2024-02-08 PROCEDURE — 93350 STRESS TTE ONLY: CPT | Mod: 26 | Performed by: INTERNAL MEDICINE

## 2024-02-08 PROCEDURE — 93325 DOPPLER ECHO COLOR FLOW MAPG: CPT | Mod: 26 | Performed by: INTERNAL MEDICINE

## 2024-02-08 RX ADMIN — PERFLUTREN 5 ML: 6.52 INJECTION, SUSPENSION INTRAVENOUS at 10:22

## 2024-02-21 ENCOUNTER — OFFICE VISIT (OUTPATIENT)
Dept: FAMILY MEDICINE | Facility: CLINIC | Age: 54
End: 2024-02-21
Payer: COMMERCIAL

## 2024-02-21 VITALS
WEIGHT: 171.2 LBS | SYSTOLIC BLOOD PRESSURE: 126 MMHG | HEIGHT: 65 IN | DIASTOLIC BLOOD PRESSURE: 80 MMHG | OXYGEN SATURATION: 100 % | HEART RATE: 102 BPM | TEMPERATURE: 97.9 F | RESPIRATION RATE: 18 BRPM | BODY MASS INDEX: 28.52 KG/M2

## 2024-02-21 DIAGNOSIS — F41.0 GENERALIZED ANXIETY DISORDER WITH PANIC ATTACKS: ICD-10-CM

## 2024-02-21 DIAGNOSIS — S61.224A LACERATION OF RIGHT RING FINGER WITH FOREIGN BODY WITHOUT DAMAGE TO NAIL, INITIAL ENCOUNTER: ICD-10-CM

## 2024-02-21 DIAGNOSIS — F41.1 GENERALIZED ANXIETY DISORDER WITH PANIC ATTACKS: ICD-10-CM

## 2024-02-21 DIAGNOSIS — R10.12 LUQ ABDOMINAL PAIN: Primary | ICD-10-CM

## 2024-02-21 PROCEDURE — 99214 OFFICE O/P EST MOD 30 MIN: CPT | Mod: 25 | Performed by: PHYSICIAN ASSISTANT

## 2024-02-21 PROCEDURE — 93000 ELECTROCARDIOGRAM COMPLETE: CPT | Performed by: PHYSICIAN ASSISTANT

## 2024-02-21 RX ORDER — FAMOTIDINE 20 MG/1
20 TABLET, FILM COATED ORAL 2 TIMES DAILY PRN
Qty: 60 TABLET | Refills: 0 | Status: SHIPPED | OUTPATIENT
Start: 2024-02-21

## 2024-02-21 ASSESSMENT — ANXIETY QUESTIONNAIRES
6. BECOMING EASILY ANNOYED OR IRRITABLE: NOT AT ALL
IF YOU CHECKED OFF ANY PROBLEMS ON THIS QUESTIONNAIRE, HOW DIFFICULT HAVE THESE PROBLEMS MADE IT FOR YOU TO DO YOUR WORK, TAKE CARE OF THINGS AT HOME, OR GET ALONG WITH OTHER PEOPLE: VERY DIFFICULT
7. FEELING AFRAID AS IF SOMETHING AWFUL MIGHT HAPPEN: NEARLY EVERY DAY
2. NOT BEING ABLE TO STOP OR CONTROL WORRYING: MORE THAN HALF THE DAYS
4. TROUBLE RELAXING: MORE THAN HALF THE DAYS
8. IF YOU CHECKED OFF ANY PROBLEMS, HOW DIFFICULT HAVE THESE MADE IT FOR YOU TO DO YOUR WORK, TAKE CARE OF THINGS AT HOME, OR GET ALONG WITH OTHER PEOPLE?: VERY DIFFICULT
7. FEELING AFRAID AS IF SOMETHING AWFUL MIGHT HAPPEN: NEARLY EVERY DAY
GAD7 TOTAL SCORE: 11
3. WORRYING TOO MUCH ABOUT DIFFERENT THINGS: MORE THAN HALF THE DAYS
GAD7 TOTAL SCORE: 11
1. FEELING NERVOUS, ANXIOUS, OR ON EDGE: MORE THAN HALF THE DAYS
GAD7 TOTAL SCORE: 11
5. BEING SO RESTLESS THAT IT IS HARD TO SIT STILL: NOT AT ALL

## 2024-02-21 ASSESSMENT — PAIN SCALES - GENERAL: PAINLEVEL: MILD PAIN (3)

## 2024-02-21 NOTE — PATIENT INSTRUCTIONS
Rachel Paniagua,    Thank you for allowing [facility] to manage your care.    I am unsure of the cause of your symptoms, but your exam is reassuring. We will see what our workup shows.     If you develop worsening/changing symptoms at any time, please be seen in clinic/urgent care or call 911/go to the emergency department for evaluation as we discussed.    I ordered some lab work. Please call [address] [place] [address] or your local [facility] to schedule a lab only visit for swabs.    I sent your prescriptions to your pharmacy.    I made a referral to general surgery to have your finger examined. They will be calling in approximately 1 week to set up your appointment.  If you do not hear from them, please call the specialty number on your after visit summary.     Please allow 1-2 business days for our office to contact you in regards to your laboratory/radiological studies.  If not done so, I encourage you to login into [employer] ([email or web address] to review your results as well.     If you have any questions or concerns, please feel free to call us at [phone]    Sincerely,    Jimmie Tang PA-C    Did you know?      You can schedule a video visit for follow-up appointments as well as future appointments for certain conditions.  Please see the below link.     [email or web address]    If you have not already done so,  I encourage you to sign up for [employer] ([email or web address]).  This will allow you to review your results, securely communicate with a provider, and schedule virtual visits as well.

## 2024-02-21 NOTE — PROGRESS NOTES
Assessment & Plan   Problem List Items Addressed This Visit    None  Visit Diagnoses       LUQ abdominal pain    -  Primary    Relevant Medications    famotidine (PEPCID) 20 MG tablet    Other Relevant Orders    Helicobacter pylori Antigen Stool    EKG 12-lead complete w/read - Clinics (Completed)    CBC with platelets    Comprehensive metabolic panel (BMP + Alb, Alk Phos, ALT, AST, Total. Bili, TP)    Lipase    UA Macroscopic with reflex to Microscopic and Culture - Lab Collect    Generalized anxiety disorder with panic attacks        Relevant Medications    sertraline (ZOLOFT) 50 MG tablet    Laceration of right ring finger with foreign body without damage to nail, initial encounter        Relevant Orders    Adult General Surg Referral           Patient presents with 3-4 weeks of intermittent dull aching of her left upper abdomen. She reports that this pain feels identical to the pain she has experienced and been evaluated for several times over the past few years that have had negative cardiac workups. Stress echo completed on 2/8 was normal without evidence of ischemia. Unsure of what may be causing patient's symptoms, but differentials include H. Pylori, constipation, anxiety, pancreatitis, costochondritis, PNA, ACS, UTI. Patient appears well and non-toxic and exam is reassuring. Low suspicion for PNA as patient is not experiencing any shortness of breath, difficulty breathing, cough, fevers, or chills.  EKG completed in office today is nonischemic and without worrisome dysrhythmia. Will order stool sample to evaluate for H. Pylori infection, UA to evaluate for infection, and blood work to evaluate for signs of pancreatitis or other infection -- awaiting results. Low suspicion for GI bleed with PUD as she denies melena and blood in stool. She declines imaging today and would like to see what the above workup shows. Given that patient reports unmanaged anxiety and that this could be contributing to her  "symptoms, recommend patient resume sertraline for anxiety. Had tolerated this well previously, but would discontinue after worry/mood improved. Follow up in one week if symptoms are not improved or call 911/go to the emergency department with any changing or worsening symptoms.     For foreign body on right hand ring finger, recommend patient follow up with general surgery for removal if pain persists. No evidence of cellulitis or tendon injury on exam. Follow up in urgent care or emergency department with any changing or worsening symptoms.     Complete history and physical exam as below. Afebrile with normal vital signs.    DDx and Dx discussed with and explained to the pt to their satisfaction.  All questions were answered at this time. Pt expressed understanding of and agreement with this dx, tx, and plan. No further workup warranted and standard medication warnings given. I have given the patient a list of pertinent indications for re-evaluation. Will go to the Emergency Department if symptoms worsen or new concerning symptoms arise. Patient left in no apparent distress.     Ordering of each unique test  Prescription drug management  30 minutes spent by me on the date of the encounter doing chart review, history and exam, documentation and further activities per the note     BMI  Estimated body mass index is 28.91 kg/m  as calculated from the following:    Height as of this encounter: 1.639 m (5' 4.53\").    Weight as of this encounter: 77.7 kg (171 lb 3.2 oz).     See Patient Instructions      Scott Paniagua is a 53 year old, presenting for the following health issues:  Rib Pain        2/21/2024     7:01 AM   Additional Questions   Roomed by Derek Hidalgo CMA   Accompanied by N/A         2/21/2024     7:01 AM   Patient Reported Additional Medications   Patient reports taking the following new medications No new medications     History of Present Illness       Mental Health Follow-up:  Patient presents to " follow-up on Depression & Anxiety.Patient's depression since last visit has been:  No change  The patient is not having other symptoms associated with depression.  Patient's anxiety since last visit has been:  No change  The patient is not having other symptoms associated with anxiety.  Any significant life events: grief or loss  Patient is feeling anxious or having panic attacks.  Patient has no concerns about alcohol or drug use.    Reason for visit:  Follow up    She eats 0-1 servings of fruits and vegetables daily.She consumes 0 sweetened beverage(s) daily.She exercises with enough effort to increase her heart rate 9 or less minutes per day.  She exercises with enough effort to increase her heart rate 3 or less days per week.   She is taking medications regularly.     Patient presents today for evaluation of abdominal pain below her left ribcage. She states that this pain is the same as what she has been evaluated for several times over the past few years. The current flare of pain has been coming and going for about 3-4 weeks but has slightly improved over the past few days. She states that the pain is dull and uncomfortable, and when she experiences tinges of pain, she almost always passes gas just a few seconds later. The pain also seems to be worse after eating. Patient has also noticed that her stool has had a stronger odor over the past few weeks. She is also experiencing dizziness that makes her feel unsteady rather than like the room is spinning, which she has experienced with previous flares of her abdominal pain. She states that because this pain feels the same as what she has experienced/been evaluated for numerous times over the past several years, she would like to avoid imaging, if possible. She states that she feels her symptoms are related to anxiety, constipation, and possible h. Pylori. Of note, her niece passed away from a brain tumor just a few weeks ago. She has prescriptions of sertraline  "and ativan for anxiety but has not been taking these medications because she did not feel like it, though she denies experiencing any side effects to the medications. She states that she has also been provided a referral to therapy to help with her anxiety but has not followed up with this. This pain is the same kind of pain she has previously been evaluated for.      She would also like her right hand evaluated because she got a piece of glass in her ring finger a couple of weeks ago. She has not tried removing the piece of glass. She denies experiencing any swelling, redness, or drainage at the site and has no limited movement of her finger.    Review of Systems  Constitutional, HEENT, cardiovascular, pulmonary, gi and gu systems are negative, except as otherwise noted.      Objective    /80   Pulse 102   Temp 97.9  F (36.6  C) (Temporal)   Resp 18   Ht 1.639 m (5' 4.53\")   Wt 77.7 kg (171 lb 3.2 oz)   LMP  (LMP Unknown)   SpO2 100%   BMI 28.91 kg/m    Body mass index is 28.91 kg/m .  Physical Exam   GENERAL: alert and no distress  EYES: Eyes grossly normal to inspection, PERRL and conjunctivae and sclerae normal  HENT: ear canals and TM's normal, nose and mouth without ulcers or lesions  NECK: no adenopathy, no asymmetry, masses, or scars  RESP: lungs clear to auscultation - no rales, rhonchi or wheezes  CV: regular rate and rhythm, normal S1 S2, no S3 or S4, no murmur, click or rub, no peripheral edema  ABDOMEN: soft, nontender, no hepatosplenomegaly, no masses and bowel sounds normal  Skin: Tender and firm 5mm nodule to the radial aspect of the right ring finger. No erythema, warmth, discharge.  MS: no gross musculoskeletal defects noted, no edema  NEURO: Normal strength and tone, mentation intact and speech normal  PSYCH: mentation appears normal, affect normal/bright    EKG - Reviewed and interpreted by me appears normal, NSR, normal axis, normal intervals, no acute ST/T changes c/w ischemia, no " LVH by voltage criteria, smaller QRS complex in v2 otherwise unchanged from previous tracings        Signed Electronically by: Lucien Tnag PA

## 2024-02-26 ENCOUNTER — LAB (OUTPATIENT)
Dept: LAB | Facility: CLINIC | Age: 54
End: 2024-02-26
Payer: COMMERCIAL

## 2024-02-26 DIAGNOSIS — R10.12 LUQ ABDOMINAL PAIN: ICD-10-CM

## 2024-02-26 LAB
ALBUMIN SERPL BCG-MCNC: 4.4 G/DL (ref 3.5–5.2)
ALBUMIN UR-MCNC: NEGATIVE MG/DL
ALP SERPL-CCNC: 108 U/L (ref 40–150)
ALT SERPL W P-5'-P-CCNC: 17 U/L (ref 0–50)
ANION GAP SERPL CALCULATED.3IONS-SCNC: 10 MMOL/L (ref 7–15)
APPEARANCE UR: CLEAR
AST SERPL W P-5'-P-CCNC: 22 U/L (ref 0–45)
BILIRUB SERPL-MCNC: 0.3 MG/DL
BILIRUB UR QL STRIP: NEGATIVE
BUN SERPL-MCNC: 14.6 MG/DL (ref 6–20)
CALCIUM SERPL-MCNC: 9.8 MG/DL (ref 8.6–10)
CHLORIDE SERPL-SCNC: 106 MMOL/L (ref 98–107)
COLOR UR AUTO: YELLOW
CREAT SERPL-MCNC: 0.7 MG/DL (ref 0.51–0.95)
DEPRECATED HCO3 PLAS-SCNC: 26 MMOL/L (ref 22–29)
EGFRCR SERPLBLD CKD-EPI 2021: >90 ML/MIN/1.73M2
ERYTHROCYTE [DISTWIDTH] IN BLOOD BY AUTOMATED COUNT: 12.9 % (ref 10–15)
GLUCOSE SERPL-MCNC: 108 MG/DL (ref 70–99)
GLUCOSE UR STRIP-MCNC: NEGATIVE MG/DL
HCT VFR BLD AUTO: 41 % (ref 35–47)
HGB BLD-MCNC: 13.6 G/DL (ref 11.7–15.7)
HGB UR QL STRIP: NEGATIVE
KETONES UR STRIP-MCNC: NEGATIVE MG/DL
LEUKOCYTE ESTERASE UR QL STRIP: NEGATIVE
LIPASE SERPL-CCNC: 22 U/L (ref 13–60)
MCH RBC QN AUTO: 29.2 PG (ref 26.5–33)
MCHC RBC AUTO-ENTMCNC: 33.2 G/DL (ref 31.5–36.5)
MCV RBC AUTO: 88 FL (ref 78–100)
NITRATE UR QL: NEGATIVE
PH UR STRIP: 5.5 [PH] (ref 5–7)
PLATELET # BLD AUTO: 222 10E3/UL (ref 150–450)
POTASSIUM SERPL-SCNC: 4.7 MMOL/L (ref 3.4–5.3)
PROT SERPL-MCNC: 7 G/DL (ref 6.4–8.3)
RBC # BLD AUTO: 4.65 10E6/UL (ref 3.8–5.2)
SODIUM SERPL-SCNC: 142 MMOL/L (ref 135–145)
SP GR UR STRIP: 1.02 (ref 1–1.03)
UROBILINOGEN UR STRIP-ACNC: 0.2 E.U./DL
WBC # BLD AUTO: 3.7 10E3/UL (ref 4–11)

## 2024-02-26 PROCEDURE — 80053 COMPREHEN METABOLIC PANEL: CPT

## 2024-02-26 PROCEDURE — 36415 COLL VENOUS BLD VENIPUNCTURE: CPT

## 2024-02-26 PROCEDURE — 83690 ASSAY OF LIPASE: CPT

## 2024-02-26 PROCEDURE — 81003 URINALYSIS AUTO W/O SCOPE: CPT

## 2024-02-26 PROCEDURE — 85027 COMPLETE CBC AUTOMATED: CPT

## 2024-02-26 PROCEDURE — 87338 HPYLORI STOOL AG IA: CPT

## 2024-02-28 LAB — H PYLORI AG STL QL IA: NEGATIVE

## 2024-03-21 ENCOUNTER — PATIENT OUTREACH (OUTPATIENT)
Dept: CARE COORDINATION | Facility: CLINIC | Age: 54
End: 2024-03-21
Payer: COMMERCIAL

## 2024-04-18 ENCOUNTER — PATIENT OUTREACH (OUTPATIENT)
Dept: CARE COORDINATION | Facility: CLINIC | Age: 54
End: 2024-04-18
Payer: COMMERCIAL

## 2024-06-22 ENCOUNTER — HEALTH MAINTENANCE LETTER (OUTPATIENT)
Age: 54
End: 2024-06-22

## 2024-07-19 ENCOUNTER — OFFICE VISIT (OUTPATIENT)
Dept: FAMILY MEDICINE | Facility: CLINIC | Age: 54
End: 2024-07-19
Payer: COMMERCIAL

## 2024-07-19 VITALS
WEIGHT: 170.6 LBS | TEMPERATURE: 97.3 F | HEART RATE: 109 BPM | HEIGHT: 65 IN | OXYGEN SATURATION: 100 % | SYSTOLIC BLOOD PRESSURE: 124 MMHG | RESPIRATION RATE: 18 BRPM | DIASTOLIC BLOOD PRESSURE: 82 MMHG | BODY MASS INDEX: 28.42 KG/M2

## 2024-07-19 DIAGNOSIS — F41.1 GENERALIZED ANXIETY DISORDER WITH PANIC ATTACKS: ICD-10-CM

## 2024-07-19 DIAGNOSIS — R07.9 CHEST PAIN, UNSPECIFIED TYPE: Primary | ICD-10-CM

## 2024-07-19 DIAGNOSIS — F41.0 GENERALIZED ANXIETY DISORDER WITH PANIC ATTACKS: ICD-10-CM

## 2024-07-19 DIAGNOSIS — Z11.1 SCREENING EXAMINATION FOR PULMONARY TUBERCULOSIS: ICD-10-CM

## 2024-07-19 PROCEDURE — 99214 OFFICE O/P EST MOD 30 MIN: CPT | Performed by: PHYSICIAN ASSISTANT

## 2024-07-19 PROCEDURE — 86481 TB AG RESPONSE T-CELL SUSP: CPT | Performed by: PHYSICIAN ASSISTANT

## 2024-07-19 PROCEDURE — 36415 COLL VENOUS BLD VENIPUNCTURE: CPT | Performed by: PHYSICIAN ASSISTANT

## 2024-07-19 RX ORDER — SUCRALFATE 1 G/1
1 TABLET ORAL 4 TIMES DAILY PRN
Qty: 240 TABLET | Refills: 0 | Status: SHIPPED | OUTPATIENT
Start: 2024-07-19 | End: 2024-07-26

## 2024-07-19 RX ORDER — SERTRALINE HYDROCHLORIDE 100 MG/1
100 TABLET, FILM COATED ORAL DAILY
Qty: 90 TABLET | Refills: 1 | Status: SHIPPED | OUTPATIENT
Start: 2024-07-19

## 2024-07-19 RX ORDER — SUCRALFATE ORAL 1 G/10ML
1 SUSPENSION ORAL 4 TIMES DAILY PRN
COMMUNITY
Start: 2024-07-02 | End: 2024-07-19

## 2024-07-19 ASSESSMENT — PAIN SCALES - GENERAL: PAINLEVEL: NO PAIN (0)

## 2024-07-19 NOTE — PROGRESS NOTES
"  Assessment & Plan   Problem List Items Addressed This Visit    None  Visit Diagnoses       Chest pain, unspecified type    -  Primary    Relevant Medications    sucralfate (CARAFATE) 1 GM tablet    Other Relevant Orders    Adult GI  Referral - Procedure Only    Adult GI  Referral - Procedure Only    Screening examination for pulmonary tuberculosis        Relevant Orders    Quantiferon TB Gold Plus    Generalized anxiety disorder with panic attacks        Relevant Medications    sertraline (ZOLOFT) 100 MG tablet           Likely gastritis vs PUD vs GERD. Recommended she take omeprazole and sucralfate regularly. EGD referral placed. Reassuring exam today. Appears well and non-toxic and I have low suspicion for systemic illness or worrisome acute abdominopelvic emergency. Will also increase sertraline to  100mg as she has not had improvement with 50mg. Tolerating well. Follow up with primary or myself prn in the meantime.     Complete history and physical exam as below. Afebrile with normal vital signs  aside from tachycardia which resolved by the time I examined her.    DDx and Dx discussed with and explained to the pt to their satisfaction.  All questions were answered at this time. Pt expressed understanding of and agreement with this dx, tx, and plan. No further workup warranted and standard medication warnings given. I have given the patient a list of pertinent indications for re-evaluation. Will go to the Emergency Department if symptoms worsen or new concerning symptoms arise. Patient left in no apparent distress.     Ordering of each unique test  Prescription drug management  31 minutes spent by me on the date of the encounter doing chart review, history and exam, documentation and further activities per the note     BMI  Estimated body mass index is 28.8 kg/m  as calculated from the following:    Height as of this encounter: 1.639 m (5' 4.53\").    Weight as of this encounter: 77.4 kg (170 " "lb 9.6 oz).     See Patient Instructions      Subjective   Arcelia is a 54 year old, presenting for the following health issues:  Pain        7/19/2024     7:01 AM   Additional Questions   Roomed by Derek Hidalgo CMA   Accompanied by N/A         7/19/2024   Forms   Any forms needing to be completed Yes          7/19/2024     7:01 AM   Patient Reported Additional Medications   Patient reports taking the following new medications Sucralfate 1gm/10 ml susp      History of Present Illness       Headaches:   Since the patient's last clinic visit, headaches are: worsened  The patient is getting headaches:  2 to 3 times a week  She is able to do normal daily activities when she has a migraine.  The patient is taking the following rescue/relief medications:  Tylenol   Patient states \"I get some relief\" from the rescue/relief medications.   The patient is taking the following medications to prevent migraines:  No medications to prevent migraines  In the past 4 weeks, the patient has gone to an Urgent Care or Emergency Room 1 time times due to headaches.    Reason for visit:  Chest pain GI and headache    She eats 0-1 servings of fruits and vegetables daily.She consumes 2 sweetened beverage(s) daily.She exercises with enough effort to increase her heart rate 9 or less minutes per day.  She exercises with enough effort to increase her heart rate 3 or less days per week.   She is taking medications regularly.     Patient is coming in today regarding chest, stomach discomfort that has been ongoing for one month.  Patient states pain feels like a burning and is constant.  There is no shortness of breath noted. Epigastric and non-radiating. Seen in ER 18 days ago and had a negative workup. Hoping for EGD for further eval. Previous H pylori was negative. Sucralfate helps symptoms.    Review of Systems  Constitutional, HEENT, cardiovascular, pulmonary, gi and gu systems are negative, except as otherwise noted.      Objective    BP " "124/82   Pulse 109   Temp 97.3  F (36.3  C) (Temporal)   Resp 18   Ht 1.639 m (5' 4.53\")   Wt 77.4 kg (170 lb 9.6 oz)   LMP  (LMP Unknown)   SpO2 100%   BMI 28.80 kg/m    Body mass index is 28.8 kg/m .  Physical Exam  Vitals and nursing note reviewed.   Constitutional:       General: She is not in acute distress.     Appearance: She is not ill-appearing or diaphoretic.   HENT:      Head: Normocephalic and atraumatic.      Mouth/Throat:      Mouth: Mucous membranes are moist.   Eyes:      Conjunctiva/sclera: Conjunctivae normal.   Cardiovascular:      Rate and Rhythm: Normal rate and regular rhythm.      Heart sounds: Normal heart sounds. No murmur heard.     No friction rub. No gallop.      Comments: 2+ symmetric radial/PT pulses. No LE edema or tenderness.  Pulmonary:      Effort: Pulmonary effort is normal. No respiratory distress.      Breath sounds: Normal breath sounds. No stridor. No wheezing, rhonchi or rales.   Abdominal:      General: Bowel sounds are normal. There is no distension.      Palpations: Abdomen is soft. There is no mass.      Tenderness: There is no abdominal tenderness. There is no guarding or rebound.      Hernia: No hernia is present.   Skin:     General: Skin is warm and dry.   Neurological:      General: No focal deficit present.      Mental Status: She is alert. Mental status is at baseline.   Psychiatric:         Mood and Affect: Mood normal.         Behavior: Behavior normal.                Signed Electronically by: ENRIQUETA Ceja    "

## 2024-07-19 NOTE — PATIENT INSTRUCTIONS
Rachel Paniagua,    Thank you for allowing [facility] to manage your care.    I am unsure of the cause of your symptoms, but your exam is reassuring. This could be GERD/acid reflux. We will see what our workup shows.     If you develop worsening/changing symptoms at any time, please be seen in clinic/urgent care or call 911/go to the emergency department for evaluation.    I ordered some lab work. Please go to the laboratory to get your studies.    I sent your prescriptions to your pharmacy.    I made a referral to get an EGD test. They will be calling in approximately 1 week to set up your appointment.  If you do not hear from them, please call the specialty number on your after visit summary.     Please allow 1-2 business days for our office to contact you in regards to your laboratory/radiological studies.  If not done so, I encourage you to login into [employer] ([email or web address] to review your results as well.     If you have any questions or concerns, please feel free to call us at [phone]    Sincerely,    Jimmie Tang PA-C    Did you know?      You can schedule a video visit for follow-up appointments as well as future appointments for certain conditions.  Please see the below link.     [email or web address]    If you have not already done so,  I encourage you to sign up for [employer] ([email or web address]).  This will allow you to review your results, securely communicate with a provider, and schedule virtual visits as well.

## 2024-07-21 LAB
QUANTIFERON MITOGEN: 10 IU/ML
QUANTIFERON NIL TUBE: 0.02 IU/ML

## 2024-07-22 LAB
GAMMA INTERFERON BACKGROUND BLD IA-ACNC: 0.02 IU/ML
M TB IFN-G BLD-IMP: POSITIVE
M TB IFN-G CD4+ BCKGRND COR BLD-ACNC: 9.98 IU/ML
MITOGEN IGNF BCKGRD COR BLD-ACNC: 1.14 IU/ML
MITOGEN IGNF BCKGRD COR BLD-ACNC: 1.2 IU/ML
QUANTIFERON TB1 TUBE: 1.16 IU/ML
QUANTIFERON TB2 TUBE: 1.22

## 2024-07-23 DIAGNOSIS — R76.12 POSITIVE QUANTIFERON-TB GOLD TEST: Primary | ICD-10-CM

## 2024-07-23 NOTE — PROGRESS NOTES
Repeat quant gold and CXR ordered to confirm latent TB and rule out active, respectively. ID referral placed and message sent to patient.

## 2024-07-23 NOTE — PROGRESS NOTES
New Referral Triage: LTBI    Is the patient a pre-transplant Eval: No    Is the patient Symptomatic (Lungs, GI, Lymph nodes, eyes): No    If yes to lungs:   - Do they currently have a cough:   - Phlegm/ sputum production:   - Fever/ Chills/ Night sweats:   -Difficulty in breathing:   -Unintentional weight loss in last 3 months:   -Duration of symptoms:    Chest imaging available within last month:No, but is ordered. Will ensure it is completed prior to scheduling   If yes- Normal or abnormal:    If patient is experiencing symptoms AND Abnormal imaging: Order sputum AFB x3 (8 hours apart).    Message sent to Ambulatory Infection Prevention Pool for TB flag: N/A    Scheduling protocol:  If active TB is ruled out- schedule for routine LTBI visit.    If pre-transplant workup- schedule as urgent with TID provider.    If suspected active TB- Schedule soonest available appointment.

## 2024-07-25 ENCOUNTER — ANCILLARY PROCEDURE (OUTPATIENT)
Dept: GENERAL RADIOLOGY | Facility: CLINIC | Age: 54
End: 2024-07-25
Attending: PHYSICIAN ASSISTANT
Payer: COMMERCIAL

## 2024-07-25 ENCOUNTER — TELEPHONE (OUTPATIENT)
Dept: FAMILY MEDICINE | Facility: CLINIC | Age: 54
End: 2024-07-25
Payer: COMMERCIAL

## 2024-07-25 DIAGNOSIS — R76.12 POSITIVE QUANTIFERON-TB GOLD TEST: ICD-10-CM

## 2024-07-25 PROCEDURE — 71046 X-RAY EXAM CHEST 2 VIEWS: CPT | Mod: TC | Performed by: STUDENT IN AN ORGANIZED HEALTH CARE EDUCATION/TRAINING PROGRAM

## 2024-07-25 NOTE — TELEPHONE ENCOUNTER
Please call patient with unread message below. Thanks.    Hello -     Here are my comments about the recent results. Your TB test was positive. I'd like you to get a chest x-ray and repeat this test in the near future to confirm that it is not a false positive. call [address] [place] [address] or your local [facility] to schedule a lab only visit for a repeat and to schedule your chest xray. Both are ordered. If the TB test is still positive, I'd like you to discuss treatment with infectious disease. They will call you to coordinate your care. If you don't hear from a representative within 2 business days, please call [phone].     Please let us know if you have any questions or concerns.     Regards,  ENRIQUETA Ceja

## 2024-07-25 NOTE — TELEPHONE ENCOUNTER
Called patient and relayed the below message. She verbalized understanding and the number for scheduling provided to patient in case she gets disconnected. Patient transferred to scheduling for her CXR.    Leatha Yadav RN

## 2024-07-26 ENCOUNTER — VIRTUAL VISIT (OUTPATIENT)
Dept: FAMILY MEDICINE | Facility: CLINIC | Age: 54
End: 2024-07-26
Payer: COMMERCIAL

## 2024-07-26 ENCOUNTER — HOSPITAL ENCOUNTER (OUTPATIENT)
Facility: AMBULATORY SURGERY CENTER | Age: 54
End: 2024-07-26
Attending: SURGERY
Payer: COMMERCIAL

## 2024-07-26 ENCOUNTER — TELEPHONE (OUTPATIENT)
Dept: GASTROENTEROLOGY | Facility: CLINIC | Age: 54
End: 2024-07-26

## 2024-07-26 ENCOUNTER — TELEPHONE (OUTPATIENT)
Dept: FAMILY MEDICINE | Facility: CLINIC | Age: 54
End: 2024-07-26

## 2024-07-26 DIAGNOSIS — R07.9 CHEST PAIN, UNSPECIFIED TYPE: ICD-10-CM

## 2024-07-26 PROCEDURE — 99442 PR PHYSICIAN TELEPHONE EVALUATION 11-20 MIN: CPT | Mod: 93 | Performed by: PHYSICIAN ASSISTANT

## 2024-07-26 RX ORDER — OMEPRAZOLE 40 MG/1
40 CAPSULE, DELAYED RELEASE ORAL DAILY
Qty: 60 CAPSULE | Refills: 0 | Status: SHIPPED | OUTPATIENT
Start: 2024-07-26

## 2024-07-26 RX ORDER — SUCRALFATE 1 G/1
1 TABLET ORAL 4 TIMES DAILY PRN
Qty: 240 TABLET | Refills: 0 | Status: SHIPPED | OUTPATIENT
Start: 2024-07-26

## 2024-07-26 NOTE — PROGRESS NOTES
Arcelia is a 54 year old who is being evaluated via a billable telephone visit.    What phone number would you like to be contacted at? [phone]   How would you like to obtain your AVS? Janneth  Originating Location (pt. Location): Home  Distant Location (provider location):  On-site    Assessment & Plan   Problem List Items Addressed This Visit    None  Visit Diagnoses       Chest pain, unspecified type        Relevant Medications    sucralfate (CARAFATE) 1 GM tablet    omeprazole (PRILOSEC) 40 MG DR capsule    Other Relevant Orders    Adult GI  Referral - Consult Only           Unclear Cause of chest discomfort, though it is improved with Carafate, so this is likely GERD versus other GI etiology.  Previous H. pylori testing was negative.  She has been taking omeprazole 20 mg daily without much relief.  We will increase this to 40 mg daily.  She is status post cholecystectomy, so I have low suspicion for hepatobiliary process.  She is speaking in complete sentences I have low suspicion for cardiopulmonary process.  I reiterated the importance of follow-up with GI for EGD and clinic evaluation and she will call both [facility] and [facility] to see who can get her in sooner.  She can go back to work as although she has latent TB, her chest x-ray showed no signs of active disease.  She has infectious disease follow-up in roughly 2 weeks and her primary provider in 3 weeks, and she can certainly follow-up with me sooner as needed.    DDx and Dx discussed with and explained to the pt to their satisfaction.  All questions were answered at this time. Pt expressed understanding of and agreement with this dx, tx, and plan. Standard medication warnings given. I have given the patient a list of pertinent indications for re-evaluation. Will go to the Emergency Department if symptoms worsen or new concerning symptoms arise. Patient left the call in no apparent distress.     BMI  Estimated body mass  "index is 28.8 kg/m  as calculated from the following:    Height as of 7/19/24: 1.639 m (5' 4.53\").    Weight as of 7/19/24: 77.4 kg (170 lb 9.6 oz).       See Patient Instructions    Prescription drug management  19 minutes spent by me on the date of the encounter doing chart review, history and exam, documentation and further activities per the note  Scott Paniagua is a 54 year old, presenting for the following health issues:  Follow Up        7/26/2024    11:26 AM   Additional Questions   Roomed by Derek Hidalgo CMA   Accompanied by N/A         7/26/2024    11:26 AM   Patient Reported Additional Medications   Patient reports taking the following new medications No new medications     History of Present Illness       Headaches:   Since the patient's last clinic visit, headaches are: worsened  The patient is getting headaches:  2 to 3 times a week  She is able to do normal daily activities when she has a migraine.  The patient is taking the following rescue/relief medications:  Tylenol   Patient states \"I get some relief\" from the rescue/relief medications.   The patient is taking the following medications to prevent migraines:  No medications to prevent migraines  In the past 4 weeks, the patient has gone to an Urgent Care or Emergency Room 1 time times due to headaches.    Reason for visit:  Chest pain GI and headache    She eats 0-1 servings of fruits and vegetables daily.She consumes 2 sweetened beverage(s) daily.She exercises with enough effort to increase her heart rate 9 or less minutes per day.  She exercises with enough effort to increase her heart rate 3 or less days per week.   She is taking medications regularly.     Patient is calling in today for a follow up regarding a visit on 07/19/2024.  She reports feeling the same chest discomfort with no new symptoms. Has been taking omeprazole and carafate regularly. The latter helps with the symptoms. She has not yet scheduled EGD or GI follow up. No new " symptoms. Following up with ID in ~2 weeks to discuss latent TB. CXR normal.      Review of Systems  Constitutional, HEENT, cardiovascular, pulmonary, gi and gu systems are negative, except as otherwise noted.      Objective           Vitals:  No vitals were obtained today due to virtual visit.    Physical Exam   General: Alert and no distress //Respiratory: No audible wheeze, cough, or shortness of breath // Psychiatric:  Appropriate affect, tone, and pace of words      Phone call duration: 7 minutes  Signed Electronically by: ENRIQUETA Ceja

## 2024-07-26 NOTE — TELEPHONE ENCOUNTER
Order/Referral Request    Who is requesting: Patient    Orders being requested: Endoscopy    Reason service is needed/diagnosis: pt has stomach pains and chest pain    When are orders needed by: asap    Has this been discussed with Provider: Yes    Does patient have a preference on a Group/Provider/Facility? Yes [facility]    Does patient have an appointment scheduled?: No    Where to send orders:  [facility] location     Could we send this information to you in [facility] or would you prefer to receive a phone call?:   Patient would prefer a phone call   Okay to leave a detailed message?: Yes at Home number on file [phone] (home)

## 2024-07-26 NOTE — TELEPHONE ENCOUNTER
"Per triage notes patient needs MAC, patient declined scheduling stating she will check with [facility] first.    Endoscopy Scheduling Screen    Have you had a positive Covid test in the last 14 days?  No    What is your communication preference for Instructions and/or Bowel Prep?   MyChart    What insurance is in the chart?  Other:  [facility]    Ordering/Referring Provider: Lucien Tang PA in [facility]     (If ordering provider performs procedure, schedule with ordering provider unless otherwise instructed. )    BMI: Estimated body mass index is 28.8 kg/m  as calculated from the following:    Height as of 7/19/24: 1.639 m (5' 4.53\").    Weight as of 7/19/24: 77.4 kg (170 lb 9.6 oz).     Sedation Ordered  moderate sedation.   If patient BMI > 50 do not schedule in [facility].    If patient BMI > 45 do not schedule at [facility].    Are you taking methadone or Suboxone?  No    Have you had difficulties, pain, or discomfort during past endoscopy procedures?  No    Are you taking any prescription medications for pain 3 or more times per week?   NO, No RN review required.    Do you have a history of malignant hyperthermia?  No    (Females) Are you currently pregnant?   No     Have you been diagnosed or told you have pulmonary hypertension?   No    Do you have an LVAD?  No    Have you been told you have moderate to severe sleep apnea?  No    Have you been told you have COPD, asthma, or any other lung disease?  No    Do you have any heart conditions?  No     Have you ever had or are you waiting for an organ transplant?  No. Continue scheduling, no site restrictions.    Have you had a stroke or transient ischemic attack (TIA aka \"mini stroke\" in the last 6 months?   No    Have you been diagnosed with or been told you have cirrhosis of the liver?   No    Are you currently on dialysis?   No    Do you need assistance transferring?   No    BMI: Estimated body mass index is 28.8 kg/m  as calculated from the following:    Height as of " "7/19/24: 1.639 m (5' 4.53\").    Weight as of 7/19/24: 77.4 kg (170 lb 9.6 oz).     Is patients BMI > 40 and scheduling location [facility]?  No    Do you take an injectable medication for weight loss or diabetes (excluding insulin)?  No    Do you take the medication Naltrexone?  No    Do you take blood thinners?  No       Prep   Are you currently on dialysis or do you have chronic kidney disease?  No    Do you have a diagnosis of diabetes?  No    Do you have a diagnosis of cystic fibrosis (CF)?  No    On a regular basis do you go 3 -5 days between bowel movements?  Yes (Extended Prep)    BMI > 40?  Yes (Extended Prep)    Preferred Pharmacy:      [facility] [id number] [facility] [employer] - [place] - [address]  [address]  [place] [address]  Phone: [phone] Fax: [phone]      Final Scheduling Details     Procedure scheduled  Upper endoscopy (EGD)    Surgeon:  TBD     Date of procedure:  TBD     Pre-OP / PAC:   TBD    Location   TBD    Sedation   MAC/Deep Sedation - Per RN assessment.      Patient Reminders:   You will receive a call from a Nurse to review instructions and health history.  This assessment must be completed prior to your procedure.  Failure to complete the Nurse assessment may result in the procedure being cancelled.      On the day of your procedure, please designate an adult(s) who can drive you home stay with you for the next 24 hours. The medicines used in the exam will make you sleepy. You will not be able to drive.      You cannot take public transportation, ride share services, or non-medical taxi service without a responsible caregiver.  Medical transport services are allowed with the requirement that a responsible caregiver will receive you at your destination.  We require that drivers and caregivers are confirmed prior to your procedure.    "

## 2024-07-26 NOTE — PATIENT INSTRUCTIONS
Rachel Paniagua,     Thank you for allowing [facility] to manage your care.     I am unsure of the cause of your symptoms, but your exam is reassuring. This could be GERD/acid reflux. We will see what our workup shows.      If you develop worsening/changing symptoms at any time, please be seen in clinic/urgent care or call 911/go to the emergency department for evaluation.      I sent your prescriptions to your pharmacy.     I made a referral to get an EGD test and see gastroenterology in clinic. You can do this with [employer] or [facility]. Please call the specialty number on your after visit summary to schedule.     Please allow 1-2 business days for our office to contact you in regards to your laboratory/radiological studies.  If not done so, I encourage you to login into [email or web address] ([email or web address] to review your results as well.      If you have any questions or concerns, please feel free to call us at [phone]     Sincerely,     Jimmie Tang PA-C     Did you know?       You can schedule a video visit for follow-up appointments as well as future appointments for certain conditions.  Please see the below link.      [email or web address]     If you have not already done so,  I encourage you to sign up for [email or web address] ([email or web address]).  This will allow you to review your results, securely communicate with a provider, and schedule virtual visits as well.

## 2024-07-29 ENCOUNTER — TELEPHONE (OUTPATIENT)
Dept: INFECTIOUS DISEASES | Facility: CLINIC | Age: 54
End: 2024-07-29
Payer: COMMERCIAL

## 2024-07-29 NOTE — TELEPHONE ENCOUNTER
EGD referral previously completed by Kelsey. Patient can go anywhere she prefers.   Phone # [place] [phone]- Please give patient phone # to call and schedule.   TC can fax referral if needed. Thanks.

## 2024-07-29 NOTE — TELEPHONE ENCOUNTER
EP [facility] 7/29 to offer pt to do a virtual visit with Dr. Sam for today if able. Otherwise, can keep the 8/14 appt with Dr. Stephen.     ----- Message from Nasima ZAPATA sent at 7/29/2024  8:05 AM CDT -----  Hi!    Can we offer this afternoon virtually with Dr Sam? Referral in chart.    Thanks!

## 2024-07-29 NOTE — CONFIDENTIAL NOTE
DIAGNOSIS:   Positive QuantiFERON-TB Gold test    DATE RECEIVED:  08.14.2024    NOTES (Gather within 2 years) STATUS DETAILS   OFFICE NOTE from referring provider   Internal 07.23.2024  Lucien Tang PA    LABS (any labs) Internal    MEDICATION LIST Internal    IMAGING  (NEED IMAGES AND REPORTS)     Other (anything related to diagnoses Internal 07.25.2024 XR CHEST TWO VIEWS

## 2024-08-14 ENCOUNTER — PRE VISIT (OUTPATIENT)
Dept: INFECTIOUS DISEASES | Facility: CLINIC | Age: 54
End: 2024-08-14
Payer: COMMERCIAL

## 2024-08-22 ENCOUNTER — TELEPHONE (OUTPATIENT)
Dept: FAMILY MEDICINE | Facility: CLINIC | Age: 54
End: 2024-08-22
Payer: COMMERCIAL

## 2024-08-22 NOTE — TELEPHONE ENCOUNTER
Please fax GI referral from 7/19/24 to [facility]. Note: there are 2 referrals for GI on that date. Please fax the [facility] referral only. Thanks.

## 2024-08-22 NOTE — TELEPHONE ENCOUNTER
Reason for Call:  Other     Detailed comments: [facility] needs an order that states patient is to be seen for an upper endoscopy. Fax to      Phone Number Patient can be reached at:  [place]  [phone]    Best Time:     Can we leave a detailed message on this number? Not Applicable    Call taken on 8/22/2024 at 9:30 AM by Denise Pascal

## 2024-09-11 ENCOUNTER — OFFICE VISIT (OUTPATIENT)
Dept: INFECTIOUS DISEASES | Facility: CLINIC | Age: 54
End: 2024-09-11
Attending: STUDENT IN AN ORGANIZED HEALTH CARE EDUCATION/TRAINING PROGRAM
Payer: COMMERCIAL

## 2024-09-11 VITALS
DIASTOLIC BLOOD PRESSURE: 76 MMHG | SYSTOLIC BLOOD PRESSURE: 110 MMHG | WEIGHT: 182 LBS | BODY MASS INDEX: 30.73 KG/M2 | HEART RATE: 84 BPM

## 2024-09-11 DIAGNOSIS — Z86.11 HISTORY OF TREATMENT FOR TUBERCULOSIS: ICD-10-CM

## 2024-09-11 DIAGNOSIS — Z22.7 LATENT TUBERCULOSIS INFECTION: Primary | ICD-10-CM

## 2024-09-11 PROCEDURE — 99203 OFFICE O/P NEW LOW 30 MIN: CPT | Mod: GC | Performed by: STUDENT IN AN ORGANIZED HEALTH CARE EDUCATION/TRAINING PROGRAM

## 2024-09-11 PROCEDURE — G0463 HOSPITAL OUTPT CLINIC VISIT: HCPCS | Performed by: STUDENT IN AN ORGANIZED HEALTH CARE EDUCATION/TRAINING PROGRAM

## 2024-09-11 ASSESSMENT — PAIN SCALES - GENERAL: PAINLEVEL: NO PAIN (0)

## 2024-09-11 NOTE — NURSING NOTE
"Chief Complaint   Patient presents with    Consult     LTB     Vital signs:      BP: 110/76 Pulse: 84             Weight: 82.6 kg (182 lb)  Estimated body mass index is 30.73 kg/m  as calculated from the following:    Height as of 7/19/24: 1.639 m (5' 4.53\").    Weight as of this encounter: 82.6 kg (182 lb).      Sylwia Oden CMA   9/11/2024 3:32 PM    "

## 2024-09-11 NOTE — PROGRESS NOTES
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  By patient report and chart review she was treated for latent TB in 2011 and possibly in the early 2000's with rifamycin agent based on her story with orange-colored urine.  She has always had a positive Mantoux test since she emigrated in 1990.  Most recently she had a screening QuantiFERON gold obtained which was positive.  Unfortunately, her Mantoux and QuantiFERON gold will always be positive given her previous history of latent TB.  Given she has no concerning symptoms today, and was previously treated with an identifiable agent in 2011, we will not pursue treatment today.  She should be screened for tuberculosis with a chest x-ray moving forward.    RECOMMENDATION:   No indication for retreatment with no new symptoms  Patient will always have positive Mantoux and QuantiFERON gold, please obtain chest x-ray if concern for active TB disease  With travel to [facility] in 2021 it is certain they possible that she could be reexposed however we would not know unless she develops active disease which should be screened with chest x-ray moving forward      Vishal Frias MD    ________________________________________________________________    Consult Question: LTBI           History of Present Illness:     Arcelia Liz is a 54-year-old female presenting to ID clinic for evaluation of LTBI.    She tells me she was born in [place] and immigrated to the [place] in [place].  Since then she has had a positive Mantoux test.  She recalls taking 9 months of medications with 1 possibly changing the color of her urine in 2005 which indicates prior LTBI treatment.  Since  this could not be identified in the chart, there is subsequent him formation that she was treated between 2010 and 2011 with isoniazid alone.  This is in [place] records.    Always had a reaction to Mantoux test as well as recent positive quant gold.  No recollection of Td vaccine when she was younger.  No known contacts with TB    She denies night sweats, weight loss, fevers, chills, rigors, new cough, productive sputum or other signs of TB disease.  She does endorse hemoptysis in her past but none recently.     She describes travel to [facility] in 2021 to visit family and was in both verbal and urban settings.      Systemic TB seems unlikely:  Endoscopy in 2017 - normal  Colonoscopy in 2012 - normal at that time    Anxiety with hydroxyzine, quetiapine (CYP3A metabolite), famotidine           Review of Systems:   CONSTITUTIONAL:  No fevers or chills  EYES: negative for icterus  ENT:  negative for hearing loss, tinnitus and sore throat  RESPIRATORY:  negative for cough with sputum and dyspnea  CARDIOVASCULAR: Positive for intermittent chest humza, dyspnea  GASTROINTESTINAL:  negative for nausea, vomiting, diarrhea and constipation           Past Medical History:     Past Medical History:   Diagnosis Date    Anemia 08/11/2015    Eczema     Fertility problem     JORDYN (generalized anxiety disorder)     H. pylori infection     History of vitamin D deficiency     Inflammatory acne 08/11/2015    LTBI (latent tuberculosis infection) 1990s    tx for 3 mos. 1990s, completed 9 mo tx inh 11/2010    Obesity             Past Surgical History:     Past Surgical History:   Procedure Laterality Date    COLONOSCOPY  9/6/2012    Procedure: COLONOSCOPY;  COLONOSCOPY, CHRONIC LOWER ABD PAIN;  Surgeon: Jordan Mitchell MD;  Location:  OR    DILATION AND CURETTAGE  2005    MAB, twin gestation    LAPAROSCOPIC CHOLECYSTECTOMY N/A 9/8/2020    Procedure: LAPAROSCOPIC CHOLECYSTECTOMY;  Surgeon: Javier Figueroa DO;  Location:  OR             Family History:     Family History   Problem Relation Age of Onset    No Known Problems Mother     Asthma Father     Diabetes Father     No Known Problems Sister     No Known Problems Sister     No Known Problems Sister     No Known Problems Sister     No Known Problems Sister     No Known Problems Sister     No Known Problems Sister     Coronary Artery Disease Brother         56    No Known Problems Brother     No Known Problems Brother     No Known Problems Brother     No Known Problems Brother     No Known Problems Brother     No Known Problems Brother     No Known Problems Brother     No Known Problems Brother     Glaucoma No family hx of     Macular Degeneration No family hx of     Thyroid Disease No family hx of     Cerebrovascular Disease No family hx of     Cancer No family hx of     Hypertension No family hx of     Lupus No family hx of     Thrombophilia No family hx of     Psoriasis No family hx of     Eczema No family hx of     Melanoma No family hx of     Anesthesia Reaction No family hx of     Bleeding Diathesis No family hx of             Social History:     Social History     Tobacco Use    Smoking status: Never     Passive exposure: Never    Smokeless tobacco: Never    Tobacco comments:     Lives in smoke free household   Substance Use Topics    Alcohol use: No     History   Sexual Activity    Sexual activity: Not Currently    Partners: Male    Birth control/ protection: Post-menopausal            Current Medications (antimicrobials listed in bold):     No current facility-administered medications for this visit.            Allergies:     Allergies   Allergen Reactions    Nkda [No Known Drug Allergy]             Physical Exam:   Vitals were reviewed    Ranges for his vital signs:  Pulse:  [84] 84  BP: (110)/(76) 110/76    Physical Examination:  GENERAL:  well-developed, well-nourished, in bed in no acute distress.   HEENT:  Head is normocephalic, atraumatic   EYES:  Eyes have anicteric  sclerae without conjunctival injection or stigmata of endocarditis.    LUNGS:  Clear to auscultation bilateral.   CARDIOVASCULAR:  Regular rate and rhythm with no murmurs, gallops or rubs.  ABDOMEN:  Normal bowel sounds, soft, nontender. No appreciable hepatosplenomegaly  SKIN:  No acute rashes  NEUROLOGIC:  Grossly nonfocal. Active x4 extremities         Laboratory Data:     Inflammatory Markers    Recent Labs   Lab Test 12/01/22  1329   SED 9       Hematology Studies    Recent Labs   Lab Test 02/26/24  1135 12/01/22  1329 08/29/22  1501 05/17/22  1126 01/12/22  1513 11/04/21  1107 11/04/21  1107 05/24/21  0901 06/29/20  1510 04/07/20  1434 05/10/19  1454 10/16/17  1151 04/03/17  1022   WBC 3.7* 4.7 4.4 4.3 4.6  --  4.8 3.7*   < > 5.1   < > 6.0 3.8*   ANEU  --   --   --   --   --   --   --  2.0  --  2.6  --  3.5 2.2   AEOS  --   --   --   --   --   --   --  0.1  --  0.0  --  0.1 0.0   HGB 13.6 14.4 13.6 14.3 13.9  --  13.9 14.1   < > 12.6   < > 10.4* 12.0   MCV 88 86 88 89 89  --  87 86   < > 82   < > 78 82    297 225 259 240   < > 225 242   < > 254   < > 368 297    < > = values in this interval not displayed.       Immune Globulin Studies  No lab results found.    Metabolic Studies     Recent Labs   Lab Test 02/26/24  1135 12/01/22  1329 08/29/22  1501 05/17/22  1126 01/12/22  1513    143 139 141 139   POTASSIUM 4.7 4.5 4.1 4.0 4.5   CHLORIDE 106 109 107 108 107   CO2 26 27 24 27 29   BUN 14.6 11 13 17 14   CR 0.70 0.66 0.58 0.59 0.60   GFRESTIMATED >90 >90 >90 >90 >90       Hepatic Studies    Recent Labs   Lab Test 02/26/24  1135 08/29/22  1501 05/17/22  1126 01/12/22  1513 11/04/21  1107 04/26/21  0925   BILITOTAL 0.3 0.4 0.4 0.2 0.4 0.2   ALKPHOS 108 112 112 135 121 107   ALBUMIN 4.4 4.0 4.0 3.9 3.9 3.8   AST 22 16 12 13 14 17   ALT 17 20 23 26 21 24       Thyroid Studies    Recent Labs   Lab Test 05/24/21  0901   TSH 1.23       Microbiology:  Culture Micro   Date Value Ref Range Status    11/14/2016 10,000 to 50,000 colonies/mL Escherichia coli (A)  Final   05/28/2014   Final    Canceled, Test credited Diagnosis for bacterial vaginosis is done by Gram stain   only per the recommendation of National regulatory bodies.  A routine bacterial   culture will not be done.  If clinically relevant, a Group B Streptococcus   and/or yeast culture should be requested separately     05/12/2011 Beta hemolytic Streptococcus group B isolated  Final   11/04/2010 No growth  Final   03/08/2007 <10,000 colonies/mL Mixed gram positive lily  Final     Comment:     Multiple species present, probable perineal contamination.   03/06/2006   Final    10 to 50,000 colonies/mL Beta hemolytic Streptococcus group B     Comment:     10 to 50,000 colonies/mL Staphylococcus species Susceptibility testing not   routinely done   02/23/2006   Final    Beta hemolytic Streptococcus group B Strain 1 isolated     Comment:     Beta hemolytic Streptococcus group B Strain 2 isolated       TB        Urine Studies    Recent Labs   Lab Test 02/26/24  1135 04/18/23  1038 05/17/22  1017 04/26/21  0923 01/19/21  1535 09/19/17  1420 11/14/16  1655   LEUKEST Negative Trace* Negative Negative Negative   < > Small*   WBCU  --  10-25*  --   --   --   --  *    < > = values in this interval not displayed.     Hepatitis C Testing     Hepatitis C Antibody   Date Value Ref Range Status   01/03/2023 Nonreactive Nonreactive Final     IMAGING  CXR 7/25/24  IMPRESSION: No focal consolidation, pleural effusion or pneumothorax.  Cardiomediastinal silhouette is unremarkable.    CT CAP 3/10/21  IMPRESSION:  1.  No acute abnormality demonstrated in the chest, abdomen, or  pelvis.  2.  Chronic cystic bronchiectasis in the anterior aspect of the right  lower lobe, unchanged from 2013.  3.  Mild fatty infiltration of the liver.    Vishal Frias MD    This dictation was prepared in part using Dragon recognition software.  As a result errors may occur. When  identified these transcription errors have been corrected.  While every attempt is made to correct errors during dictation, errors may still exist

## 2024-09-11 NOTE — PATIENT INSTRUCTIONS
Wrap up  - No need for Tb treatment since you were treated in 2011  -Your Quant Gold test (blood test for Tb) will ALWAYS be positive, for screening get chest xray to monitor for only active Tb

## 2024-09-11 NOTE — LETTER
9/11/2024       RE: Arcelia Liz  [address]  [place] [address]     Dear Colleague,    Thank you for referring your patient, Arcelia Liz, to the [facility] INFECTIOUS DISEASE CLINIC [place] [facility]. Please see a copy of my visit note below.      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  By patient report and chart review she was treated for latent TB in 2011 and possibly in the early 2000's with rifamycin agent based on her story with orange-colored urine.  She has always had a positive Mantoux test since she emigrated in 1990.  Most recently she had a screening QuantiFERON gold obtained which was positive.  Unfortunately, her Mantoux and QuantiFERON gold will always be positive given her previous history of latent TB.  Given she has no concerning symptoms today, and was previously treated with an identifiable agent in 2011, we will not pursue treatment today.  She should be screened for tuberculosis with a chest x-ray moving forward.    RECOMMENDATION:   No indication for retreatment with no new symptoms  Patient will always have positive Mantoux and QuantiFERON gold, please obtain chest x-ray if concern for active TB disease  With travel to [facility] in 2021 it is certain they possible that she could be reexposed however we would not know unless she develops active disease which should be screened with chest x-ray moving forward      Vishal Frias MD    ________________________________________________________________    Consult Question: LTBI           History of Present Illness:     Arcelia Liz is a 54-year-old female presenting to  ID clinic for evaluation of LTBI.    She tells me she was born in [facility] and immigrated to the [place] in [place].  Since then she has had a positive Mantoux test.  She recalls taking 9 months of medications with 1 possibly changing the color of her urine in 2005 which indicates prior LTBI treatment.  Since this could not be identified in the chart, there is subsequent him formation that she was treated between 2010 and 2011 with isoniazid alone.  This is in [facility] records.    Always had a reaction to Mantoux test as well as recent positive quant gold.  No recollection of Td vaccine when she was younger.  No known contacts with TB    She denies night sweats, weight loss, fevers, chills, rigors, new cough, productive sputum or other signs of TB disease.  She does endorse hemoptysis in her past but none recently.     She describes travel to [facility] in 2021 to visit family and was in both verbal and urban settings.      Systemic TB seems unlikely:  Endoscopy in 2017 - normal  Colonoscopy in 2012 - normal at that time    Anxiety with hydroxyzine, quetiapine (CYP3A metabolite), famotidine           Review of Systems:   CONSTITUTIONAL:  No fevers or chills  EYES: negative for icterus  ENT:  negative for hearing loss, tinnitus and sore throat  RESPIRATORY:  negative for cough with sputum and dyspnea  CARDIOVASCULAR: Positive for intermittent chest humza, dyspnea  GASTROINTESTINAL:  negative for nausea, vomiting, diarrhea and constipation           Past Medical History:     Past Medical History:   Diagnosis Date     Anemia 08/11/2015     Eczema      Fertility problem      JORDYN (generalized anxiety disorder)      H. 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 1990s, completed 9 mo tx inh 11/2010     Obesity             Past Surgical History:     Past Surgical History:   Procedure Laterality Date     COLONOSCOPY   9/6/2012    Procedure: COLONOSCOPY;  COLONOSCOPY, CHRONIC LOWER ABD PAIN;  Surgeon: Jordan Mitchell MD;  Location: [facility] OR     DILATION AND CURETTAGE  2005    MAB, twin gestation     LAPAROSCOPIC CHOLECYSTECTOMY N/A 9/8/2020    Procedure: LAPAROSCOPIC CHOLECYSTECTOMY;  Surgeon: Javier Figueroa DO;  Location: [facility] OR            Family History:     Family History   Problem Relation Age of Onset     No Known Problems Mother      Asthma Father      Diabetes Father      No Known Problems Sister      No Known Problems Sister      No Known Problems Sister      No Known Problems Sister      No Known Problems Sister      No Known Problems Sister      No Known Problems Sister      Coronary Artery Disease Brother         56     No Known Problems Brother      No Known Problems Brother      No Known Problems Brother      No Known Problems Brother      No Known Problems Brother      No Known Problems Brother      No Known Problems Brother      No Known Problems Brother      Glaucoma No family hx of      Macular Degeneration No family hx of      Thyroid Disease No family hx of      Cerebrovascular Disease No family hx of      Cancer No family hx of      Hypertension No family hx of      Lupus No family hx of      Thrombophilia No family hx of      Psoriasis No family hx of      Eczema No family hx of      Melanoma No family hx of      Anesthesia Reaction No family hx of      Bleeding Diathesis No family hx of             Social History:     Social History     Tobacco Use     Smoking status: Never     Passive exposure: Never     Smokeless tobacco: Never     Tobacco comments:     Lives in smoke free household   Substance Use Topics     Alcohol use: No     History   Sexual Activity     Sexual activity: Not Currently     Partners: Male     Birth control/ protection: Post-menopausal            Current Medications (antimicrobials listed in bold):     No current facility-administered medications for this visit.             Allergies:     Allergies   Allergen Reactions     Nkda [No Known Drug Allergy]             Physical Exam:   Vitals were reviewed    Ranges for his vital signs:  Pulse:  [84] 84  BP: (110)/(76) 110/76    Physical Examination:  GENERAL:  well-developed, well-nourished, in bed in no acute distress.   HEENT:  Head is normocephalic, atraumatic   EYES:  Eyes have anicteric sclerae without conjunctival injection or stigmata of endocarditis.    LUNGS:  Clear to auscultation bilateral.   CARDIOVASCULAR:  Regular rate and rhythm with no murmurs, gallops or rubs.  ABDOMEN:  Normal bowel sounds, soft, nontender. No appreciable hepatosplenomegaly  SKIN:  No acute rashes  NEUROLOGIC:  Grossly nonfocal. Active x4 extremities         Laboratory Data:     Inflammatory Markers    Recent Labs   Lab Test 12/01/22  1329   SED 9       Hematology Studies    Recent Labs   Lab Test 02/26/24  1135 12/01/22  1329 08/29/22  1501 05/17/22  1126 01/12/22  1513 11/04/21  1107 11/04/21  1107 05/24/21  0901 06/29/20  1510 04/07/20  1434 05/10/19  1454 10/16/17  1151 04/03/17  1022   WBC 3.7* 4.7 4.4 4.3 4.6  --  4.8 3.7*   < > 5.1   < > 6.0 3.8*   ANEU  --   --   --   --   --   --   --  2.0  --  2.6  --  3.5 2.2   AEOS  --   --   --   --   --   --   --  0.1  --  0.0  --  0.1 0.0   HGB 13.6 14.4 13.6 14.3 13.9  --  13.9 14.1   < > 12.6   < > 10.4* 12.0   MCV 88 86 88 89 89  --  87 86   < > 82   < > 78 82    297 225 259 240   < > 225 242   < > 254   < > 368 297    < > = values in this interval not displayed.       Immune Globulin Studies  No lab results found.    Metabolic Studies     Recent Labs   Lab Test 02/26/24  1135 12/01/22  1329 08/29/22  1501 05/17/22  1126 01/12/22  1513    143 139 141 139   POTASSIUM 4.7 4.5 4.1 4.0 4.5   CHLORIDE 106 109 107 108 107   CO2 26 27 24 27 29   BUN 14.6 11 13 17 14   CR 0.70 0.66 0.58 0.59 0.60   GFRESTIMATED >90 >90 >90 >90 >90       Hepatic Studies    Recent Labs   Lab Test 02/26/24  1135  08/29/22  1501 05/17/22  1126 01/12/22  1513 11/04/21  1107 04/26/21  0925   BILITOTAL 0.3 0.4 0.4 0.2 0.4 0.2   ALKPHOS 108 112 112 135 121 107   ALBUMIN 4.4 4.0 4.0 3.9 3.9 3.8   AST 22 16 12 13 14 17   ALT 17 20 23 26 21 24       Thyroid Studies    Recent Labs   Lab Test 05/24/21  0901   TSH 1.23       Microbiology:  Culture Micro   Date Value Ref Range Status   11/14/2016 10,000 to 50,000 colonies/mL Escherichia coli (A)  Final   05/28/2014   Final    Canceled, Test credited Diagnosis for bacterial vaginosis is done by Gram stain   only per the recommendation of National regulatory bodies.  A routine bacterial   culture will not be done.  If clinically relevant, a Group B Streptococcus   and/or yeast culture should be requested separately     05/12/2011 Beta hemolytic Streptococcus group B isolated  Final   11/04/2010 No growth  Final   03/08/2007 <10,000 colonies/mL Mixed gram positive lily  Final     Comment:     Multiple species present, probable perineal contamination.   03/06/2006   Final    10 to 50,000 colonies/mL Beta hemolytic Streptococcus group B     Comment:     10 to 50,000 colonies/mL Staphylococcus species Susceptibility testing not   routinely done   02/23/2006   Final    Beta hemolytic Streptococcus group B Strain 1 isolated     Comment:     Beta hemolytic Streptococcus group B Strain 2 isolated       TB        Urine Studies    Recent Labs   Lab Test 02/26/24  1135 04/18/23  1038 05/17/22  1017 04/26/21  0923 01/19/21  1535 09/19/17  1420 11/14/16  1655   LEUKEST Negative Trace* Negative Negative Negative   < > Small*   WBCU  --  10-25*  --   --   --   --  *    < > = values in this interval not displayed.     Hepatitis C Testing     Hepatitis C Antibody   Date Value Ref Range Status   01/03/2023 Nonreactive Nonreactive Final     IMAGING  CXR 7/25/24  IMPRESSION: No focal consolidation, pleural effusion or pneumothorax.  Cardiomediastinal silhouette is unremarkable.    CT CAP  3/10/21  IMPRESSION:  1.  No acute abnormality demonstrated in the chest, abdomen, or  pelvis.  2.  Chronic cystic bronchiectasis in the anterior aspect of the right  lower lobe, unchanged from 2013.  3.  Mild fatty infiltration of the liver.    Vishal Frias MD    This dictation was prepared in part using Dragon recognition software.  As a result errors may occur. When identified these transcription errors have been corrected.  While every attempt is made to correct errors during dictation, errors may still exist      Infectious Disease Clinic Staff Note: Ms. Liz was seen, examined, and the case was discussed with Dr. Frias, ID Fellow -- I agree with his consultative history and examination, assessment and plan in this outpatient ID Consult note. This note reflects my observations and opinions and the plan outlined fully reflects my approach. I have reviewed the available history, radiology, laboratory results, and reports with the Fellow.      Again, thank you for allowing me to participate in the care of your patient.      Sincerely,    Vishal Frias MD

## 2024-09-13 ENCOUNTER — TRANSFERRED RECORDS (OUTPATIENT)
Dept: HEALTH INFORMATION MANAGEMENT | Facility: CLINIC | Age: 54
End: 2024-09-13
Payer: COMMERCIAL

## 2024-09-16 NOTE — PROGRESS NOTES
Infectious Disease Clinic Staff Note: Ms. Liz was seen, examined, and the case was discussed with Dr. Frias, ID Fellow -- I agree with his consultative history and examination, assessment and plan in this outpatient ID Consult note. This note reflects my observations and opinions and the plan outlined fully reflects my approach. I have reviewed the available history, radiology, laboratory results, and reports with the Fellow.

## 2024-10-17 ENCOUNTER — PATIENT OUTREACH (OUTPATIENT)
Dept: CARE COORDINATION | Facility: CLINIC | Age: 54
End: 2024-10-17

## 2024-11-13 ENCOUNTER — TRANSFERRED RECORDS (OUTPATIENT)
Dept: HEALTH INFORMATION MANAGEMENT | Facility: CLINIC | Age: 54
End: 2024-11-13

## 2025-01-04 ENCOUNTER — HEALTH MAINTENANCE LETTER (OUTPATIENT)
Age: 55
End: 2025-01-04

## 2025-02-06 ENCOUNTER — OFFICE VISIT (OUTPATIENT)
Dept: FAMILY MEDICINE | Facility: CLINIC | Age: 55
End: 2025-02-06
Payer: COMMERCIAL

## 2025-02-06 VITALS
HEART RATE: 120 BPM | WEIGHT: 173 LBS | DIASTOLIC BLOOD PRESSURE: 89 MMHG | BODY MASS INDEX: 29.53 KG/M2 | OXYGEN SATURATION: 99 % | SYSTOLIC BLOOD PRESSURE: 138 MMHG | HEIGHT: 64 IN | RESPIRATION RATE: 20 BRPM | TEMPERATURE: 97.2 F

## 2025-02-06 DIAGNOSIS — K59.00 CONSTIPATION, UNSPECIFIED CONSTIPATION TYPE: ICD-10-CM

## 2025-02-06 DIAGNOSIS — F41.1 GAD (GENERALIZED ANXIETY DISORDER): Primary | ICD-10-CM

## 2025-02-06 DIAGNOSIS — F43.21 GRIEF: ICD-10-CM

## 2025-02-06 DIAGNOSIS — F43.10 PTSD (POST-TRAUMATIC STRESS DISORDER): ICD-10-CM

## 2025-02-06 PROCEDURE — G2211 COMPLEX E/M VISIT ADD ON: HCPCS | Performed by: PHYSICIAN ASSISTANT

## 2025-02-06 PROCEDURE — 99214 OFFICE O/P EST MOD 30 MIN: CPT | Performed by: PHYSICIAN ASSISTANT

## 2025-02-06 RX ORDER — L.ACIDOPH/B.ANIMALIS/B.LONGUM 15B CELL
1 CAPSULE ORAL DAILY
Qty: 90 CAPSULE | Refills: 3 | Status: SHIPPED | OUTPATIENT
Start: 2025-02-06 | End: 2025-02-06

## 2025-02-06 RX ORDER — DOCUSATE SODIUM 100 MG/1
100 CAPSULE, LIQUID FILLED ORAL AT BEDTIME
Qty: 90 CAPSULE | Refills: 3 | Status: SHIPPED | OUTPATIENT
Start: 2025-02-06

## 2025-02-06 RX ORDER — L.ACIDOPH/B.ANIMALIS/B.LONGUM 15B CELL
1 CAPSULE ORAL DAILY
Qty: 90 CAPSULE | Refills: 3 | Status: SHIPPED | OUTPATIENT
Start: 2025-02-06

## 2025-02-06 RX ORDER — BUSPIRONE HYDROCHLORIDE 5 MG/1
5 TABLET ORAL 2 TIMES DAILY
Qty: 180 TABLET | Refills: 1 | Status: SHIPPED | OUTPATIENT
Start: 2025-02-06

## 2025-02-06 RX ORDER — DOCUSATE SODIUM 100 MG/1
100 CAPSULE, LIQUID FILLED ORAL AT BEDTIME
Qty: 90 CAPSULE | Refills: 3 | Status: SHIPPED | OUTPATIENT
Start: 2025-02-06 | End: 2025-02-06

## 2025-02-06 RX ORDER — BUSPIRONE HYDROCHLORIDE 5 MG/1
5 TABLET ORAL 3 TIMES DAILY
Qty: 180 TABLET | Refills: 1 | Status: SHIPPED | OUTPATIENT
Start: 2025-02-06 | End: 2025-02-06

## 2025-02-06 ASSESSMENT — ENCOUNTER SYMPTOMS: ABDOMINAL PAIN: 1

## 2025-02-06 ASSESSMENT — ANXIETY QUESTIONNAIRES
IF YOU CHECKED OFF ANY PROBLEMS ON THIS QUESTIONNAIRE, HOW DIFFICULT HAVE THESE PROBLEMS MADE IT FOR YOU TO DO YOUR WORK, TAKE CARE OF THINGS AT HOME, OR GET ALONG WITH OTHER PEOPLE: VERY DIFFICULT
GAD7 TOTAL SCORE: 16
5. BEING SO RESTLESS THAT IT IS HARD TO SIT STILL: NEARLY EVERY DAY
8. IF YOU CHECKED OFF ANY PROBLEMS, HOW DIFFICULT HAVE THESE MADE IT FOR YOU TO DO YOUR WORK, TAKE CARE OF THINGS AT HOME, OR GET ALONG WITH OTHER PEOPLE?: VERY DIFFICULT
4. TROUBLE RELAXING: NEARLY EVERY DAY
3. WORRYING TOO MUCH ABOUT DIFFERENT THINGS: NEARLY EVERY DAY
2. NOT BEING ABLE TO STOP OR CONTROL WORRYING: MORE THAN HALF THE DAYS
GAD7 TOTAL SCORE: 16
1. FEELING NERVOUS, ANXIOUS, OR ON EDGE: MORE THAN HALF THE DAYS
7. FEELING AFRAID AS IF SOMETHING AWFUL MIGHT HAPPEN: NEARLY EVERY DAY
7. FEELING AFRAID AS IF SOMETHING AWFUL MIGHT HAPPEN: NEARLY EVERY DAY
6. BECOMING EASILY ANNOYED OR IRRITABLE: NOT AT ALL
GAD7 TOTAL SCORE: 16

## 2025-02-06 ASSESSMENT — PATIENT HEALTH QUESTIONNAIRE - PHQ9
SUM OF ALL RESPONSES TO PHQ QUESTIONS 1-9: 9
10. IF YOU CHECKED OFF ANY PROBLEMS, HOW DIFFICULT HAVE THESE PROBLEMS MADE IT FOR YOU TO DO YOUR WORK, TAKE CARE OF THINGS AT HOME, OR GET ALONG WITH OTHER PEOPLE: SOMEWHAT DIFFICULT
SUM OF ALL RESPONSES TO PHQ QUESTIONS 1-9: 9

## 2025-02-06 ASSESSMENT — PAIN SCALES - GENERAL: PAINLEVEL_OUTOF10: SEVERE PAIN (7)

## 2025-02-06 NOTE — PATIENT INSTRUCTIONS
Probiotic (Florajen)  Docusate every evening  Increase water and vegetables/fruit intake  Get colonoscopy and EGD as planned  Follow up with GI    I am happy to see you if unable to see PCP, but it might take a day or two to get in.

## 2025-02-06 NOTE — PROGRESS NOTES
"  Assessment & Plan     Arcelia Liz is a 54F presenting today for abdominal pain for the past 5 days and anxiety. Her abdominal pain appears to be related to gas. She has had an abdominal ultrasound and pelvic ultrasound both completed in the past year which were insignificant. She reports she has an appointment for an endoscopy and colonoscopy for 2/13. We encouraged her to follow through with this appointment and to refrain from NSAID use until then. We also encouraged use of probiotics and docusate for her symptoms. Her anxiety seems to be related to her health. She will discontinue sertraline and use both buspar and hydroxyzine as needed. She will also start working with a mental health therapist.     JORDYN (generalized anxiety disorder)  - Adult Mental Health  Referral; Future  - busPIRone (BUSPAR) 5 MG tablet; Take 1 tablet (5 mg) by mouth 2 times daily.    Grief  - Adult Mental Health  Referral; Future    PTSD (post-traumatic stress disorder)  - Adult Mental Health  Referral; Future    Constipation, unspecified constipation type  - docusate sodium (COLACE) 100 MG capsule; Take 1 capsule (100 mg) by mouth at bedtime.  - Probiotic Product (FLORAJEN DIGESTION) CAPS; Take 1 capsule by mouth daily.      BMI  Estimated body mass index is 29.7 kg/m  as calculated from the following:    Height as of this encounter: 1.626 m (5' 4\").    Weight as of this encounter: 78.5 kg (173 lb).       Subjective   Arcelia is a 54 year old, presenting for the following health issues:  Abdominal Pain (Dull, all over, since sunday)        2/6/2025    10:21 AM   Additional Questions   Roomed by will   Accompanied by sister         2/6/2025    10:21 AM   Patient Reported Additional Medications   Patient reports taking the following new medications see chart     Arcelia Liz is a 54F presenting today with abdominal pain for the past 5 days and anxiety. She describes her abdominal pain as dull that starts in " the epigastric region but becomes diffuse. It is relieved by passing gas, bowel movements and burping but then comes back 30 minutes later. She has had many visits for abdominal pain in the past including imaging, referral to GI and labs. She had a cold one month ago. No recent travel. He has bowel movements 2-3x/week, her last BM was yesterday. She reports she has always struggled with constipation. Her bowel movements are hard but sometimes she will get diarrhea. She is also struggling with her anxiety as her  passed away from cancer and she is grieving. This is make her nervous about the abdominal pain she has been experiencing. She has sertraline and hydroxyzine, both of which she takes as needed. She would like a referral to therapy.     History of Present Illness       Back Pain:  She presents for follow up of back pain. Patient's back pain is a recurring problem.  Location of back pain:  Right lower back and left lower back  Description of back pain: dull ache  Back pain spreads: right knee and left knee    Since patient first noticed back pain, pain is: always present, but gets better and worse  Does back pain interfere with her job:  No       Mental Health Follow-up:  Patient presents to follow-up on Depression & Anxiety.Patient's depression since last visit has been:  Medium  The patient is having other symptoms associated with depression.  Patient's anxiety since last visit has been:  Medium  The patient is having other symptoms associated with anxiety.  Any significant life events: grief or loss  Patient is feeling anxious or having panic attacks.  Patient has no concerns about alcohol or drug use.    Headaches:   Since the patient's last clinic visit, headaches are: no change  The patient is getting headaches:  Twice a week  She is able to do normal daily activities when she has a migraine.  The patient is taking the following rescue/relief medications:  Ibuprofen (Advil, Motrin)   Patient states  "\"I get some relief\" from the rescue/relief medications.   The patient is taking the following medications to prevent migraines:  No medications to prevent migraines  In the past 4 weeks, the patient has gone to an Urgent Care or Emergency Room 0 times times due to headaches.    She eats 0-1 servings of fruits and vegetables daily.She consumes 0 sweetened beverage(s) daily.She exercises with enough effort to increase her heart rate 9 or less minutes per day.  She exercises with enough effort to increase her heart rate 3 or less days per week.   She is taking medications regularly.       Review of Systems  See HPI.       Objective    /89   Pulse 120   Temp 97.2  F (36.2  C) (Tympanic)   Resp 20   Ht 1.626 m (5' 4\")   Wt 78.5 kg (173 lb)   LMP  (LMP Unknown)   SpO2 99%   BMI 29.70 kg/m    Body mass index is 29.7 kg/m .  Physical Exam  Constitutional:       General: She is not in acute distress.     Appearance: She is not diaphoretic.   Eyes:      General: No scleral icterus.        Right eye: No discharge.         Left eye: No discharge.      Extraocular Movements: Extraocular movements intact.      Conjunctiva/sclera: Conjunctivae normal.   Pulmonary:      Effort: Pulmonary effort is normal.   Musculoskeletal:         General: Normal range of motion.   Skin:     General: Skin is warm and dry.   Neurological:      General: No focal deficit present.      Mental Status: She is alert and oriented to person, place, and time.   Psychiatric:         Mood and Affect: Mood is anxious.         Behavior: Behavior normal.           Documentation written by Alexis Myhre, PA-S2, under my supervision. I certify that her documentation is correct and appropriately represents the visit events.     Signed Electronically by: KINGSTON ALCANTARA PA-C  {Email feedback regarding this note to [email or web address]   :[id number]}  " certify that her documentation is correct and appropriately represents the visit events.     Signed Electronically by: KINGSTON ALCANTARA PA-C

## 2025-02-13 ENCOUNTER — TRANSFERRED RECORDS (OUTPATIENT)
Dept: HEALTH INFORMATION MANAGEMENT | Facility: CLINIC | Age: 55
End: 2025-02-13
Payer: COMMERCIAL

## (undated) DEVICE — DEVICE SUTURE GRASPER TROCAR CLOSURE 14GA PMITCSG

## (undated) DEVICE — DRAPE LAPAROSCOPIC ABDOMINAL ASTOUND 106X124" LF 9438

## (undated) DEVICE — CLIP APPLIER ENDO 5MM M/L LIGAMAX EL5ML

## (undated) DEVICE — DRSG STERI STRIP 1/4X3" R1541

## (undated) DEVICE — PACK MINOR SBA15MIFSE

## (undated) DEVICE — TUBING INSUFFLATION W/FILTER CPC TO LUER 620-030-301

## (undated) DEVICE — ENDO TROCAR FIRST ENTRY KII FIOS Z-THRD 05X100MM CTF03

## (undated) DEVICE — ENDO TROCAR SLEEVE KII Z-THREADED 05X100MM CTS02

## (undated) DEVICE — ENDO SCOPE WARMER TM500

## (undated) DEVICE — GLOVE PROTEXIS BLUE W/NEU-THERA 8.0  2D73EB80

## (undated) DEVICE — BLADE KNIFE SURG 11 371111

## (undated) DEVICE — BLADE SWITCH SCISSORS TIP 5MM 89-5100B

## (undated) DEVICE — SU VICRYL 0 UR-6 27" J603H

## (undated) DEVICE — SUCTION IRR STRYKERFLOW II W/TIP 250-070-520

## (undated) DEVICE — ADH SKIN CLOSURE PREMIERPRO EXOFIN 1.0ML 3470

## (undated) DEVICE — PREP CHLORAPREP 26ML TINTED ORANGE  260815

## (undated) DEVICE — ANTIFOG SOLUTION W/FOAM PAD CF-1001

## (undated) DEVICE — GLOVE PROTEXIS W/NEU-THERA 8.0  2D73TE80

## (undated) DEVICE — NDL 25GA 1.5" 305127

## (undated) DEVICE — SU VICRYL 3-0 SH 27" UND J416H

## (undated) DEVICE — SOL WATER IRRIG 1000ML BOTTLE 07139-09

## (undated) DEVICE — SUCTION CANISTER MEDIVAC LINER 1500ML W/LID 65651-515

## (undated) DEVICE — ESU GROUND PAD ADULT W/CORD E7507

## (undated) DEVICE — SOL NACL 0.9% INJ 1000ML BAG 07983-09

## (undated) RX ORDER — ONDANSETRON 2 MG/ML
INJECTION INTRAMUSCULAR; INTRAVENOUS
Status: DISPENSED
Start: 2020-09-08

## (undated) RX ORDER — BUPIVACAINE HYDROCHLORIDE AND EPINEPHRINE 2.5; 5 MG/ML; UG/ML
INJECTION, SOLUTION INFILTRATION; PERINEURAL
Status: DISPENSED
Start: 2020-09-08

## (undated) RX ORDER — OXYCODONE HYDROCHLORIDE 5 MG/1
TABLET ORAL
Status: DISPENSED
Start: 2020-09-08

## (undated) RX ORDER — FENTANYL CITRATE 50 UG/ML
INJECTION, SOLUTION INTRAMUSCULAR; INTRAVENOUS
Status: DISPENSED
Start: 2020-09-08

## (undated) RX ORDER — GABAPENTIN 300 MG/1
CAPSULE ORAL
Status: DISPENSED
Start: 2020-09-08

## (undated) RX ORDER — ACETAMINOPHEN 325 MG/1
TABLET ORAL
Status: DISPENSED
Start: 2020-09-08

## (undated) RX ORDER — DEXAMETHASONE SODIUM PHOSPHATE 4 MG/ML
INJECTION, SOLUTION INTRA-ARTICULAR; INTRALESIONAL; INTRAMUSCULAR; INTRAVENOUS; SOFT TISSUE
Status: DISPENSED
Start: 2020-09-08